# Patient Record
Sex: FEMALE | Race: WHITE | NOT HISPANIC OR LATINO
[De-identification: names, ages, dates, MRNs, and addresses within clinical notes are randomized per-mention and may not be internally consistent; named-entity substitution may affect disease eponyms.]

---

## 2017-04-05 ENCOUNTER — RESULT REVIEW (OUTPATIENT)
Age: 52
End: 2017-04-05

## 2017-06-30 ENCOUNTER — APPOINTMENT (OUTPATIENT)
Dept: MAMMOGRAPHY | Facility: CLINIC | Age: 52
End: 2017-06-30

## 2018-02-21 ENCOUNTER — EMERGENCY (EMERGENCY)
Facility: HOSPITAL | Age: 53
LOS: 0 days | Discharge: HOME | End: 2018-02-21
Attending: EMERGENCY MEDICINE | Admitting: EMERGENCY MEDICINE
Payer: COMMERCIAL

## 2018-02-21 VITALS
RESPIRATION RATE: 18 BRPM | TEMPERATURE: 99 F | DIASTOLIC BLOOD PRESSURE: 75 MMHG | HEART RATE: 83 BPM | SYSTOLIC BLOOD PRESSURE: 117 MMHG | OXYGEN SATURATION: 100 %

## 2018-02-21 VITALS — WEIGHT: 216.05 LBS | HEIGHT: 69 IN

## 2018-02-21 DIAGNOSIS — K57.92 DIVERTICULITIS OF INTESTINE, PART UNSPECIFIED, WITHOUT PERFORATION OR ABSCESS WITHOUT BLEEDING: ICD-10-CM

## 2018-02-21 DIAGNOSIS — R10.32 LEFT LOWER QUADRANT PAIN: ICD-10-CM

## 2018-02-21 LAB
ALBUMIN SERPL ELPH-MCNC: 3.5 G/DL — SIGNIFICANT CHANGE UP (ref 3.3–5)
ALP SERPL-CCNC: 58 U/L — SIGNIFICANT CHANGE UP (ref 40–120)
ALT FLD-CCNC: 21 U/L — SIGNIFICANT CHANGE UP (ref 12–78)
ANION GAP SERPL CALC-SCNC: 5 MMOL/L — SIGNIFICANT CHANGE UP (ref 5–17)
APPEARANCE UR: CLEAR — SIGNIFICANT CHANGE UP
APTT BLD: 30.2 SEC — SIGNIFICANT CHANGE UP (ref 27.5–37.4)
AST SERPL-CCNC: 9 U/L — LOW (ref 15–37)
BACTERIA # UR AUTO: (no result)
BASOPHILS # BLD AUTO: 0.1 K/UL — SIGNIFICANT CHANGE UP (ref 0–0.2)
BASOPHILS NFR BLD AUTO: 1.2 % — SIGNIFICANT CHANGE UP (ref 0–2)
BILIRUB SERPL-MCNC: 0.3 MG/DL — SIGNIFICANT CHANGE UP (ref 0.2–1.2)
BILIRUB UR-MCNC: NEGATIVE — SIGNIFICANT CHANGE UP
BUN SERPL-MCNC: 17 MG/DL — SIGNIFICANT CHANGE UP (ref 7–23)
CALCIUM SERPL-MCNC: 8.8 MG/DL — SIGNIFICANT CHANGE UP (ref 8.5–10.1)
CHLORIDE SERPL-SCNC: 106 MMOL/L — SIGNIFICANT CHANGE UP (ref 96–108)
CO2 SERPL-SCNC: 29 MMOL/L — SIGNIFICANT CHANGE UP (ref 22–31)
COLOR SPEC: YELLOW — SIGNIFICANT CHANGE UP
CREAT SERPL-MCNC: 1.03 MG/DL — SIGNIFICANT CHANGE UP (ref 0.5–1.3)
DIFF PNL FLD: (no result)
EOSINOPHIL # BLD AUTO: 0.3 K/UL — SIGNIFICANT CHANGE UP (ref 0–0.5)
EOSINOPHIL NFR BLD AUTO: 2.4 % — SIGNIFICANT CHANGE UP (ref 0–6)
EPI CELLS # UR: (no result)
GLUCOSE SERPL-MCNC: 88 MG/DL — SIGNIFICANT CHANGE UP (ref 70–99)
GLUCOSE UR QL: NEGATIVE MG/DL — SIGNIFICANT CHANGE UP
HCT VFR BLD CALC: 37.7 % — SIGNIFICANT CHANGE UP (ref 34.5–45)
HGB BLD-MCNC: 12.5 G/DL — SIGNIFICANT CHANGE UP (ref 11.5–15.5)
INR BLD: 1.03 RATIO — SIGNIFICANT CHANGE UP (ref 0.88–1.16)
KETONES UR-MCNC: (no result)
LEUKOCYTE ESTERASE UR-ACNC: (no result)
LYMPHOCYTES # BLD AUTO: 2.5 K/UL — SIGNIFICANT CHANGE UP (ref 1–3.3)
LYMPHOCYTES # BLD AUTO: 21.3 % — SIGNIFICANT CHANGE UP (ref 13–44)
MCHC RBC-ENTMCNC: 28.6 PG — SIGNIFICANT CHANGE UP (ref 27–34)
MCHC RBC-ENTMCNC: 33.2 GM/DL — SIGNIFICANT CHANGE UP (ref 32–36)
MCV RBC AUTO: 86.4 FL — SIGNIFICANT CHANGE UP (ref 80–100)
MONOCYTES # BLD AUTO: 1 K/UL — HIGH (ref 0–0.9)
MONOCYTES NFR BLD AUTO: 8.8 % — SIGNIFICANT CHANGE UP (ref 2–14)
NEUTROPHILS # BLD AUTO: 7.7 K/UL — HIGH (ref 1.8–7.4)
NEUTROPHILS NFR BLD AUTO: 66.3 % — SIGNIFICANT CHANGE UP (ref 43–77)
NITRITE UR-MCNC: NEGATIVE — SIGNIFICANT CHANGE UP
PH UR: 6 — SIGNIFICANT CHANGE UP (ref 5–8)
PLATELET # BLD AUTO: 428 K/UL — HIGH (ref 150–400)
POTASSIUM SERPL-MCNC: 4.2 MMOL/L — SIGNIFICANT CHANGE UP (ref 3.5–5.3)
POTASSIUM SERPL-SCNC: 4.2 MMOL/L — SIGNIFICANT CHANGE UP (ref 3.5–5.3)
PROT SERPL-MCNC: 7.6 GM/DL — SIGNIFICANT CHANGE UP (ref 6–8.3)
PROT UR-MCNC: NEGATIVE MG/DL — SIGNIFICANT CHANGE UP
PROTHROM AB SERPL-ACNC: 11.1 SEC — SIGNIFICANT CHANGE UP (ref 9.8–12.7)
RBC # BLD: 4.37 M/UL — SIGNIFICANT CHANGE UP (ref 3.8–5.2)
RBC # FLD: 12.1 % — SIGNIFICANT CHANGE UP (ref 10.3–14.5)
RBC CASTS # UR COMP ASSIST: (no result) /HPF (ref 0–4)
SODIUM SERPL-SCNC: 140 MMOL/L — SIGNIFICANT CHANGE UP (ref 135–145)
SP GR SPEC: 1.02 — SIGNIFICANT CHANGE UP (ref 1.01–1.02)
UROBILINOGEN FLD QL: 1 MG/DL
WBC # BLD: 11.6 K/UL — HIGH (ref 3.8–10.5)
WBC # FLD AUTO: 11.6 K/UL — HIGH (ref 3.8–10.5)
WBC UR QL: (no result)

## 2018-02-21 PROCEDURE — 74177 CT ABD & PELVIS W/CONTRAST: CPT | Mod: 26

## 2018-02-21 PROCEDURE — 99284 EMERGENCY DEPT VISIT MOD MDM: CPT

## 2018-02-21 RX ORDER — MOXIFLOXACIN HYDROCHLORIDE TABLETS, 400 MG 400 MG/1
1 TABLET, FILM COATED ORAL
Qty: 20 | Refills: 0
Start: 2018-02-21 | End: 2018-03-02

## 2018-02-21 RX ORDER — METRONIDAZOLE 500 MG
500 TABLET ORAL ONCE
Qty: 0 | Refills: 0 | Status: COMPLETED | OUTPATIENT
Start: 2018-02-21 | End: 2018-02-21

## 2018-02-21 RX ORDER — CIPROFLOXACIN LACTATE 400MG/40ML
500 VIAL (ML) INTRAVENOUS ONCE
Qty: 0 | Refills: 0 | Status: COMPLETED | OUTPATIENT
Start: 2018-02-21 | End: 2018-02-21

## 2018-02-21 RX ORDER — METRONIDAZOLE 500 MG
1 TABLET ORAL
Qty: 30 | Refills: 0
Start: 2018-02-21 | End: 2018-03-02

## 2018-02-21 RX ADMIN — Medication 500 MILLIGRAM(S): at 19:14

## 2018-02-21 NOTE — ED STATDOCS - OBJECTIVE STATEMENT
51 yo female PMH ovarian cysts and endometriosis presents with lower abdominal pain x 10 days, progressively worsening, worse in LLQ.  No f/c, black or bloody stools, diarrhea, n/v/d.  No vaginal bleeding or trauma. 53 yo female PMH ovarian cysts and endometriosis presents with lower abdominal pain x 10 days, progressively worsening, worse in LLQ.  No f/c, black or bloody stools, diarrhea, n/v/d.  No vaginal bleeding or trauma. No meds for symptoms. No urinary complaints. No trauma, back pain, fevers. Never had this before.

## 2018-02-21 NOTE — ED ADULT NURSE NOTE - OBJECTIVE STATEMENT
Patient comes to ED for bilateral lower abdominal pain for 10 days. pt was seen at primary care doctor today, - urine test. pt was sent for further evaluation of abdominal pain

## 2018-02-21 NOTE — ED STATDOCS - MEDICAL DECISION MAKING DETAILS
Pt with lower abdominal pain, worse in LLQ.  Plan for labs, UA, CT A/P. Pt with lower abdominal pain, worse in LLQ.  Plan for labs, UA, CT A/P. concern for new onset diverticulitis

## 2018-02-21 NOTE — ED STATDOCS - PROGRESS NOTE DETAILS
Patient seen and evaluated, ED attending note and orders reviewed, will continue with patient follow up and care -Janine Garcia PA-C Reviewed CT findings with patient.  Counseled on GI f/u, she sees Dr. Mix, and return precautions -Janine Garcia PA-C Reviewed CT findings with patient.  Counseled on GI f/u, she sees Dr. Mix, and return precautions -MAIN Swain given

## 2018-10-15 PROBLEM — N80.9 ENDOMETRIOSIS, UNSPECIFIED: Chronic | Status: ACTIVE | Noted: 2018-02-23

## 2018-10-15 PROBLEM — N83.209 UNSPECIFIED OVARIAN CYST, UNSPECIFIED SIDE: Chronic | Status: ACTIVE | Noted: 2018-02-23

## 2018-10-31 ENCOUNTER — APPOINTMENT (OUTPATIENT)
Dept: MAMMOGRAPHY | Facility: CLINIC | Age: 53
End: 2018-10-31
Payer: COMMERCIAL

## 2018-10-31 ENCOUNTER — APPOINTMENT (OUTPATIENT)
Dept: ULTRASOUND IMAGING | Facility: CLINIC | Age: 53
End: 2018-10-31
Payer: COMMERCIAL

## 2018-10-31 ENCOUNTER — OUTPATIENT (OUTPATIENT)
Dept: OUTPATIENT SERVICES | Facility: HOSPITAL | Age: 53
LOS: 1 days | End: 2018-10-31
Payer: COMMERCIAL

## 2018-10-31 DIAGNOSIS — Z00.8 ENCOUNTER FOR OTHER GENERAL EXAMINATION: ICD-10-CM

## 2018-10-31 PROCEDURE — G0279: CPT | Mod: 26

## 2018-10-31 PROCEDURE — 76641 ULTRASOUND BREAST COMPLETE: CPT | Mod: 26,LT

## 2018-10-31 PROCEDURE — 76641 ULTRASOUND BREAST COMPLETE: CPT

## 2018-10-31 PROCEDURE — 77065 DX MAMMO INCL CAD UNI: CPT | Mod: 26,LT

## 2018-10-31 PROCEDURE — G0279: CPT

## 2018-10-31 PROCEDURE — 77066 DX MAMMO INCL CAD BI: CPT

## 2018-10-31 PROCEDURE — 77065 DX MAMMO INCL CAD UNI: CPT

## 2019-06-07 ENCOUNTER — APPOINTMENT (OUTPATIENT)
Dept: OBGYN | Facility: CLINIC | Age: 54
End: 2019-06-07
Payer: COMMERCIAL

## 2019-06-07 VITALS
HEART RATE: 72 BPM | TEMPERATURE: 98 F | HEIGHT: 69 IN | SYSTOLIC BLOOD PRESSURE: 110 MMHG | BODY MASS INDEX: 31.4 KG/M2 | DIASTOLIC BLOOD PRESSURE: 78 MMHG | WEIGHT: 212 LBS | RESPIRATION RATE: 16 BRPM

## 2019-06-07 DIAGNOSIS — Z01.419 ENCOUNTER FOR GYNECOLOGICAL EXAMINATION (GENERAL) (ROUTINE) W/OUT ABNORMAL FINDINGS: ICD-10-CM

## 2019-06-07 LAB
BILIRUB UR QL STRIP: NORMAL
CLARITY UR: CLEAR
COLLECTION METHOD: NORMAL
GLUCOSE UR-MCNC: NORMAL
HCG UR QL: 0.2 EU/DL
HGB UR QL STRIP.AUTO: NORMAL
KETONES UR-MCNC: NORMAL
LEUKOCYTE ESTERASE UR QL STRIP: NORMAL
NITRITE UR QL STRIP: NORMAL
PH UR STRIP: 5
PROT UR STRIP-MCNC: NORMAL
SP GR UR STRIP: 1.02

## 2019-06-07 PROCEDURE — 99386 PREV VISIT NEW AGE 40-64: CPT

## 2019-06-07 PROCEDURE — 82270 OCCULT BLOOD FECES: CPT

## 2019-06-07 NOTE — PHYSICAL EXAM
[Alert] : alert [Awake] : awake [Mass] : no breast mass [Acute Distress] : no acute distress [Axillary LAD] : no axillary lymphadenopathy [Nipple Discharge] : no nipple discharge [Soft] : soft [Tender] : non tender [Oriented x3] : oriented to person, place, and time [Normal] : uterus [No Bleeding] : there was no active vaginal bleeding [Uterine Adnexae] : were not tender and not enlarged [Pap Obtained] : a Pap smear was performed [No Tenderness] : no rectal tenderness [Occult Blood] : occult blood test from digital rectal exam was negative [Nl Sphincter Tone] : normal sphincter tone

## 2019-06-07 NOTE — CHIEF COMPLAINT
[Initial Visit] : initial GYN visit [FreeTextEntry1] : Patient is a 53-year-old female presents for a routine annual gynecologic examination. Patient with a significant history of breast cancer for which the patient had a mastectomy 16 years ago and the patient states that she has a ruptured left breast implant. This issue was discussed with the patient in detail the patient's last mammogram was December 2018. Patient was advised to have a breast MRI and followup with a plastic surgeon to evaluate the ruptured breast implant referrals will be given. Patient also states she has a significant family history of cancer her mother had breast cancer and her maternal aunt had ovarian cancer. Patient was advised to have genetic cancer screening blood testing which she will have at this time. Patient also advised to have a bone density DEXA

## 2019-06-10 LAB — HPV HIGH+LOW RISK DNA PNL CVX: NOT DETECTED

## 2019-06-23 ENCOUNTER — FORM ENCOUNTER (OUTPATIENT)
Age: 54
End: 2019-06-23

## 2019-06-24 ENCOUNTER — APPOINTMENT (OUTPATIENT)
Dept: MRI IMAGING | Facility: CLINIC | Age: 54
End: 2019-06-24
Payer: COMMERCIAL

## 2019-06-24 ENCOUNTER — CLINICAL ADVICE (OUTPATIENT)
Age: 54
End: 2019-06-24

## 2019-06-24 ENCOUNTER — APPOINTMENT (OUTPATIENT)
Dept: RADIOLOGY | Facility: CLINIC | Age: 54
End: 2019-06-24
Payer: COMMERCIAL

## 2019-06-24 ENCOUNTER — OUTPATIENT (OUTPATIENT)
Dept: OUTPATIENT SERVICES | Facility: HOSPITAL | Age: 54
LOS: 1 days | End: 2019-06-24
Payer: COMMERCIAL

## 2019-06-24 DIAGNOSIS — Z00.8 ENCOUNTER FOR OTHER GENERAL EXAMINATION: ICD-10-CM

## 2019-06-24 PROCEDURE — 77080 DXA BONE DENSITY AXIAL: CPT | Mod: 26

## 2019-06-24 PROCEDURE — 77049 MRI BREAST C-+ W/CAD BI: CPT | Mod: 26

## 2019-06-24 PROCEDURE — C8908: CPT

## 2019-06-24 PROCEDURE — 77080 DXA BONE DENSITY AXIAL: CPT

## 2019-06-24 PROCEDURE — A9585: CPT

## 2019-06-24 PROCEDURE — C8937: CPT

## 2019-06-25 ENCOUNTER — CLINICAL ADVICE (OUTPATIENT)
Age: 54
End: 2019-06-25

## 2019-07-08 ENCOUNTER — FORM ENCOUNTER (OUTPATIENT)
Age: 54
End: 2019-07-08

## 2019-07-09 ENCOUNTER — APPOINTMENT (OUTPATIENT)
Dept: ULTRASOUND IMAGING | Facility: CLINIC | Age: 54
End: 2019-07-09
Payer: COMMERCIAL

## 2019-07-09 ENCOUNTER — RESULT REVIEW (OUTPATIENT)
Age: 54
End: 2019-07-09

## 2019-07-09 ENCOUNTER — OUTPATIENT (OUTPATIENT)
Dept: OUTPATIENT SERVICES | Facility: HOSPITAL | Age: 54
LOS: 1 days | End: 2019-07-09
Payer: COMMERCIAL

## 2019-07-09 DIAGNOSIS — Z00.8 ENCOUNTER FOR OTHER GENERAL EXAMINATION: ICD-10-CM

## 2019-07-09 DIAGNOSIS — Z85.3 PERSONAL HISTORY OF MALIGNANT NEOPLASM OF BREAST: ICD-10-CM

## 2019-07-09 PROCEDURE — 19084 BX BREAST ADD LESION US IMAG: CPT | Mod: LT

## 2019-07-09 PROCEDURE — 19083 BX BREAST 1ST LESION US IMAG: CPT | Mod: LT

## 2019-07-09 PROCEDURE — 88360 TUMOR IMMUNOHISTOCHEM/MANUAL: CPT | Mod: 26

## 2019-07-09 PROCEDURE — 77065 DX MAMMO INCL CAD UNI: CPT | Mod: 26,LT

## 2019-07-09 PROCEDURE — 19083 BX BREAST 1ST LESION US IMAG: CPT

## 2019-07-09 PROCEDURE — 88305 TISSUE EXAM BY PATHOLOGIST: CPT

## 2019-07-09 PROCEDURE — 88342 IMHCHEM/IMCYTCHM 1ST ANTB: CPT | Mod: 26,59

## 2019-07-09 PROCEDURE — 88360 TUMOR IMMUNOHISTOCHEM/MANUAL: CPT

## 2019-07-09 PROCEDURE — 88342 IMHCHEM/IMCYTCHM 1ST ANTB: CPT

## 2019-07-09 PROCEDURE — 88305 TISSUE EXAM BY PATHOLOGIST: CPT | Mod: 26

## 2019-07-09 PROCEDURE — A4648: CPT

## 2019-07-09 PROCEDURE — 19084 BX BREAST ADD LESION US IMAG: CPT

## 2019-07-09 PROCEDURE — 77065 DX MAMMO INCL CAD UNI: CPT

## 2019-07-11 ENCOUNTER — APPOINTMENT (OUTPATIENT)
Dept: GASTROENTEROLOGY | Facility: CLINIC | Age: 54
End: 2019-07-11
Payer: COMMERCIAL

## 2019-07-11 ENCOUNTER — CLINICAL ADVICE (OUTPATIENT)
Age: 54
End: 2019-07-11

## 2019-07-11 VITALS
HEIGHT: 69 IN | HEART RATE: 83 BPM | WEIGHT: 215 LBS | BODY MASS INDEX: 31.84 KG/M2 | DIASTOLIC BLOOD PRESSURE: 92 MMHG | SYSTOLIC BLOOD PRESSURE: 119 MMHG

## 2019-07-11 PROCEDURE — 99202 OFFICE O/P NEW SF 15 MIN: CPT

## 2019-07-11 NOTE — PHYSICAL EXAM
[General Appearance - Alert] : alert [General Appearance - In No Acute Distress] : in no acute distress [Auscultation Breath Sounds / Voice Sounds] : lungs were clear to auscultation bilaterally [Heart Rate And Rhythm] : heart rate was normal and rhythm regular [Heart Sounds Gallop] : no gallops [Heart Sounds] : normal S1 and S2 [Murmurs] : no murmurs [Heart Sounds Pericardial Friction Rub] : no pericardial rub [Bowel Sounds] : normal bowel sounds [Abdomen Soft] : soft [Abdomen Tenderness] : non-tender [] : no hepato-splenomegaly [Abdomen Mass (___ Cm)] : no abdominal mass palpated

## 2019-07-11 NOTE — HISTORY OF PRESENT ILLNESS
[de-identified] : 52 yo female with h problems of heartburn and GERD. No dysphagia, weight loss, nocturnal symptoms. Patient does note some globus symptoms associated and chronic cough.

## 2019-07-24 ENCOUNTER — APPOINTMENT (OUTPATIENT)
Dept: BREAST CENTER | Facility: CLINIC | Age: 54
End: 2019-07-24
Payer: COMMERCIAL

## 2019-07-24 VITALS
WEIGHT: 217 LBS | SYSTOLIC BLOOD PRESSURE: 129 MMHG | DIASTOLIC BLOOD PRESSURE: 72 MMHG | HEIGHT: 69 IN | HEART RATE: 100 BPM | BODY MASS INDEX: 32.14 KG/M2

## 2019-07-24 DIAGNOSIS — Z80.3 FAMILY HISTORY OF MALIGNANT NEOPLASM OF BREAST: ICD-10-CM

## 2019-07-24 DIAGNOSIS — Z80.41 FAMILY HISTORY OF MALIGNANT NEOPLASM OF OVARY: ICD-10-CM

## 2019-07-24 DIAGNOSIS — Z92.21 PERSONAL HISTORY OF ANTINEOPLASTIC CHEMOTHERAPY: ICD-10-CM

## 2019-07-24 PROCEDURE — 99205 OFFICE O/P NEW HI 60 MIN: CPT

## 2019-07-24 RX ORDER — AMOXICILLIN 500 MG/1
500 CAPSULE ORAL
Qty: 30 | Refills: 0 | Status: DISCONTINUED | COMMUNITY
Start: 2019-04-25

## 2019-07-24 NOTE — PAST MEDICAL HISTORY
[Menarche Age ____] : age at menarche was [unfilled] [Total Preg ___] : G[unfilled] [Live Births ___] : P[unfilled]  [Age At Live Birth ___] : Age at live birth: [unfilled] [FreeTextEntry2] : 3 daughters [FreeTextEntry7] : no

## 2019-07-24 NOTE — HISTORY OF PRESENT ILLNESS
[FreeTextEntry1] : This is a 53 year old  female who has a history of right breast cancer at age 37 treated with mastectomy/saline implant  reconstruction (Dr. Townsend/Reilly) and 6 months of chemotherapy (Dr. White- one drug was "red").  The right nipple was reconstructed with a left nipple graft. \par \par She complained of left breast itching in the lower breast since October 2018.  She had a mammogram and ultrasound that were negative.  She changed gynecologists to Dr. Faust in June and still complained of itching, so he sent her for an MRI. The MRI showed a number of abnormalities and a biopsy of 2 adjacent areas confirmed cancer. She has always had small pimple like lesions of her left nipple. She has a left breast saline implant that has been ruptured for at least 2 years.

## 2019-07-24 NOTE — DATA REVIEWED
[FreeTextEntry1] : Breast MRI 6/24/2019:  Right implant intact.  Left 5:00 N8 two adjacent masses, 1 and 1.5 cm. Abnormal clumped enhancement extending from masses medially.  Abnormal enhancement of NAC. Multiple additional masses throughout breast:  6:00 N4 8 mm, 3:00 N6 1 cm, 2:00 N6 7 mm, 12:00 N6 9 mm NME, 10:00 N4 8 mm.  Multiple small axillary nodes.\par \par Left breast ultrasound and US core 7/9/2019:  Left axilla with sonographically normal lymph nodes.  Left 4-5:00 N6 9x7x10 mm and irregular inferior mass [id number] mm.  Biopsies performed.\par \par Pathology 7/9/2019:  Left 4-5:00 superior= infiltrating ductal carcinoma SBR 6/9, ER 90% CT 70%Her 2 neg\par                                  Left 4-5:00 inferior= infiltrating ductal carcinoma SBR 6/9, ER 90%, CT 70%Her 2 neg

## 2019-07-24 NOTE — CONSULT LETTER
[Dear  ___] : Dear  [unfilled], [Consult Letter:] : I had the pleasure of evaluating your patient, [unfilled]. [Sincerely,] : Sincerely, [DrPepe  ___] : Dr. CANELA

## 2019-07-24 NOTE — PHYSICAL EXAM
[EOMI] : extra ocular movement intact [Supple] : supple [Sclera nonicteric] : sclera nonicteric [No Supraclavicular Adenopathy] : no supraclavicular adenopathy [No Cervical Adenopathy] : no cervical adenopathy [No Thyromegaly] : no thyromegaly [Clear to Auscultation Bilat] : clear to auscultation bilaterally [Normal Sinus Rhythm] : normal sinus rhythm [Normal S1, S2] : normal S1 and S2 [Bra Size: ___] : Bra Size: [unfilled] [Examined in the supine and seated position] : examined in the supine and seated position [No dominant masses] : no dominant masses in right breast  [No Axillary Lymphadenopathy] : no left axillary lymphadenopathy [Soft] : abdomen soft [Not Tender] : non-tender [de-identified] : Implant.  Transverse scar. Nipple/areolar reconstruction. [No Palpable Masses] : no abdominal mass palpated [de-identified] : Circumareolar scar.  Nipple with white plug. 1.5 cm vague mobile density lower outer quadrant with minimal ecchymoses. [de-identified] : Low transverse scar.

## 2019-07-24 NOTE — REVIEW OF SYSTEMS
[Negative] : Heme/Lymph [FreeTextEntry7] : Having EGD for GERD on Aug 2 [FreeTextEntry9] : Left abdominal discomfort/flank x few weeks, increased with movement

## 2019-07-26 ENCOUNTER — APPOINTMENT (OUTPATIENT)
Dept: PLASTIC SURGERY | Facility: CLINIC | Age: 54
End: 2019-07-26

## 2019-08-05 ENCOUNTER — APPOINTMENT (OUTPATIENT)
Dept: CT IMAGING | Facility: CLINIC | Age: 54
End: 2019-08-05
Payer: COMMERCIAL

## 2019-08-05 ENCOUNTER — OUTPATIENT (OUTPATIENT)
Dept: OUTPATIENT SERVICES | Facility: HOSPITAL | Age: 54
LOS: 1 days | End: 2019-08-05
Payer: COMMERCIAL

## 2019-08-05 DIAGNOSIS — Z00.8 ENCOUNTER FOR OTHER GENERAL EXAMINATION: ICD-10-CM

## 2019-08-05 PROCEDURE — 74174 CTA ABD&PLVS W/CONTRAST: CPT

## 2019-08-05 PROCEDURE — 74174 CTA ABD&PLVS W/CONTRAST: CPT | Mod: 26

## 2019-08-23 ENCOUNTER — APPOINTMENT (OUTPATIENT)
Dept: GASTROENTEROLOGY | Facility: AMBULATORY MEDICAL SERVICES | Age: 54
End: 2019-08-23

## 2019-09-03 ENCOUNTER — OUTPATIENT (OUTPATIENT)
Dept: OUTPATIENT SERVICES | Facility: HOSPITAL | Age: 54
LOS: 1 days | End: 2019-09-03
Payer: COMMERCIAL

## 2019-09-03 VITALS
DIASTOLIC BLOOD PRESSURE: 67 MMHG | WEIGHT: 218.04 LBS | TEMPERATURE: 98 F | HEART RATE: 68 BPM | SYSTOLIC BLOOD PRESSURE: 129 MMHG | HEIGHT: 69 IN | RESPIRATION RATE: 16 BRPM

## 2019-09-03 DIAGNOSIS — Z90.11 ACQUIRED ABSENCE OF RIGHT BREAST AND NIPPLE: Chronic | ICD-10-CM

## 2019-09-03 DIAGNOSIS — Z98.890 OTHER SPECIFIED POSTPROCEDURAL STATES: Chronic | ICD-10-CM

## 2019-09-03 DIAGNOSIS — Z98.82 BREAST IMPLANT STATUS: Chronic | ICD-10-CM

## 2019-09-03 DIAGNOSIS — C50.919 MALIGNANT NEOPLASM OF UNSPECIFIED SITE OF UNSPECIFIED FEMALE BREAST: ICD-10-CM

## 2019-09-03 DIAGNOSIS — Z01.818 ENCOUNTER FOR OTHER PREPROCEDURAL EXAMINATION: ICD-10-CM

## 2019-09-03 DIAGNOSIS — C50.512 MALIGNANT NEOPLASM OF LOWER-OUTER QUADRANT OF LEFT FEMALE BREAST: ICD-10-CM

## 2019-09-03 LAB
ALBUMIN SERPL ELPH-MCNC: 3.8 G/DL — SIGNIFICANT CHANGE UP (ref 3.3–5)
ALP SERPL-CCNC: 55 U/L — SIGNIFICANT CHANGE UP (ref 40–120)
ALT FLD-CCNC: 18 U/L — SIGNIFICANT CHANGE UP (ref 12–78)
ANION GAP SERPL CALC-SCNC: 11 MMOL/L — SIGNIFICANT CHANGE UP (ref 5–17)
APTT BLD: 31.2 SEC — SIGNIFICANT CHANGE UP (ref 27.5–36.3)
AST SERPL-CCNC: 10 U/L — LOW (ref 15–37)
BASOPHILS # BLD AUTO: 0.11 K/UL — SIGNIFICANT CHANGE UP (ref 0–0.2)
BASOPHILS NFR BLD AUTO: 1.1 % — SIGNIFICANT CHANGE UP (ref 0–2)
BILIRUB DIRECT SERPL-MCNC: <0.1 MG/DL — SIGNIFICANT CHANGE UP (ref 0–0.2)
BILIRUB INDIRECT FLD-MCNC: >0.4 MG/DL — SIGNIFICANT CHANGE UP (ref 0.2–1)
BILIRUB SERPL-MCNC: 0.5 MG/DL — SIGNIFICANT CHANGE UP (ref 0.2–1.2)
BILIRUB SERPL-MCNC: 0.5 MG/DL — SIGNIFICANT CHANGE UP (ref 0.2–1.2)
BUN SERPL-MCNC: 12 MG/DL — SIGNIFICANT CHANGE UP (ref 7–23)
CALCIUM SERPL-MCNC: 8.6 MG/DL — SIGNIFICANT CHANGE UP (ref 8.5–10.1)
CHLORIDE SERPL-SCNC: 104 MMOL/L — SIGNIFICANT CHANGE UP (ref 96–108)
CO2 SERPL-SCNC: 28 MMOL/L — SIGNIFICANT CHANGE UP (ref 22–31)
CREAT SERPL-MCNC: 0.88 MG/DL — SIGNIFICANT CHANGE UP (ref 0.5–1.3)
EOSINOPHIL # BLD AUTO: 0.19 K/UL — SIGNIFICANT CHANGE UP (ref 0–0.5)
EOSINOPHIL NFR BLD AUTO: 1.8 % — SIGNIFICANT CHANGE UP (ref 0–6)
GLUCOSE SERPL-MCNC: 91 MG/DL — SIGNIFICANT CHANGE UP (ref 70–99)
HCT VFR BLD CALC: 40.8 % — SIGNIFICANT CHANGE UP (ref 34.5–45)
HGB BLD-MCNC: 13.4 G/DL — SIGNIFICANT CHANGE UP (ref 11.5–15.5)
IMM GRANULOCYTES NFR BLD AUTO: 0.4 % — SIGNIFICANT CHANGE UP (ref 0–1.5)
INR BLD: 1.01 RATIO — SIGNIFICANT CHANGE UP (ref 0.88–1.16)
LYMPHOCYTES # BLD AUTO: 2.85 K/UL — SIGNIFICANT CHANGE UP (ref 1–3.3)
LYMPHOCYTES # BLD AUTO: 27.6 % — SIGNIFICANT CHANGE UP (ref 13–44)
MCHC RBC-ENTMCNC: 29.8 PG — SIGNIFICANT CHANGE UP (ref 27–34)
MCHC RBC-ENTMCNC: 32.8 GM/DL — SIGNIFICANT CHANGE UP (ref 32–36)
MCV RBC AUTO: 90.7 FL — SIGNIFICANT CHANGE UP (ref 80–100)
MONOCYTES # BLD AUTO: 0.69 K/UL — SIGNIFICANT CHANGE UP (ref 0–0.9)
MONOCYTES NFR BLD AUTO: 6.7 % — SIGNIFICANT CHANGE UP (ref 2–14)
NEUTROPHILS # BLD AUTO: 6.44 K/UL — SIGNIFICANT CHANGE UP (ref 1.8–7.4)
NEUTROPHILS NFR BLD AUTO: 62.4 % — SIGNIFICANT CHANGE UP (ref 43–77)
PLATELET # BLD AUTO: 364 K/UL — SIGNIFICANT CHANGE UP (ref 150–400)
POTASSIUM SERPL-MCNC: 4 MMOL/L — SIGNIFICANT CHANGE UP (ref 3.5–5.3)
POTASSIUM SERPL-SCNC: 4 MMOL/L — SIGNIFICANT CHANGE UP (ref 3.5–5.3)
PROT SERPL-MCNC: 7.7 GM/DL — SIGNIFICANT CHANGE UP (ref 6–8.3)
PROTHROM AB SERPL-ACNC: 11.2 SEC — SIGNIFICANT CHANGE UP (ref 10–12.9)
RBC # BLD: 4.5 M/UL — SIGNIFICANT CHANGE UP (ref 3.8–5.2)
RBC # FLD: 13.5 % — SIGNIFICANT CHANGE UP (ref 10.3–14.5)
SODIUM SERPL-SCNC: 143 MMOL/L — SIGNIFICANT CHANGE UP (ref 135–145)
WBC # BLD: 10.32 K/UL — SIGNIFICANT CHANGE UP (ref 3.8–10.5)
WBC # FLD AUTO: 10.32 K/UL — SIGNIFICANT CHANGE UP (ref 3.8–10.5)

## 2019-09-03 PROCEDURE — 80053 COMPREHEN METABOLIC PANEL: CPT

## 2019-09-03 PROCEDURE — 86900 BLOOD TYPING SEROLOGIC ABO: CPT

## 2019-09-03 PROCEDURE — 71046 X-RAY EXAM CHEST 2 VIEWS: CPT

## 2019-09-03 PROCEDURE — 36415 COLL VENOUS BLD VENIPUNCTURE: CPT

## 2019-09-03 PROCEDURE — 82247 BILIRUBIN TOTAL: CPT

## 2019-09-03 PROCEDURE — 71046 X-RAY EXAM CHEST 2 VIEWS: CPT | Mod: 26

## 2019-09-03 PROCEDURE — 86901 BLOOD TYPING SEROLOGIC RH(D): CPT

## 2019-09-03 PROCEDURE — 93010 ELECTROCARDIOGRAM REPORT: CPT

## 2019-09-03 PROCEDURE — 86850 RBC ANTIBODY SCREEN: CPT

## 2019-09-03 PROCEDURE — 93005 ELECTROCARDIOGRAM TRACING: CPT

## 2019-09-03 PROCEDURE — 85730 THROMBOPLASTIN TIME PARTIAL: CPT

## 2019-09-03 PROCEDURE — 82248 BILIRUBIN DIRECT: CPT

## 2019-09-03 PROCEDURE — 85610 PROTHROMBIN TIME: CPT

## 2019-09-03 PROCEDURE — G0463: CPT | Mod: 25

## 2019-09-03 PROCEDURE — 85025 COMPLETE CBC W/AUTO DIFF WBC: CPT

## 2019-09-03 NOTE — H&P PST ADULT - NSANTHOSAYNRD_GEN_A_CORE
No. ROWAN screening performed.  STOP BANG Legend: 0-2 = LOW Risk; 3-4 = INTERMEDIATE Risk; 5-8 = HIGH Risk

## 2019-09-03 NOTE — H&P PST ADULT - ASSESSMENT
55 y/o female presents to PST for scheduled left mastectomy, sentinal node biopsy and DMITRI on 9/12/19

## 2019-09-03 NOTE — H&P PST ADULT - NSICDXPROBLEM_GEN_ALL_CORE_FT
PROBLEM DIAGNOSES  Problem: Breast CA  Assessment and Plan: Pre op and 3 day of Hibiclens instructions reviewed and given.   Patient verbalized understanding, teach back method done   Take routine am meds DOS with sip of water.   Avoid NSAIDs and OTC supplements.  medical clearance requested by surgeon 9/6/19

## 2019-09-03 NOTE — H&P PST ADULT - HISTORY OF PRESENT ILLNESS
55 y/o female presents to PST for scheduled left mastectomy, sentinal node biopsy and DMITRI on 9/12/19. Patient reports itchiness to left breast and tingling sensation followed up with gyn, sent for mammogram with abnormal results 7/2019. FNB with confirmation breast ca.

## 2019-09-03 NOTE — H&P PST ADULT - NSICDXPASTSURGICALHX_GEN_ALL_CORE_FT
PAST SURGICAL HISTORY:  H/O removal of cyst ovarian cyst    H/O right breast implant s/p mastectomy    H/O total mastectomy of right breast 2003    S/P breast biopsy, left

## 2019-09-04 DIAGNOSIS — Z01.818 ENCOUNTER FOR OTHER PREPROCEDURAL EXAMINATION: ICD-10-CM

## 2019-09-04 DIAGNOSIS — C50.512 MALIGNANT NEOPLASM OF LOWER-OUTER QUADRANT OF LEFT FEMALE BREAST: ICD-10-CM

## 2019-09-12 ENCOUNTER — INPATIENT (INPATIENT)
Facility: HOSPITAL | Age: 54
LOS: 2 days | Discharge: HOME HEALTH | DRG: 580 | End: 2019-09-15
Attending: SURGERY | Admitting: SURGERY
Payer: COMMERCIAL

## 2019-09-12 ENCOUNTER — APPOINTMENT (OUTPATIENT)
Dept: BREAST CENTER | Facility: HOSPITAL | Age: 54
End: 2019-09-12
Payer: COMMERCIAL

## 2019-09-12 ENCOUNTER — RESULT REVIEW (OUTPATIENT)
Age: 54
End: 2019-09-12

## 2019-09-12 VITALS
TEMPERATURE: 98 F | HEIGHT: 69 IN | WEIGHT: 218.04 LBS | OXYGEN SATURATION: 96 % | DIASTOLIC BLOOD PRESSURE: 61 MMHG | SYSTOLIC BLOOD PRESSURE: 116 MMHG | HEART RATE: 83 BPM | RESPIRATION RATE: 16 BRPM

## 2019-09-12 DIAGNOSIS — C50.512 MALIGNANT NEOPLASM OF LOWER-OUTER QUADRANT OF LEFT FEMALE BREAST: ICD-10-CM

## 2019-09-12 DIAGNOSIS — Z98.890 OTHER SPECIFIED POSTPROCEDURAL STATES: Chronic | ICD-10-CM

## 2019-09-12 DIAGNOSIS — Z98.82 BREAST IMPLANT STATUS: Chronic | ICD-10-CM

## 2019-09-12 DIAGNOSIS — Z90.11 ACQUIRED ABSENCE OF RIGHT BREAST AND NIPPLE: Chronic | ICD-10-CM

## 2019-09-12 LAB
ANION GAP SERPL CALC-SCNC: 10 MMOL/L — SIGNIFICANT CHANGE UP (ref 5–17)
BUN SERPL-MCNC: 13 MG/DL — SIGNIFICANT CHANGE UP (ref 7–23)
CALCIUM SERPL-MCNC: 8.2 MG/DL — LOW (ref 8.5–10.1)
CHLORIDE SERPL-SCNC: 106 MMOL/L — SIGNIFICANT CHANGE UP (ref 96–108)
CO2 SERPL-SCNC: 25 MMOL/L — SIGNIFICANT CHANGE UP (ref 22–31)
CREAT SERPL-MCNC: 1.07 MG/DL — SIGNIFICANT CHANGE UP (ref 0.5–1.3)
GLUCOSE SERPL-MCNC: 201 MG/DL — HIGH (ref 70–99)
HCT VFR BLD CALC: 36.7 % — SIGNIFICANT CHANGE UP (ref 34.5–45)
HGB BLD-MCNC: 12.2 G/DL — SIGNIFICANT CHANGE UP (ref 11.5–15.5)
MCHC RBC-ENTMCNC: 30.3 PG — SIGNIFICANT CHANGE UP (ref 27–34)
MCHC RBC-ENTMCNC: 33.2 GM/DL — SIGNIFICANT CHANGE UP (ref 32–36)
MCV RBC AUTO: 91.3 FL — SIGNIFICANT CHANGE UP (ref 80–100)
PLATELET # BLD AUTO: 341 K/UL — SIGNIFICANT CHANGE UP (ref 150–400)
POTASSIUM SERPL-MCNC: 4.2 MMOL/L — SIGNIFICANT CHANGE UP (ref 3.5–5.3)
POTASSIUM SERPL-SCNC: 4.2 MMOL/L — SIGNIFICANT CHANGE UP (ref 3.5–5.3)
RBC # BLD: 4.02 M/UL — SIGNIFICANT CHANGE UP (ref 3.8–5.2)
RBC # FLD: 13.3 % — SIGNIFICANT CHANGE UP (ref 10.3–14.5)
SODIUM SERPL-SCNC: 141 MMOL/L — SIGNIFICANT CHANGE UP (ref 135–145)
WBC # BLD: 21.82 K/UL — HIGH (ref 3.8–10.5)
WBC # FLD AUTO: 21.82 K/UL — HIGH (ref 3.8–10.5)

## 2019-09-12 PROCEDURE — C1889: CPT

## 2019-09-12 PROCEDURE — 88305 TISSUE EXAM BY PATHOLOGIST: CPT | Mod: 26

## 2019-09-12 PROCEDURE — 38792 RA TRACER ID OF SENTINL NODE: CPT | Mod: LT

## 2019-09-12 PROCEDURE — 19303 MAST SIMPLE COMPLETE: CPT | Mod: LT

## 2019-09-12 PROCEDURE — 86901 BLOOD TYPING SEROLOGIC RH(D): CPT

## 2019-09-12 PROCEDURE — 88307 TISSUE EXAM BY PATHOLOGIST: CPT | Mod: 26

## 2019-09-12 PROCEDURE — 88305 TISSUE EXAM BY PATHOLOGIST: CPT

## 2019-09-12 PROCEDURE — C1781: CPT

## 2019-09-12 PROCEDURE — 80048 BASIC METABOLIC PNL TOTAL CA: CPT

## 2019-09-12 PROCEDURE — 36415 COLL VENOUS BLD VENIPUNCTURE: CPT

## 2019-09-12 PROCEDURE — 86900 BLOOD TYPING SEROLOGIC ABO: CPT

## 2019-09-12 PROCEDURE — 88342 IMHCHEM/IMCYTCHM 1ST ANTB: CPT

## 2019-09-12 PROCEDURE — 88342 IMHCHEM/IMCYTCHM 1ST ANTB: CPT | Mod: 26

## 2019-09-12 PROCEDURE — 88333 PATH CONSLTJ SURG CYTO XM 1: CPT | Mod: 26

## 2019-09-12 PROCEDURE — 88304 TISSUE EXAM BY PATHOLOGIST: CPT

## 2019-09-12 PROCEDURE — A9520: CPT

## 2019-09-12 PROCEDURE — 88333 PATH CONSLTJ SURG CYTO XM 1: CPT

## 2019-09-12 PROCEDURE — 19303 MAST SIMPLE COMPLETE: CPT | Mod: AS,LT

## 2019-09-12 PROCEDURE — 88307 TISSUE EXAM BY PATHOLOGIST: CPT

## 2019-09-12 PROCEDURE — 19328 RMVL INTACT BREAST IMPLANT: CPT | Mod: LT,59

## 2019-09-12 PROCEDURE — 38792 RA TRACER ID OF SENTINL NODE: CPT

## 2019-09-12 PROCEDURE — 38792 RA TRACER ID OF SENTINL NODE: CPT | Mod: 26

## 2019-09-12 PROCEDURE — 38525 BIOPSY/REMOVAL LYMPH NODES: CPT | Mod: LT

## 2019-09-12 PROCEDURE — 85027 COMPLETE CBC AUTOMATED: CPT

## 2019-09-12 PROCEDURE — 88300 SURGICAL PATH GROSS: CPT | Mod: 26,59

## 2019-09-12 PROCEDURE — 88300 SURGICAL PATH GROSS: CPT

## 2019-09-12 PROCEDURE — 86850 RBC ANTIBODY SCREEN: CPT

## 2019-09-12 PROCEDURE — 88304 TISSUE EXAM BY PATHOLOGIST: CPT | Mod: 26

## 2019-09-12 RX ORDER — ACETAMINOPHEN 500 MG
1000 TABLET ORAL ONCE
Refills: 0 | Status: COMPLETED | OUTPATIENT
Start: 2019-09-12 | End: 2019-09-12

## 2019-09-12 RX ORDER — ACETAMINOPHEN 500 MG
975 TABLET ORAL ONCE
Refills: 0 | Status: COMPLETED | OUTPATIENT
Start: 2019-09-12 | End: 2019-09-12

## 2019-09-12 RX ORDER — HYDROMORPHONE HYDROCHLORIDE 2 MG/ML
0.5 INJECTION INTRAMUSCULAR; INTRAVENOUS; SUBCUTANEOUS
Refills: 0 | Status: DISCONTINUED | OUTPATIENT
Start: 2019-09-12 | End: 2019-09-13

## 2019-09-12 RX ORDER — KETOROLAC TROMETHAMINE 30 MG/ML
15 SYRINGE (ML) INJECTION EVERY 6 HOURS
Refills: 0 | Status: DISCONTINUED | OUTPATIENT
Start: 2019-09-12 | End: 2019-09-13

## 2019-09-12 RX ORDER — ACETAMINOPHEN 500 MG
1000 TABLET ORAL ONCE
Refills: 0 | Status: COMPLETED | OUTPATIENT
Start: 2019-09-13 | End: 2019-09-13

## 2019-09-12 RX ORDER — INFLUENZA VIRUS VACCINE 15; 15; 15; 15 UG/.5ML; UG/.5ML; UG/.5ML; UG/.5ML
0.5 SUSPENSION INTRAMUSCULAR ONCE
Refills: 0 | Status: DISCONTINUED | OUTPATIENT
Start: 2019-09-12 | End: 2019-09-15

## 2019-09-12 RX ORDER — SODIUM CHLORIDE 9 MG/ML
1000 INJECTION, SOLUTION INTRAVENOUS
Refills: 0 | Status: DISCONTINUED | OUTPATIENT
Start: 2019-09-12 | End: 2019-09-13

## 2019-09-12 RX ORDER — IBUPROFEN 200 MG
400 TABLET ORAL EVERY 8 HOURS
Refills: 0 | Status: DISCONTINUED | OUTPATIENT
Start: 2019-09-13 | End: 2019-09-15

## 2019-09-12 RX ORDER — CEFAZOLIN SODIUM 1 G
2000 VIAL (EA) INJECTION EVERY 8 HOURS
Refills: 0 | Status: COMPLETED | OUTPATIENT
Start: 2019-09-12 | End: 2019-09-13

## 2019-09-12 RX ORDER — ONDANSETRON 8 MG/1
4 TABLET, FILM COATED ORAL ONCE
Refills: 0 | Status: COMPLETED | OUTPATIENT
Start: 2019-09-12 | End: 2019-09-12

## 2019-09-12 RX ORDER — FENTANYL CITRATE 50 UG/ML
50 INJECTION INTRAVENOUS
Refills: 0 | Status: DISCONTINUED | OUTPATIENT
Start: 2019-09-12 | End: 2019-09-13

## 2019-09-12 RX ORDER — HEPARIN SODIUM 5000 [USP'U]/ML
5000 INJECTION INTRAVENOUS; SUBCUTANEOUS EVERY 12 HOURS
Refills: 0 | Status: DISCONTINUED | OUTPATIENT
Start: 2019-09-12 | End: 2019-09-15

## 2019-09-12 RX ORDER — ONDANSETRON 8 MG/1
4 TABLET, FILM COATED ORAL EVERY 6 HOURS
Refills: 0 | Status: DISCONTINUED | OUTPATIENT
Start: 2019-09-13 | End: 2019-09-15

## 2019-09-12 RX ORDER — OXYCODONE HYDROCHLORIDE 5 MG/1
5 TABLET ORAL EVERY 4 HOURS
Refills: 0 | Status: DISCONTINUED | OUTPATIENT
Start: 2019-09-13 | End: 2019-09-15

## 2019-09-12 RX ORDER — HYDROMORPHONE HYDROCHLORIDE 2 MG/ML
0.5 INJECTION INTRAMUSCULAR; INTRAVENOUS; SUBCUTANEOUS
Refills: 0 | Status: DISCONTINUED | OUTPATIENT
Start: 2019-09-13 | End: 2019-09-15

## 2019-09-12 RX ORDER — PROCHLORPERAZINE MALEATE 5 MG
10 TABLET ORAL ONCE
Refills: 0 | Status: COMPLETED | OUTPATIENT
Start: 2019-09-12 | End: 2019-09-12

## 2019-09-12 RX ORDER — APREPITANT 80 MG/1
40 CAPSULE ORAL ONCE
Refills: 0 | Status: COMPLETED | OUTPATIENT
Start: 2019-09-12 | End: 2019-09-12

## 2019-09-12 RX ORDER — ACETAMINOPHEN 500 MG
975 TABLET ORAL EVERY 8 HOURS
Refills: 0 | Status: DISCONTINUED | OUTPATIENT
Start: 2019-09-13 | End: 2019-09-15

## 2019-09-12 RX ORDER — SODIUM CHLORIDE 9 MG/ML
3 INJECTION INTRAMUSCULAR; INTRAVENOUS; SUBCUTANEOUS EVERY 8 HOURS
Refills: 0 | Status: DISCONTINUED | OUTPATIENT
Start: 2019-09-12 | End: 2019-09-15

## 2019-09-12 RX ORDER — OXYCODONE HYDROCHLORIDE 5 MG/1
10 TABLET ORAL EVERY 4 HOURS
Refills: 0 | Status: DISCONTINUED | OUTPATIENT
Start: 2019-09-13 | End: 2019-09-15

## 2019-09-12 RX ADMIN — SODIUM CHLORIDE 3 MILLILITER(S): 9 INJECTION INTRAMUSCULAR; INTRAVENOUS; SUBCUTANEOUS at 21:59

## 2019-09-12 RX ADMIN — Medication 15 MILLIGRAM(S): at 19:47

## 2019-09-12 RX ADMIN — Medication 975 MILLIGRAM(S): at 07:24

## 2019-09-12 RX ADMIN — SODIUM CHLORIDE 125 MILLILITER(S): 9 INJECTION, SOLUTION INTRAVENOUS at 19:38

## 2019-09-12 RX ADMIN — HYDROMORPHONE HYDROCHLORIDE 0.5 MILLIGRAM(S): 2 INJECTION INTRAMUSCULAR; INTRAVENOUS; SUBCUTANEOUS at 17:43

## 2019-09-12 RX ADMIN — APREPITANT 40 MILLIGRAM(S): 80 CAPSULE ORAL at 07:23

## 2019-09-12 RX ADMIN — Medication 400 MILLIGRAM(S): at 21:53

## 2019-09-12 RX ADMIN — HEPARIN SODIUM 5000 UNIT(S): 5000 INJECTION INTRAVENOUS; SUBCUTANEOUS at 19:47

## 2019-09-12 RX ADMIN — ONDANSETRON 4 MILLIGRAM(S): 8 TABLET, FILM COATED ORAL at 18:08

## 2019-09-12 RX ADMIN — Medication 100 MILLIGRAM(S): at 23:19

## 2019-09-12 RX ADMIN — HYDROMORPHONE HYDROCHLORIDE 0.5 MILLIGRAM(S): 2 INJECTION INTRAMUSCULAR; INTRAVENOUS; SUBCUTANEOUS at 18:31

## 2019-09-12 RX ADMIN — SODIUM CHLORIDE 3 MILLILITER(S): 9 INJECTION INTRAMUSCULAR; INTRAVENOUS; SUBCUTANEOUS at 19:35

## 2019-09-12 RX ADMIN — Medication 10 MILLIGRAM(S): at 18:32

## 2019-09-12 RX ADMIN — Medication 1000 MILLIGRAM(S): at 21:58

## 2019-09-12 NOTE — BRIEF OPERATIVE NOTE - NSICDXBRIEFPROCEDURE_GEN_ALL_CORE_FT
PROCEDURES:  Excision of internal mammary lymph nodes 12-Sep-2019 17:47:36  Shahid Jordan  Repair of pectoralis muscles on both sides of chest 12-Sep-2019 17:47:11  Shahid Jordan  Reconstruction, breast, bilateral, with DMITRI flap 12-Sep-2019 17:41:38  Shahid Jordan  Breast implant removal 12-Sep-2019 14:22:16  Roselia Huang  Biopsy of left axillary sentinel nodes 12-Sep-2019 14:21:54  Roselia Huang  Mastectomy, left, total 12-Sep-2019 14:21:42  Roselia Huang
PROCEDURES:  Breast implant removal 12-Sep-2019 14:22:16  Roselia Huang  Biopsy of left axillary sentinel nodes 12-Sep-2019 14:21:54  Roselia Huang  Mastectomy, left, total 12-Sep-2019 14:21:42  Roselia Huang

## 2019-09-12 NOTE — BRIEF OPERATIVE NOTE - NSICDXBRIEFPREOP_GEN_ALL_CORE_FT
PRE-OP DIAGNOSIS:  History of right breast cancer 12-Sep-2019 14:23:10  Roselia Huang
PRE-OP DIAGNOSIS:  History of right breast cancer 12-Sep-2019 14:23:10  Roselia Huang  Cancer of overlapping sites of left female breast 12-Sep-2019 14:22:59  Roselia Huang

## 2019-09-12 NOTE — BRIEF OPERATIVE NOTE - OPERATION/FINDINGS
SLN x2 left axilla ans palpable node negative on touch prep  Left breast= 719g  rupture left breast implant

## 2019-09-12 NOTE — BRIEF OPERATIVE NOTE - NSICDXBRIEFPOSTOP_GEN_ALL_CORE_FT
POST-OP DIAGNOSIS:  History of right breast cancer 12-Sep-2019 14:24:13  Roselia Huang
POST-OP DIAGNOSIS:  History of right breast cancer 12-Sep-2019 14:24:13  Roselia Huang  Breast cancer, left breast 12-Sep-2019 14:23:59  Roselia Huang

## 2019-09-13 DIAGNOSIS — C50.512 MALIGNANT NEOPLASM OF LOWER-OUTER QUADRANT OF LEFT FEMALE BREAST: ICD-10-CM

## 2019-09-13 DIAGNOSIS — Z09 ENCOUNTER FOR FOLLOW-UP EXAMINATION AFTER COMPLETED TREATMENT FOR CONDITIONS OTHER THAN MALIGNANT NEOPLASM: ICD-10-CM

## 2019-09-13 LAB
ANION GAP SERPL CALC-SCNC: 8 MMOL/L — SIGNIFICANT CHANGE UP (ref 5–17)
BUN SERPL-MCNC: 10 MG/DL — SIGNIFICANT CHANGE UP (ref 7–23)
CALCIUM SERPL-MCNC: 8.1 MG/DL — LOW (ref 8.5–10.1)
CHLORIDE SERPL-SCNC: 106 MMOL/L — SIGNIFICANT CHANGE UP (ref 96–108)
CO2 SERPL-SCNC: 25 MMOL/L — SIGNIFICANT CHANGE UP (ref 22–31)
CREAT SERPL-MCNC: 0.78 MG/DL — SIGNIFICANT CHANGE UP (ref 0.5–1.3)
GLUCOSE SERPL-MCNC: 109 MG/DL — HIGH (ref 70–99)
HCT VFR BLD CALC: 33 % — LOW (ref 34.5–45)
HGB BLD-MCNC: 10.7 G/DL — LOW (ref 11.5–15.5)
MCHC RBC-ENTMCNC: 29.6 PG — SIGNIFICANT CHANGE UP (ref 27–34)
MCHC RBC-ENTMCNC: 32.4 GM/DL — SIGNIFICANT CHANGE UP (ref 32–36)
MCV RBC AUTO: 91.2 FL — SIGNIFICANT CHANGE UP (ref 80–100)
PLATELET # BLD AUTO: 343 K/UL — SIGNIFICANT CHANGE UP (ref 150–400)
POTASSIUM SERPL-MCNC: 4.1 MMOL/L — SIGNIFICANT CHANGE UP (ref 3.5–5.3)
POTASSIUM SERPL-SCNC: 4.1 MMOL/L — SIGNIFICANT CHANGE UP (ref 3.5–5.3)
RBC # BLD: 3.62 M/UL — LOW (ref 3.8–5.2)
RBC # FLD: 13.6 % — SIGNIFICANT CHANGE UP (ref 10.3–14.5)
SODIUM SERPL-SCNC: 139 MMOL/L — SIGNIFICANT CHANGE UP (ref 135–145)
WBC # BLD: 16.23 K/UL — HIGH (ref 3.8–10.5)
WBC # FLD AUTO: 16.23 K/UL — HIGH (ref 3.8–10.5)

## 2019-09-13 RX ADMIN — HEPARIN SODIUM 5000 UNIT(S): 5000 INJECTION INTRAVENOUS; SUBCUTANEOUS at 05:46

## 2019-09-13 RX ADMIN — HEPARIN SODIUM 5000 UNIT(S): 5000 INJECTION INTRAVENOUS; SUBCUTANEOUS at 17:47

## 2019-09-13 RX ADMIN — Medication 15 MILLIGRAM(S): at 02:30

## 2019-09-13 RX ADMIN — Medication 975 MILLIGRAM(S): at 16:10

## 2019-09-13 RX ADMIN — HYDROMORPHONE HYDROCHLORIDE 0.5 MILLIGRAM(S): 2 INJECTION INTRAMUSCULAR; INTRAVENOUS; SUBCUTANEOUS at 02:07

## 2019-09-13 RX ADMIN — Medication 400 MILLIGRAM(S): at 05:45

## 2019-09-13 RX ADMIN — Medication 1000 MILLIGRAM(S): at 05:45

## 2019-09-13 RX ADMIN — SODIUM CHLORIDE 3 MILLILITER(S): 9 INJECTION INTRAMUSCULAR; INTRAVENOUS; SUBCUTANEOUS at 15:22

## 2019-09-13 RX ADMIN — Medication 400 MILLIGRAM(S): at 09:20

## 2019-09-13 RX ADMIN — Medication 400 MILLIGRAM(S): at 18:45

## 2019-09-13 RX ADMIN — Medication 400 MILLIGRAM(S): at 17:47

## 2019-09-13 RX ADMIN — Medication 15 MILLIGRAM(S): at 02:07

## 2019-09-13 RX ADMIN — SODIUM CHLORIDE 3 MILLILITER(S): 9 INJECTION INTRAMUSCULAR; INTRAVENOUS; SUBCUTANEOUS at 21:48

## 2019-09-13 RX ADMIN — Medication 975 MILLIGRAM(S): at 22:18

## 2019-09-13 RX ADMIN — Medication 975 MILLIGRAM(S): at 15:19

## 2019-09-13 RX ADMIN — Medication 400 MILLIGRAM(S): at 08:28

## 2019-09-13 RX ADMIN — HYDROMORPHONE HYDROCHLORIDE 0.5 MILLIGRAM(S): 2 INJECTION INTRAMUSCULAR; INTRAVENOUS; SUBCUTANEOUS at 02:30

## 2019-09-14 ENCOUNTER — TRANSCRIPTION ENCOUNTER (OUTPATIENT)
Age: 54
End: 2019-09-14

## 2019-09-14 RX ORDER — IBUPROFEN 200 MG
1 TABLET ORAL
Qty: 0 | Refills: 0 | DISCHARGE
Start: 2019-09-14

## 2019-09-14 RX ADMIN — SODIUM CHLORIDE 3 MILLILITER(S): 9 INJECTION INTRAMUSCULAR; INTRAVENOUS; SUBCUTANEOUS at 14:03

## 2019-09-14 RX ADMIN — SODIUM CHLORIDE 3 MILLILITER(S): 9 INJECTION INTRAMUSCULAR; INTRAVENOUS; SUBCUTANEOUS at 05:03

## 2019-09-14 RX ADMIN — HEPARIN SODIUM 5000 UNIT(S): 5000 INJECTION INTRAVENOUS; SUBCUTANEOUS at 05:06

## 2019-09-14 RX ADMIN — SODIUM CHLORIDE 3 MILLILITER(S): 9 INJECTION INTRAMUSCULAR; INTRAVENOUS; SUBCUTANEOUS at 21:50

## 2019-09-14 RX ADMIN — Medication 975 MILLIGRAM(S): at 14:02

## 2019-09-14 RX ADMIN — Medication 975 MILLIGRAM(S): at 05:05

## 2019-09-14 RX ADMIN — Medication 400 MILLIGRAM(S): at 17:22

## 2019-09-14 RX ADMIN — Medication 400 MILLIGRAM(S): at 00:43

## 2019-09-14 RX ADMIN — Medication 400 MILLIGRAM(S): at 23:10

## 2019-09-14 RX ADMIN — Medication 400 MILLIGRAM(S): at 08:37

## 2019-09-14 RX ADMIN — Medication 975 MILLIGRAM(S): at 21:57

## 2019-09-14 RX ADMIN — HEPARIN SODIUM 5000 UNIT(S): 5000 INJECTION INTRAVENOUS; SUBCUTANEOUS at 17:22

## 2019-09-14 NOTE — DISCHARGE NOTE PROVIDER - NSDCCPCAREPLAN_GEN_ALL_CORE_FT
PRINCIPAL DISCHARGE DIAGNOSIS  Diagnosis: Cancer of left breast  Assessment and Plan of Treatment:       SECONDARY DISCHARGE DIAGNOSES  Diagnosis: History of right breast cancer  Assessment and Plan of Treatment:

## 2019-09-14 NOTE — DISCHARGE NOTE PROVIDER - NSDCFUSCHEDAPPT_GEN_ALL_CORE_FT
NUHA RODRIGUEZ ; 09/20/2019 ; [facility] Med Breast [address]  NUHA RODRIGUEZ ; 12/06/2019 ; NPP OB/ [address]

## 2019-09-14 NOTE — DISCHARGE NOTE PROVIDER - HOSPITAL COURSE
Left mastectomy with sentinel node biopsy, removal of bilateral implants, bilateral DMITRI reconstruction 9/12/2019

## 2019-09-14 NOTE — DISCHARGE NOTE PROVIDER - CARE PROVIDER_API CALL
Roselia Broussard)  Surgery  [address] [facility] [place] [address]  Phone: [phone]  Fax: [phone]  Follow Up Time:

## 2019-09-15 ENCOUNTER — TRANSCRIPTION ENCOUNTER (OUTPATIENT)
Age: 54
End: 2019-09-15

## 2019-09-15 VITALS
HEART RATE: 95 BPM | RESPIRATION RATE: 17 BRPM | OXYGEN SATURATION: 95 % | SYSTOLIC BLOOD PRESSURE: 125 MMHG | DIASTOLIC BLOOD PRESSURE: 66 MMHG | TEMPERATURE: 98 F

## 2019-09-15 RX ADMIN — HEPARIN SODIUM 5000 UNIT(S): 5000 INJECTION INTRAVENOUS; SUBCUTANEOUS at 05:52

## 2019-09-15 RX ADMIN — SODIUM CHLORIDE 3 MILLILITER(S): 9 INJECTION INTRAMUSCULAR; INTRAVENOUS; SUBCUTANEOUS at 05:12

## 2019-09-15 RX ADMIN — Medication 400 MILLIGRAM(S): at 09:04

## 2019-09-15 RX ADMIN — Medication 975 MILLIGRAM(S): at 05:51

## 2019-09-15 NOTE — PROGRESS NOTE ADULT - PROBLEM SELECTOR PLAN 1
Increase activity  Can be discharged later- patient may prefer to stay another night
path pending
path pending

## 2019-09-15 NOTE — PROGRESS NOTE ADULT - REASON FOR ADMISSION
Left breast cancer, left mastectomy, bilateral DMITRI
Left breast cancer
Left breast cancer/left mastectomy with bilateral reconstruction

## 2019-09-15 NOTE — PROGRESS NOTE ADULT - ASSESSMENT
S/p left mastectomy/bilateral DMITRI  Stable
Stable POD 1  Transfer to floor  Advance diet  OOB with assistance  Remove Clements  Decrease IVF  Continue ViOptix
Stable POD 2  Ambulate as tolerated  Continue flap checks  Anticipate discharge in am
Stable POD 3  Flaps viable  Ready for discharge  Instructions given  May shower at home  Follow-up Tuesday
Stable s/p left mastectomy, bilateral DMITRI
Stable for discharge

## 2019-09-15 NOTE — DISCHARGE NOTE NURSING/CASE MANAGEMENT/SOCIAL WORK - PATIENT PORTAL LINK FT
You can access the FollowMyHealth Patient Portal offered by [facility] by registering at the following website: [email or web address]. By joining [employer] [facility]’s FollowMyHealth portal, you will also be able to view your health information using other applications (apps) compatible with our system.

## 2019-09-15 NOTE — PROGRESS NOTE ADULT - PROBLEM SELECTOR PROBLEM 2
Malignant neoplasm of lower-outer quadrant of left breast of female, estrogen receptor positive
Postop check
Postop check

## 2019-09-15 NOTE — PROGRESS NOTE ADULT - PROBLEM SELECTOR PROBLEM 1
Malignant neoplasm of lower-outer quadrant of left breast of female, estrogen receptor positive
Postop check
Malignant neoplasm of lower-outer quadrant of left breast of female, estrogen receptor positive

## 2019-09-15 NOTE — PROGRESS NOTE ADULT - SUBJECTIVE AND OBJECTIVE BOX
NAD  Afebrile, VSS  ViOptix 74%, 70%  Bilateral breast free flap viable, closures intact.  Abdomen flat, closure intact.  Drains serosang.  Hct - 36.7
NAD  Afebrile, VSS  ViOptix removed earlier  Bilateral breast free flaps viable, closures intact.  Abdomen flat, closure intact.  Drains serosang.  Hct - 33
NAD. Feels ready to go home.  Afebrile, VSS  Bilateral breast free flap viable, closures intact.  Abdomen flat, closure intact.  Drains serosang.
S:  Sitting in chair.  Didn't sleep well. Minimal pain.    O:  Afebrile, VSS      Breasts soft, skin islands pink- sats 60-70%      Abdomen soft- incision clean      Drains more serous
S: Alert, comfortable eating breakfast    O: VSS      Breasts soft, wounds clean, sats 69,74%      Abd closure intact      Drains serosang      HCT 33
S:  ALert, comfortable    O:  Afebrile, VSS       Left mastectomy with some lateral bruising, soft, drain serosang       Bilateral skin islands pink       Abdominal closure clean.

## 2019-09-20 ENCOUNTER — APPOINTMENT (OUTPATIENT)
Dept: BREAST CENTER | Facility: CLINIC | Age: 54
End: 2019-09-20
Payer: COMMERCIAL

## 2019-09-20 VITALS
HEART RATE: 96 BPM | SYSTOLIC BLOOD PRESSURE: 108 MMHG | WEIGHT: 208 LBS | DIASTOLIC BLOOD PRESSURE: 66 MMHG | BODY MASS INDEX: 30.81 KG/M2 | HEIGHT: 69 IN

## 2019-09-20 PROBLEM — C50.919 MALIGNANT NEOPLASM OF UNSPECIFIED SITE OF UNSPECIFIED FEMALE BREAST: Chronic | Status: ACTIVE | Noted: 2019-09-03

## 2019-09-20 PROCEDURE — 99024 POSTOP FOLLOW-UP VISIT: CPT

## 2019-09-20 NOTE — HISTORY OF PRESENT ILLNESS
[FreeTextEntry1] : This is a 54 year old  female who has a history of right breast cancer at age 37 treated with mastectomy/saline implant  reconstruction (Dr. Townsend/Reilly) and 6 months of chemotherapy (Dr. White- one drug was "red").  The right nipple was reconstructed with a left nipple graft. \par \par She complained of left breast itching in the lower breast since October 2018.  She had a mammogram and ultrasound that were negative.  She changed gynecologists to Dr. Faust in June and still complained of itching, so he sent her for an MRI. The MRI showed a number of abnormalities and a biopsy of 2 adjacent areas confirmed cancer. She has always had small pimple like lesions of her left nipple. She had a left breast saline implant that has been ruptured for at least 2 years.\par \par She underwent a left mastectomy with sentinel node biopsy and removal of both implants with bilateral DMITRI flap reconstructions on 9/12/2019.  She had two areas of cancer up to 2.5 cm.  One sentinel node was positive on final pathology.  She is improving, but not sleeping well because she hasn't been able to sleep on her side.

## 2019-09-20 NOTE — CONSULT LETTER
[Dear  ___] : Dear  [unfilled], [Consult Letter:] : I had the pleasure of evaluating your patient, [unfilled]. [Sincerely,] : Sincerely, [DrPepe  ___] : Dr. CANELA [DrPepe ___] : Dr. CANELA

## 2019-09-20 NOTE — DATA REVIEWED
[FreeTextEntry1] : Breast MRI 6/24/2019:  Right implant intact.  Left 5:00 N8 two adjacent masses, 1 and 1.5 cm. Abnormal clumped enhancement extending from masses medially.  Abnormal enhancement of NAC. Multiple additional masses throughout breast:  6:00 N4 8 mm, 3:00 N6 1 cm, 2:00 N6 7 mm, 12:00 N6 9 mm NME, 10:00 N4 8 mm.  Multiple small axillary nodes.\par \par Left breast ultrasound and US core 7/9/2019:  Left axilla with sonographically normal lymph nodes.  Left 4-5:00 N6 9x7x10 mm and irregular inferior mass [id number] mm.  Biopsies performed.\par \par Pathology 7/9/2019:  Left 4-5:00 superior= infiltrating ductal carcinoma SBR 6/9, ER 90% MI 70%Her 2 neg\par                                  Left 4-5:00 inferior= infiltrating ductal carcinoma SBR 6/9, ER 90%, MI 70%Her 2 neg\par \par Surgical pathology  9/12/2019:  Left breast with two foci of invasive cancer 2.5 and 1.2 cm with DCIS UOQ, +LVI and perineural invasion, margins negative, SLN 1/2 positive 8mm, nonSLN neg.  Right internal mammary and left internal mammary nodes negative

## 2019-09-20 NOTE — PHYSICAL EXAM
[de-identified] : Circular skin island with lateral and medial extensions.  Drain #5 removed. [de-identified] : Circular skin island with lateral and medial extensions. [de-identified] : Transverse incision healing well.  Right hip drain removed.

## 2019-09-23 PROBLEM — Z13.79 GENETIC TESTING: Status: RESOLVED | Noted: 2019-07-24 | Resolved: 2019-09-23

## 2019-09-24 ENCOUNTER — APPOINTMENT (OUTPATIENT)
Age: 54
End: 2019-09-24
Payer: COMMERCIAL

## 2019-09-24 VITALS
BODY MASS INDEX: 31.31 KG/M2 | HEIGHT: 69 IN | RESPIRATION RATE: 16 BRPM | TEMPERATURE: 97.7 F | HEART RATE: 94 BPM | DIASTOLIC BLOOD PRESSURE: 78 MMHG | WEIGHT: 211.4 LBS | SYSTOLIC BLOOD PRESSURE: 122 MMHG

## 2019-09-24 DIAGNOSIS — Z78.9 OTHER SPECIFIED HEALTH STATUS: ICD-10-CM

## 2019-09-24 DIAGNOSIS — Z13.79 ENCOUNTER FOR OTHER SCREENING FOR GENETIC AND CHROMOSOMAL ANOMALIES: ICD-10-CM

## 2019-09-24 PROCEDURE — 99204 OFFICE O/P NEW MOD 45 MIN: CPT

## 2019-09-24 RX ORDER — ELECTROLYTES/DEXTROSE
SOLUTION, ORAL ORAL
Refills: 0 | Status: DISCONTINUED | COMMUNITY
End: 2019-09-24

## 2019-09-24 RX ORDER — ACETAMINOPHEN 325 MG/1
TABLET, FILM COATED ORAL
Refills: 0 | Status: DISCONTINUED | COMMUNITY
End: 2019-09-24

## 2019-09-24 NOTE — OB HISTORY
[Currently In Menopause] : currently in menopause [Menopause Age: ____] : age at menopause was [unfilled] [___] : Full Term: [unfilled]

## 2019-09-25 DIAGNOSIS — Z85.3 PERSONAL HISTORY OF MALIGNANT NEOPLASM OF BREAST: ICD-10-CM

## 2019-09-25 DIAGNOSIS — S41.102A UNSPECIFIED OPEN WOUND OF LEFT UPPER ARM, INITIAL ENCOUNTER: ICD-10-CM

## 2019-09-25 DIAGNOSIS — Z90.13 ACQUIRED ABSENCE OF BILATERAL BREASTS AND NIPPLES: ICD-10-CM

## 2019-09-25 NOTE — ASSESSMENT
[FreeTextEntry1] : Ms. RODRIGUEZ 's questions were answered to her satisfaction. She expressed her understanding and willingness to comply with the above recommendations, and  will return to the office in mid October 2019.\par

## 2019-09-25 NOTE — REVIEW OF SYSTEMS
[Negative] : Allergic/Immunologic [Skin Wound] : skin wound [de-identified] : transabdominal incision s/p DMITRI healing;  b/l DMITRI reconstructive surgery

## 2019-09-25 NOTE — PHYSICAL EXAM
[Fully active, able to carry on all pre-disease performance without restriction] : Status 0 - Fully active, able to carry on all pre-disease performance without restriction [Normal] : affect appropriate [de-identified] : bilateral mastectomy ; bilateral DMITRI reconstruction, scars well healed. [de-identified] : transverse abdominal incision healing; right sided wound opening with minimal serosanguineous secretions; left sided drain catheter in place, draining small amount of serosanguineous fluid

## 2019-09-25 NOTE — RESULTS/DATA
[FreeTextEntry1] : I reviewed recent blood work results with patient.\par \par \par 9/13/19: WBC 16.23, hemoglobin 10.7 g/dL, hematocrit 33%, platelet 343,000

## 2019-09-25 NOTE — CONSULT LETTER
[Dear  ___] : Dear  [unfilled], [Consult Letter:] : I had the pleasure of evaluating your patient, [unfilled]. [Please see my note below.] : Please see my note below. [Consult Closing:] : Thank you very much for allowing me to participate in the care of this patient.  If you have any questions, please do not hesitate to contact me. [Sincerely,] : Sincerely, [DrPepe  ___] : Dr. CANELA [___] : [unfilled] [FreeTextEntry2] : Roselia Huang M.D.\par \par \par  [FreeTextEntry3] : DON BRYAN M.D.\par Medical Oncology\par [facility]\par [facility]\par [address] [place] [address], [facility]\par [place] [address]\par Telephone: [phone]\par FAX: [phone]\par \par

## 2019-09-30 DIAGNOSIS — T85.49XA OTHER MECHANICAL COMPLICATION OF BREAST PROSTHESIS AND IMPLANT, INITIAL ENCOUNTER: ICD-10-CM

## 2019-09-30 DIAGNOSIS — C50.912 MALIGNANT NEOPLASM OF UNSPECIFIED SITE OF LEFT FEMALE BREAST: ICD-10-CM

## 2019-09-30 DIAGNOSIS — K21.9 GASTRO-ESOPHAGEAL REFLUX DISEASE WITHOUT ESOPHAGITIS: ICD-10-CM
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
## 2019-09-30 DIAGNOSIS — C77.3 SECONDARY AND UNSPECIFIED MALIGNANT NEOPLASM OF AXILLA AND UPPER LIMB LYMPH NODES: ICD-10-CM

## 2019-10-01 ENCOUNTER — CLINICAL ADVICE (OUTPATIENT)
Age: 54
End: 2019-10-01

## 2019-10-01 ENCOUNTER — FORM ENCOUNTER (OUTPATIENT)
Age: 54
End: 2019-10-01

## 2019-10-02 ENCOUNTER — APPOINTMENT (OUTPATIENT)
Dept: NUCLEAR MEDICINE | Facility: CLINIC | Age: 54
End: 2019-10-02
Payer: COMMERCIAL

## 2019-10-02 ENCOUNTER — APPOINTMENT (OUTPATIENT)
Dept: BREAST CENTER | Facility: CLINIC | Age: 54
End: 2019-10-02
Payer: COMMERCIAL

## 2019-10-02 ENCOUNTER — OUTPATIENT (OUTPATIENT)
Dept: OUTPATIENT SERVICES | Facility: HOSPITAL | Age: 54
LOS: 1 days | End: 2019-10-02
Payer: COMMERCIAL

## 2019-10-02 ENCOUNTER — APPOINTMENT (OUTPATIENT)
Dept: ULTRASOUND IMAGING | Facility: CLINIC | Age: 54
End: 2019-10-02
Payer: COMMERCIAL

## 2019-10-02 VITALS
WEIGHT: 210 LBS | HEART RATE: 92 BPM | BODY MASS INDEX: 31.1 KG/M2 | HEIGHT: 69 IN | SYSTOLIC BLOOD PRESSURE: 106 MMHG | DIASTOLIC BLOOD PRESSURE: 69 MMHG

## 2019-10-02 DIAGNOSIS — Z98.82 BREAST IMPLANT STATUS: Chronic | ICD-10-CM

## 2019-10-02 DIAGNOSIS — Z00.8 ENCOUNTER FOR OTHER GENERAL EXAMINATION: ICD-10-CM

## 2019-10-02 DIAGNOSIS — Z98.890 OTHER SPECIFIED POSTPROCEDURAL STATES: Chronic | ICD-10-CM

## 2019-10-02 DIAGNOSIS — Z90.11 ACQUIRED ABSENCE OF RIGHT BREAST AND NIPPLE: Chronic | ICD-10-CM

## 2019-10-02 DIAGNOSIS — Z82.49 FAMILY HISTORY OF ISCHEMIC HEART DISEASE AND OTHER DISEASES OF THE CIRCULATORY SYSTEM: ICD-10-CM

## 2019-10-02 PROCEDURE — 78815 PET IMAGE W/CT SKULL-THIGH: CPT | Mod: 26,PI

## 2019-10-02 PROCEDURE — 93971 EXTREMITY STUDY: CPT | Mod: 26,RT

## 2019-10-02 PROCEDURE — A9552: CPT

## 2019-10-02 PROCEDURE — 99024 POSTOP FOLLOW-UP VISIT: CPT

## 2019-10-02 PROCEDURE — 93971 EXTREMITY STUDY: CPT

## 2019-10-02 PROCEDURE — 78815 PET IMAGE W/CT SKULL-THIGH: CPT

## 2019-10-02 NOTE — ADDENDUM
[FreeTextEntry1] : I spoke to Dr. Carpio covering Dr. Lindo.  No additional coagulation workup indicated at this time.  Anticoagulation would be advised if there is deep vein involvement.

## 2019-10-02 NOTE — DATA REVIEWED
[FreeTextEntry1] : Breast MRI 6/24/2019:  Right implant intact.  Left 5:00 N8 two adjacent masses, 1 and 1.5 cm. Abnormal clumped enhancement extending from masses medially.  Abnormal enhancement of NAC. Multiple additional masses throughout breast:  6:00 N4 8 mm, 3:00 N6 1 cm, 2:00 N6 7 mm, 12:00 N6 9 mm NME, 10:00 N4 8 mm.  Multiple small axillary nodes.\par \par Left breast ultrasound and US core 7/9/2019:  Left axilla with sonographically normal lymph nodes.  Left 4-5:00 N6 9x7x10 mm and irregular inferior mass [id number] mm.  Biopsies performed.\par \par Pathology 7/9/2019:  Left 4-5:00 superior= infiltrating ductal carcinoma SBR 6/9, ER 90% SD 70%Her 2 neg\par                                  Left 4-5:00 inferior= infiltrating ductal carcinoma SBR 6/9, ER 90%, SD 70%Her 2 neg\par \par Surgical pathology  9/12/2019:  Left breast with two foci of invasive cancer 2.5 and 1.2 cm with DCIS UOQ, +LVI and perineural invasion, margins negative, SLN 1/2 positive 8mm, nonSLN neg.  Right internal mammary and left internal mammary nodes negative

## 2019-10-02 NOTE — HISTORY OF PRESENT ILLNESS
[FreeTextEntry1] : This is a 54 year old  female who has a history of right breast cancer at age 37 treated with mastectomy/saline implant  reconstruction (Dr. Townsend/Reilly) and 6 months of chemotherapy (Dr. White- one drug was "red").  The right nipple was reconstructed with a left nipple graft. \par \par She complained of left breast itching in the lower breast since October 2018.  She had a mammogram and ultrasound that were negative.  She changed gynecologists to Dr. Faust in June and still complained of itching, so he sent her for an MRI. The MRI showed a number of abnormalities and a biopsy of 2 adjacent areas confirmed cancer. She has always had small pimple like lesions of her left nipple. She had a left breast saline implant that has been ruptured for at least 2 years.\par \par She underwent a left mastectomy with sentinel node biopsy and removal of both implants with bilateral DMITRI flap reconstructions on 9/12/2019.  She had two areas of cancer up to 2.5 cm.  One sentinel node was positive on final pathology.  \par \par She is scheduled for a left axillary dissection with LYMPHA procedure in 2 days.  She complains of a tender cord in her right forearm that she noticed about 5 days ago.  She was seen at Dr. Rodriguez's office 2 days ago and told to use Motrin and warm compresses.  She didn't wan to take Motrin because of the upcoming surgery. She also noticed swelling of the right abdomen.  The left drain is draining about 25cc/24 hours.\par \par She saw Dr. Lindo a few days ago and a PET/CT was done this morning.

## 2019-10-02 NOTE — PHYSICAL EXAM
[de-identified] : Circular skin islands.  Wounds clean. [de-identified] : Circular skin islands.  Wounds clean. [de-identified] : Transverse scar clean.  No erythema.  Fullness right lateral confirmed to be fluid collection on ultrasound. [de-identified] : Tender superficial vein right dorsal forearm extending to elbow. No erythema.  No arm swelling.

## 2019-10-02 NOTE — CONSULT LETTER
[Dear  ___] : Dear  [unfilled], [Consult Letter:] : I had the pleasure of evaluating your patient, [unfilled]. [DrPepe  ___] : Dr. CANELA [Sincerely,] : Sincerely, [DrPepe ___] : Dr. CANELA

## 2019-10-04 ENCOUNTER — APPOINTMENT (OUTPATIENT)
Dept: BREAST CENTER | Facility: HOSPITAL | Age: 54
End: 2019-10-04

## 2019-10-07 ENCOUNTER — OTHER (OUTPATIENT)
Age: 54
End: 2019-10-07

## 2019-10-07 DIAGNOSIS — Z87.42 PERSONAL HISTORY OF OTHER DISEASES OF THE FEMALE GENITAL TRACT: ICD-10-CM

## 2019-10-07 DIAGNOSIS — Z80.3 FAMILY HISTORY OF MALIGNANT NEOPLASM OF BREAST: ICD-10-CM

## 2019-10-07 DIAGNOSIS — Z01.419 ENCOUNTER FOR GYNECOLOGICAL EXAMINATION (GENERAL) (ROUTINE) W/OUT ABNORMAL FINDINGS: ICD-10-CM

## 2019-10-07 DIAGNOSIS — T85.43XA LEAKAGE OF BREAST PROSTHESIS AND IMPLANT, INITIAL ENCOUNTER: ICD-10-CM

## 2019-10-11 ENCOUNTER — APPOINTMENT (OUTPATIENT)
Dept: BREAST CENTER | Facility: CLINIC | Age: 54
End: 2019-10-11

## 2019-10-17 NOTE — CONSULT LETTER
[Dear  ___] : Dear  [unfilled], [Sincerely,] : Sincerely, [Consult Letter:] : I had the pleasure of evaluating your patient, [unfilled]. [DrPepe  ___] : Dr. CANELA [DrPepe ___] : Dr. CANELA

## 2019-10-17 NOTE — PAST MEDICAL HISTORY
[Menarche Age ____] : age at menarche was [unfilled] [Total Preg ___] : G[unfilled] [Age At Live Birth ___] : Age at live birth: [unfilled] [Live Births ___] : P[unfilled]

## 2019-10-18 ENCOUNTER — APPOINTMENT (OUTPATIENT)
Dept: BREAST CENTER | Facility: CLINIC | Age: 54
End: 2019-10-18
Payer: COMMERCIAL

## 2019-10-18 VITALS
BODY MASS INDEX: 30.81 KG/M2 | WEIGHT: 208 LBS | SYSTOLIC BLOOD PRESSURE: 110 MMHG | HEART RATE: 104 BPM | HEIGHT: 69 IN | DIASTOLIC BLOOD PRESSURE: 72 MMHG

## 2019-10-18 PROCEDURE — 99024 POSTOP FOLLOW-UP VISIT: CPT

## 2019-10-18 NOTE — PHYSICAL EXAM
[de-identified] : Circular skin islands.  Wounds clean. [de-identified] : Circular skin islands.  Wounds clean. [de-identified] : Transverse scar clean.  No erythema.   [de-identified] : Phlebitis right arm nearly resolved.  Small area palpable just proximal to elbow.

## 2019-10-18 NOTE — HISTORY OF PRESENT ILLNESS
[FreeTextEntry1] : This is a 54 year old  female who has a history of right breast cancer at age 37 treated with mastectomy/saline implant  reconstruction (Dr. Townsend/Reilly) and 6 months of chemotherapy (Dr. White- one drug was "red").  The right nipple was reconstructed with a left nipple graft. \par \par She complained of left breast itching in the lower breast since October 2018.  She had a mammogram and ultrasound that were negative.  She changed gynecologists to Dr. Faust in June and still complained of itching, so he sent her for an MRI. The MRI showed a number of abnormalities and a biopsy of 2 adjacent areas confirmed cancer. She has always had small pimple like lesions of her left nipple. She had a left breast saline implant that has been ruptured for at least 2 years.\par \par She underwent a left mastectomy with sentinel node biopsy and removal of both implants with bilateral DMITRI flap reconstructions on 9/12/2019.  She had two areas of cancer up to 2.5 cm.  One sentinel node was positive on final pathology.  \par \par She was scheduled for a left axillary dissection with LYMPHA procedure.  She was seen on Oct 2 complaining of a tender cord in her right forearm that she noticed about 5 days prior.  She had an extensive superficial phlebitis of her basilic vein.  She was started on Xarelato and the surgery was canceled.\par \par She is going to start chemotherapy, but will have a port inserted first.  She doesn't feel the vein in her arm anymore.\par \par

## 2019-10-18 NOTE — DATA REVIEWED
[FreeTextEntry1] : Breast MRI 6/24/2019:  Right implant intact.  Left 5:00 N8 two adjacent masses, 1 and 1.5 cm. Abnormal clumped enhancement extending from masses medially.  Abnormal enhancement of NAC. Multiple additional masses throughout breast:  6:00 N4 8 mm, 3:00 N6 1 cm, 2:00 N6 7 mm, 12:00 N6 9 mm NME, 10:00 N4 8 mm.  Multiple small axillary nodes.\par \par Left breast ultrasound and US core 7/9/2019:  Left axilla with sonographically normal lymph nodes.  Left 4-5:00 N6 9x7x10 mm and irregular inferior mass [id number] mm.  Biopsies performed.\par \par Pathology 7/9/2019:  Left 4-5:00 superior= infiltrating ductal carcinoma SBR 6/9, ER 90% AZ 70%Her 2 neg\par                                  Left 4-5:00 inferior= infiltrating ductal carcinoma SBR 6/9, ER 90%, AZ 70%Her 2 neg\par \par Surgical pathology  9/12/2019:  Left breast with two foci of invasive cancer 2.5 and 1.2 cm with DCIS UOQ, +LVI and perineural invasion, margins negative, SLN 1/2 positive 8mm, nonSLN neg.  Right internal mammary and left internal mammary nodes negative

## 2019-10-21 ENCOUNTER — APPOINTMENT (OUTPATIENT)
Age: 54
End: 2019-10-21
Payer: COMMERCIAL

## 2019-10-21 VITALS
BODY MASS INDEX: 31.1 KG/M2 | TEMPERATURE: 98.2 F | DIASTOLIC BLOOD PRESSURE: 66 MMHG | HEART RATE: 101 BPM | HEIGHT: 69 IN | WEIGHT: 210 LBS | SYSTOLIC BLOOD PRESSURE: 133 MMHG | RESPIRATION RATE: 16 BRPM

## 2019-10-21 PROCEDURE — 99214 OFFICE O/P EST MOD 30 MIN: CPT

## 2019-10-21 RX ORDER — SCOPOLAMINE 1.5 MG/1
1 PATCH, EXTENDED RELEASE TRANSDERMAL
Qty: 1 | Refills: 0 | Status: DISCONTINUED | COMMUNITY
Start: 2019-08-21 | End: 2019-10-21

## 2019-10-21 NOTE — REASON FOR VISIT
[Follow-Up: _____] : a [unfilled] follow-up visit [Follow-Up Visit] : a follow-up [FreeTextEntry2] : breast cancer

## 2019-10-21 NOTE — HISTORY OF PRESENT ILLNESS
[Disease: _____________________] : Disease: [unfilled] [T: ___] : T[unfilled] [N: ___] : N[unfilled] [AJCC Stage: ____] : AJCC Stage: [unfilled] [de-identified] : 54F with history right breast cancer at age 37, s/p right mastectomy with saline implant reconstruction, and nipple reconstruction with left nipple graft, s/p adjuvant chemotherapy x 6 months (Dr. Rene White), referred for medical oncologic consultation after diagnosis of left breast carcinoma in September 2019. \par \par CASE SYNOPSIS: \par 5/29/03- segmental resection right breast for moderately differentiated infiltrating ductal carcinoma; margin involved by infiltrating tumor.\par \par 7/10/03- right total mastectomy with saline implant reconstruction( Meghna Townsend/ Benny Grover). Received adjuvant systemic chemotherapy ( records to be obtained), under the care of Dr. Don White.\par \par 10/2018- reports lower left breast itching.\par \par 10/31/18- mammogram/ breast US: no suspicious lesions, no evidence of malignancy, intramammary lymph node 2:00 left breast.\par \par 6/24/19 MRI breast – right implant intact; left breast 5:00 N8, 2 adjacent masses 1, and 1.5 cm; abnormal clumped enhancement extending from masses medially, multiple additional masses throughout left breast: 6:00 N4 8 mm, 3:00 N6 1 cm, 2:00 N6 7 mm, 12:00 N6 9 mm, 10:00 N4 8 mm. Multiple small axillary lymph nodes.\par \par 6/25/19- DEXA scan: low bone mass based on left femoral neck T score (-1.2 SD from the mean).\par \par 7/9/19: left breast US and US-guided core biopsy; left axilla with sonographically normal lymph node. \par Left 4-5:00 N6 9 x 7 x 10 mm and irregular inferior mass 18 x 9 x 14 mm. Pathology: \par Left 4-5:00 superior- infiltrating ductal carcinoma, SBR 6/9, ER90%, [place] 90%, Her 2 negative. \par Left 4-5:00 inferior - infiltrating ductal carcinoma, SBR 6/9, ER90%, [place] 70%, Her 2 negative.\par \par 9/12/19: left mastectomy with sentinel node biopsy and removal of both implants with bilateral DMITRI flap reconstruction. \par Pathology: left breast with 2 foci of invasive ductal carcinoma 2.5 cm and 1.2 cm , SBR 6/9, with foci of DCIS in UOQ, + LVI and PNI, margins negative, 1 / 2 SLN positive ( 8 mm ; no extracapsular extension), 2 more negative non- sentinel LN,  right and left internal mammary nodes negative.(pT2, pN1, Mx = stage IIB)\par \par 10/1/19- develops venous thrombosis in the basilic vein, superficial, extended for at least 10 cm.  LYMPHA procedure postponed. Started on anticoagulation.\par \par 10/2/19: PET/CT consistent with FDG avidity associated with the reconstructed bilateral breast in the anterior abdominal wall; no FDG avid local regional or distant metastatic disease. [de-identified] : infiltrating ductal carcinoma [de-identified] : ER 90%, [place] 90%, Her 2 negative [FreeTextEntry1] : axillary dissection scheduled for October 4, 2019 [de-identified] : Returning for followup, last seen September 24, 2019. Since last seen, patient was started on anticoagulation due to the right upper extremity DVT. Her LYMPHA procedure has been thus postponed. In interim patient had PET/CT performed October 2, 2019 showed no evidence of metastatic disease; Oncotype DX was 24 (intermediate). She followed up with plastic surgery and had wound catheters removed. She is soon due to have [id number] placement and start systemic chemotherapy with Taxotere/cyclophosphamide. Her breast and abdominal incisions are healing well; plastic surgeon to see patient tomorrow and have more of the abdominal seroma ( related to the DMITRI) incision drained.

## 2019-10-21 NOTE — PHYSICAL EXAM
[Fully active, able to carry on all pre-disease performance without restriction] : Status 0 - Fully active, able to carry on all pre-disease performance without restriction [Normal] : normal appearance, no rash, nodules, vesicles, ulcers, erythema [de-identified] : bilateral mastectomy ; bilateral DMITRI reconstruction, scars well healed. [de-identified] : transverse abdominal incision healing; right sided wound opening with minimal serosanguineous secretions; left sided drain catheter in place, draining small amount of serosanguineous fluid

## 2019-10-21 NOTE — REVIEW OF SYSTEMS
[Skin Wound] : skin wound [Negative] : Neurological [de-identified] : transabdominal incision s/p DMITRI healing;  b/l DMITRI reconstructive surgery

## 2019-10-25 ENCOUNTER — OUTPATIENT (OUTPATIENT)
Dept: INPATIENT UNIT | Facility: HOSPITAL | Age: 54
LOS: 1 days | Discharge: HOME | End: 2019-10-25
Payer: COMMERCIAL

## 2019-10-25 VITALS
DIASTOLIC BLOOD PRESSURE: 82 MMHG | RESPIRATION RATE: 16 BRPM | SYSTOLIC BLOOD PRESSURE: 116 MMHG | TEMPERATURE: 98 F | OXYGEN SATURATION: 100 % | HEART RATE: 85 BPM

## 2019-10-25 VITALS
WEIGHT: 210.1 LBS | RESPIRATION RATE: 16 BRPM | DIASTOLIC BLOOD PRESSURE: 62 MMHG | OXYGEN SATURATION: 100 % | HEIGHT: 69 IN | TEMPERATURE: 98 F | SYSTOLIC BLOOD PRESSURE: 114 MMHG | HEART RATE: 81 BPM

## 2019-10-25 DIAGNOSIS — Z98.890 OTHER SPECIFIED POSTPROCEDURAL STATES: Chronic | ICD-10-CM

## 2019-10-25 DIAGNOSIS — Z90.11 ACQUIRED ABSENCE OF RIGHT BREAST AND NIPPLE: Chronic | ICD-10-CM

## 2019-10-25 DIAGNOSIS — C50.512 MALIGNANT NEOPLASM OF LOWER-OUTER QUADRANT OF LEFT FEMALE BREAST: ICD-10-CM

## 2019-10-25 DIAGNOSIS — Z98.82 BREAST IMPLANT STATUS: Chronic | ICD-10-CM

## 2019-10-25 LAB
ANION GAP SERPL CALC-SCNC: 6 MMOL/L — SIGNIFICANT CHANGE UP (ref 5–17)
APTT BLD: 31.6 SEC — SIGNIFICANT CHANGE UP (ref 27.5–36.3)
BUN SERPL-MCNC: 12 MG/DL — SIGNIFICANT CHANGE UP (ref 7–23)
CALCIUM SERPL-MCNC: 8.8 MG/DL — SIGNIFICANT CHANGE UP (ref 8.5–10.1)
CHLORIDE SERPL-SCNC: 108 MMOL/L — SIGNIFICANT CHANGE UP (ref 96–108)
CO2 SERPL-SCNC: 28 MMOL/L — SIGNIFICANT CHANGE UP (ref 22–31)
CREAT SERPL-MCNC: 0.81 MG/DL — SIGNIFICANT CHANGE UP (ref 0.5–1.3)
GLUCOSE SERPL-MCNC: 95 MG/DL — SIGNIFICANT CHANGE UP (ref 70–99)
HCT VFR BLD CALC: 37.2 % — SIGNIFICANT CHANGE UP (ref 34.5–45)
HGB BLD-MCNC: 12.1 G/DL — SIGNIFICANT CHANGE UP (ref 11.5–15.5)
INR BLD: 1.02 RATIO — SIGNIFICANT CHANGE UP (ref 0.88–1.16)
MCHC RBC-ENTMCNC: 29.2 PG — SIGNIFICANT CHANGE UP (ref 27–34)
MCHC RBC-ENTMCNC: 32.5 GM/DL — SIGNIFICANT CHANGE UP (ref 32–36)
MCV RBC AUTO: 89.6 FL — SIGNIFICANT CHANGE UP (ref 80–100)
PLATELET # BLD AUTO: 367 K/UL — SIGNIFICANT CHANGE UP (ref 150–400)
POTASSIUM SERPL-MCNC: 3.9 MMOL/L — SIGNIFICANT CHANGE UP (ref 3.5–5.3)
POTASSIUM SERPL-SCNC: 3.9 MMOL/L — SIGNIFICANT CHANGE UP (ref 3.5–5.3)
PROTHROM AB SERPL-ACNC: 11.3 SEC — SIGNIFICANT CHANGE UP (ref 10–12.9)
RBC # BLD: 4.15 M/UL — SIGNIFICANT CHANGE UP (ref 3.8–5.2)
RBC # FLD: 13.2 % — SIGNIFICANT CHANGE UP (ref 10.3–14.5)
SODIUM SERPL-SCNC: 142 MMOL/L — SIGNIFICANT CHANGE UP (ref 135–145)
WBC # BLD: 6.96 K/UL — SIGNIFICANT CHANGE UP (ref 3.8–10.5)
WBC # FLD AUTO: 6.96 K/UL — SIGNIFICANT CHANGE UP (ref 3.8–10.5)

## 2019-10-25 PROCEDURE — 36415 COLL VENOUS BLD VENIPUNCTURE: CPT

## 2019-10-25 PROCEDURE — 36561 INSERT TUNNELED CV CATH: CPT

## 2019-10-25 PROCEDURE — 76937 US GUIDE VASCULAR ACCESS: CPT | Mod: 26

## 2019-10-25 PROCEDURE — 76937 US GUIDE VASCULAR ACCESS: CPT

## 2019-10-25 PROCEDURE — 77001 FLUOROGUIDE FOR VEIN DEVICE: CPT

## 2019-10-25 PROCEDURE — C1788: CPT

## 2019-10-25 PROCEDURE — 85610 PROTHROMBIN TIME: CPT

## 2019-10-25 PROCEDURE — 77001 FLUOROGUIDE FOR VEIN DEVICE: CPT | Mod: 26

## 2019-10-25 PROCEDURE — 85027 COMPLETE CBC AUTOMATED: CPT

## 2019-10-25 PROCEDURE — 80048 BASIC METABOLIC PNL TOTAL CA: CPT

## 2019-10-25 PROCEDURE — C1769: CPT

## 2019-10-25 PROCEDURE — 85730 THROMBOPLASTIN TIME PARTIAL: CPT

## 2019-10-25 PROCEDURE — C1894: CPT

## 2019-10-25 NOTE — ASU DISCHARGE PLAN (ADULT/PEDIATRIC) - CALL YOUR DOCTOR IF YOU HAVE ANY OF THE FOLLOWING:
Swelling that gets worse/Bleeding that does not stop/Pain not relieved by Medications/Wound/Surgical Site with redness, or foul smelling discharge or pus

## 2019-10-25 NOTE — ASU DISCHARGE PLAN (ADULT/PEDIATRIC) - ASU DC SPECIAL INSTRUCTIONSFT
Can restart blood thinners tomorrow.    Do not peel off skin glue. Do not scrub incision. pat dry. skin glue falls off on its own.

## 2019-10-25 NOTE — ASU PATIENT PROFILE, ADULT - PSH
H/O removal of cyst  ovarian cyst  H/O right breast implant  s/p mastectomy  H/O total mastectomy of right breast  2003  S/P breast biopsy, left  Mastectomy/DMITRI

## 2019-10-29 ENCOUNTER — RX RENEWAL (OUTPATIENT)
Age: 54
End: 2019-10-29

## 2019-10-29 RX ORDER — RIVAROXABAN 15 MG/1
15 TABLET, FILM COATED ORAL
Qty: 42 | Refills: 0 | Status: DISCONTINUED | COMMUNITY
Start: 2019-10-02 | End: 2019-10-29

## 2019-10-29 RX ORDER — TRAMADOL HYDROCHLORIDE 50 MG/1
50 TABLET, COATED ORAL
Qty: 30 | Refills: 0 | Status: DISCONTINUED | COMMUNITY
Start: 2019-08-21 | End: 2019-10-29

## 2019-10-29 RX ORDER — ONDANSETRON 4 MG/1
4 TABLET, ORALLY DISINTEGRATING ORAL
Qty: 10 | Refills: 0 | Status: DISCONTINUED | COMMUNITY
Start: 2019-08-21 | End: 2019-10-29

## 2019-10-31 DIAGNOSIS — Z79.01 LONG TERM (CURRENT) USE OF ANTICOAGULANTS: ICD-10-CM

## 2019-10-31 DIAGNOSIS — C50.912 MALIGNANT NEOPLASM OF UNSPECIFIED SITE OF LEFT FEMALE BREAST: ICD-10-CM

## 2019-10-31 DIAGNOSIS — K21.9 GASTRO-ESOPHAGEAL REFLUX DISEASE WITHOUT ESOPHAGITIS: ICD-10-CM

## 2019-10-31 RX ORDER — SODIUM CHLORIDE 9 MG/ML
250 INJECTION INTRAMUSCULAR; INTRAVENOUS; SUBCUTANEOUS
Refills: 0 | Status: DISCONTINUED | OUTPATIENT
Start: 2019-11-05 | End: 2020-02-05

## 2019-10-31 RX ORDER — SODIUM CHLORIDE 9 MG/ML
10 INJECTION INTRAMUSCULAR; INTRAVENOUS; SUBCUTANEOUS ONCE
Refills: 0 | Status: COMPLETED | OUTPATIENT
Start: 2019-11-05 | End: 2019-11-05

## 2019-11-04 ENCOUNTER — OTHER (OUTPATIENT)
Age: 54
End: 2019-11-04

## 2019-11-05 ENCOUNTER — OUTPATIENT (OUTPATIENT)
Dept: OUTPATIENT SERVICES | Facility: HOSPITAL | Age: 54
LOS: 1 days | End: 2019-11-05
Payer: COMMERCIAL

## 2019-11-05 VITALS
SYSTOLIC BLOOD PRESSURE: 123 MMHG | DIASTOLIC BLOOD PRESSURE: 74 MMHG | WEIGHT: 209.22 LBS | HEART RATE: 102 BPM | HEIGHT: 69 IN | TEMPERATURE: 98 F | RESPIRATION RATE: 18 BRPM

## 2019-11-05 VITALS — HEART RATE: 80 BPM | SYSTOLIC BLOOD PRESSURE: 124 MMHG | DIASTOLIC BLOOD PRESSURE: 79 MMHG

## 2019-11-05 DIAGNOSIS — Z98.890 OTHER SPECIFIED POSTPROCEDURAL STATES: Chronic | ICD-10-CM

## 2019-11-05 DIAGNOSIS — Z90.11 ACQUIRED ABSENCE OF RIGHT BREAST AND NIPPLE: Chronic | ICD-10-CM

## 2019-11-05 DIAGNOSIS — R69 ILLNESS, UNSPECIFIED: ICD-10-CM

## 2019-11-05 DIAGNOSIS — Z98.82 BREAST IMPLANT STATUS: Chronic | ICD-10-CM

## 2019-11-05 LAB
ALBUMIN SERPL ELPH-MCNC: 3.4 G/DL — SIGNIFICANT CHANGE UP (ref 3.3–5)
ALP SERPL-CCNC: 61 U/L — SIGNIFICANT CHANGE UP (ref 40–120)
ALT FLD-CCNC: 15 U/L — SIGNIFICANT CHANGE UP (ref 12–78)
ANION GAP SERPL CALC-SCNC: 8 MMOL/L — SIGNIFICANT CHANGE UP (ref 5–17)
AST SERPL-CCNC: 7 U/L — LOW (ref 15–37)
BASOPHILS # BLD AUTO: 0.01 K/UL — SIGNIFICANT CHANGE UP (ref 0–0.2)
BASOPHILS NFR BLD AUTO: 0.1 % — SIGNIFICANT CHANGE UP (ref 0–2)
BILIRUB SERPL-MCNC: 0.2 MG/DL — SIGNIFICANT CHANGE UP (ref 0.2–1.2)
BUN SERPL-MCNC: 13 MG/DL — SIGNIFICANT CHANGE UP (ref 7–23)
CALCIUM SERPL-MCNC: 9.4 MG/DL — SIGNIFICANT CHANGE UP (ref 8.5–10.1)
CHLORIDE SERPL-SCNC: 105 MMOL/L — SIGNIFICANT CHANGE UP (ref 96–108)
CO2 SERPL-SCNC: 26 MMOL/L — SIGNIFICANT CHANGE UP (ref 22–31)
CREAT SERPL-MCNC: 1.1 MG/DL — SIGNIFICANT CHANGE UP (ref 0.5–1.3)
EOSINOPHIL # BLD AUTO: 0 K/UL — SIGNIFICANT CHANGE UP (ref 0–0.5)
EOSINOPHIL NFR BLD AUTO: 0 % — SIGNIFICANT CHANGE UP (ref 0–6)
ERYTHROCYTE [SEDIMENTATION RATE] IN BLOOD: 21 MM/HR — HIGH (ref 0–20)
FERRITIN SERPL-MCNC: 77 NG/ML — SIGNIFICANT CHANGE UP (ref 15–150)
FOLATE SERPL-MCNC: 5.8 NG/ML — SIGNIFICANT CHANGE UP
GLUCOSE SERPL-MCNC: 150 MG/DL — HIGH (ref 70–99)
HCT VFR BLD CALC: 39.1 % — SIGNIFICANT CHANGE UP (ref 34.5–45)
HGB BLD-MCNC: 12.7 G/DL — SIGNIFICANT CHANGE UP (ref 11.5–15.5)
IMM GRANULOCYTES NFR BLD AUTO: 0.6 % — SIGNIFICANT CHANGE UP (ref 0–1.5)
IRON SATN MFR SERPL: 15 % — SIGNIFICANT CHANGE UP (ref 14–50)
IRON SATN MFR SERPL: 48 UG/DL — SIGNIFICANT CHANGE UP (ref 30–160)
LYMPHOCYTES # BLD AUTO: 1.64 K/UL — SIGNIFICANT CHANGE UP (ref 1–3.3)
LYMPHOCYTES # BLD AUTO: 11.2 % — LOW (ref 13–44)
MCHC RBC-ENTMCNC: 28.6 PG — SIGNIFICANT CHANGE UP (ref 27–34)
MCHC RBC-ENTMCNC: 32.5 GM/DL — SIGNIFICANT CHANGE UP (ref 32–36)
MCV RBC AUTO: 88.1 FL — SIGNIFICANT CHANGE UP (ref 80–100)
MONOCYTES # BLD AUTO: 1.07 K/UL — HIGH (ref 0–0.9)
MONOCYTES NFR BLD AUTO: 7.3 % — SIGNIFICANT CHANGE UP (ref 2–14)
NEUTROPHILS # BLD AUTO: 11.81 K/UL — HIGH (ref 1.8–7.4)
NEUTROPHILS NFR BLD AUTO: 80.8 % — HIGH (ref 43–77)
PLATELET # BLD AUTO: 481 K/UL — HIGH (ref 150–400)
POTASSIUM SERPL-MCNC: 3.4 MMOL/L — LOW (ref 3.5–5.3)
POTASSIUM SERPL-SCNC: 3.4 MMOL/L — LOW (ref 3.5–5.3)
PROT SERPL-MCNC: 7.7 GM/DL — SIGNIFICANT CHANGE UP (ref 6–8.3)
RBC # BLD: 4.44 M/UL — SIGNIFICANT CHANGE UP (ref 3.8–5.2)
RBC # FLD: 12.9 % — SIGNIFICANT CHANGE UP (ref 10.3–14.5)
SODIUM SERPL-SCNC: 139 MMOL/L — SIGNIFICANT CHANGE UP (ref 135–145)
TIBC SERPL-MCNC: 324 UG/DL — SIGNIFICANT CHANGE UP (ref 220–430)
UIBC SERPL-MCNC: 276 UG/DL — SIGNIFICANT CHANGE UP (ref 110–370)
VIT B12 SERPL-MCNC: 266 PG/ML — SIGNIFICANT CHANGE UP (ref 232–1245)
WBC # BLD: 14.62 K/UL — HIGH (ref 3.8–10.5)
WBC # FLD AUTO: 14.62 K/UL — HIGH (ref 3.8–10.5)

## 2019-11-05 PROCEDURE — 82728 ASSAY OF FERRITIN: CPT

## 2019-11-05 PROCEDURE — 96417 CHEMO IV INFUS EACH ADDL SEQ: CPT

## 2019-11-05 PROCEDURE — 96367 TX/PROPH/DG ADDL SEQ IV INF: CPT

## 2019-11-05 PROCEDURE — 85025 COMPLETE CBC W/AUTO DIFF WBC: CPT

## 2019-11-05 PROCEDURE — 85652 RBC SED RATE AUTOMATED: CPT

## 2019-11-05 PROCEDURE — 36415 COLL VENOUS BLD VENIPUNCTURE: CPT

## 2019-11-05 PROCEDURE — 83550 IRON BINDING TEST: CPT

## 2019-11-05 PROCEDURE — 96413 CHEMO IV INFUSION 1 HR: CPT

## 2019-11-05 PROCEDURE — 80053 COMPREHEN METABOLIC PANEL: CPT

## 2019-11-05 PROCEDURE — 96375 TX/PRO/DX INJ NEW DRUG ADDON: CPT

## 2019-11-05 PROCEDURE — 82607 VITAMIN B-12: CPT

## 2019-11-05 PROCEDURE — 83540 ASSAY OF IRON: CPT

## 2019-11-05 PROCEDURE — 82746 ASSAY OF FOLIC ACID SERUM: CPT

## 2019-11-05 RX ORDER — CYCLOPHOSPHAMIDE 100 MG
1260 VIAL (EA) INTRAVENOUS ONCE
Refills: 0 | Status: COMPLETED | OUTPATIENT
Start: 2019-11-05 | End: 2019-11-05

## 2019-11-05 RX ORDER — RIVAROXABAN 15 MG-20MG
50 KIT ORAL
Qty: 0 | Refills: 0 | DISCHARGE

## 2019-11-05 RX ORDER — DOCETAXEL 20 MG/ML
158 INJECTION, SOLUTION, CONCENTRATE INTRAVENOUS ONCE
Refills: 0 | Status: COMPLETED | OUTPATIENT
Start: 2019-11-05 | End: 2019-11-05

## 2019-11-05 RX ORDER — PALONOSETRON HYDROCHLORIDE 0.25 MG/5ML
0.25 INJECTION, SOLUTION INTRAVENOUS ONCE
Refills: 0 | Status: COMPLETED | OUTPATIENT
Start: 2019-11-05 | End: 2019-11-05

## 2019-11-05 RX ORDER — FOSAPREPITANT DIMEGLUMINE 150 MG/5ML
150 INJECTION, POWDER, LYOPHILIZED, FOR SOLUTION INTRAVENOUS ONCE
Refills: 0 | Status: COMPLETED | OUTPATIENT
Start: 2019-11-05 | End: 2019-11-05

## 2019-11-05 RX ADMIN — Medication 1260 MILLIGRAM(S): at 14:12

## 2019-11-05 RX ADMIN — PALONOSETRON HYDROCHLORIDE 0.25 MILLIGRAM(S): 0.25 INJECTION, SOLUTION INTRAVENOUS at 11:04

## 2019-11-05 RX ADMIN — DOCETAXEL 257.9 MILLIGRAM(S): 20 INJECTION, SOLUTION, CONCENTRATE INTRAVENOUS at 11:59

## 2019-11-05 RX ADMIN — SODIUM CHLORIDE 10 MILLILITER(S): 9 INJECTION INTRAMUSCULAR; INTRAVENOUS; SUBCUTANEOUS at 14:15

## 2019-11-05 RX ADMIN — SODIUM CHLORIDE 250 MILLILITER(S): 9 INJECTION INTRAMUSCULAR; INTRAVENOUS; SUBCUTANEOUS at 14:15

## 2019-11-05 RX ADMIN — Medication 500 UNIT(S): at 14:15

## 2019-11-05 RX ADMIN — FOSAPREPITANT DIMEGLUMINE 150 MILLIGRAM(S): 150 INJECTION, POWDER, LYOPHILIZED, FOR SOLUTION INTRAVENOUS at 11:24

## 2019-11-05 RX ADMIN — FOSAPREPITANT DIMEGLUMINE 310 MILLIGRAM(S): 150 INJECTION, POWDER, LYOPHILIZED, FOR SOLUTION INTRAVENOUS at 11:04

## 2019-11-05 RX ADMIN — Medication 313 MILLIGRAM(S): at 13:12

## 2019-11-05 RX ADMIN — SODIUM CHLORIDE 30 MILLILITER(S): 9 INJECTION INTRAMUSCULAR; INTRAVENOUS; SUBCUTANEOUS at 10:40

## 2019-11-05 RX ADMIN — DOCETAXEL 158 MILLIGRAM(S): 20 INJECTION, SOLUTION, CONCENTRATE INTRAVENOUS at 13:00

## 2019-11-05 NOTE — DISCHARGE INSTRUCTIONS: CHEMOTHERAPY - NSRNDCMEDINSTRUCTIONS8_HEME_A_AMB
Take 1 claritin tablet daily startin on day of neulasta and for 7 days to minimize side  effects of neulasta

## 2019-11-05 NOTE — DISCHARGE INSTRUCTIONS: CHEMOTHERAPY - NSRNDCMEDINSTRUCTIONS5_HEME_A_AMB
take 1 tablet every 6 hours if needed for nausea.  Call office if no improvement or if vomiting develops

## 2019-11-05 NOTE — DISCHARGE INSTRUCTIONS: CHEMOTHERAPY - INFUSION CENTER DC DIET INSTRUCTIONS
Drink plenty of non caffeinated fluids, recommend 2 L daily.  Be sure to empty bladder frequently and completely after chemo to avoid irritation from cytoxan.

## 2019-11-05 NOTE — INFUSION NURSING HISTORY - TEACHING/LEARNING FACTORS INFLUENCE READINESS TO LEARN OTHER
[address] [facility]    Bonifacio Voss  MR#: [id number]  : 1929  ACCOUNT #: [de-identified]   DATE OF SERVICE: 2018    FINDINGS:  1. Normal sinus rhythm. Minimum heart rate of 55 beats per minute, average heart rate of 72 beats per minute, maximum heart rate of 130 beats per minute. 2.  Rare isolated PVCs with one couplet noted. 3.  Rare isolated PACs with 5 runs of around 5-6 beat SVT, fastest rate of 132 beats per minute noted. No significant supraventricular dysrhythmias. QRS intervals normal.  AV node conduction normal.  4.  Symptom correlation:  None reported. CONCLUSION:  No evidence of significant supraventricular dysrhythmias, rare premature ventricular contractions and rare 5-6 beats of supraventricular tachycardia at 132 beats per minute.       Vianey Mcmillan MD       [address] [place] [address] / Ana Tesfaye  D: 2018 16:39     T: 2018 21:30  JOB #: [id number]
none

## 2019-11-05 NOTE — DISCHARGE INSTRUCTIONS: CHEMOTHERAPY - NSRNDCMEDINSTRUCTIONS9_HEME_A_AMB
take 2 tabs am and pm day before, day of and day after every chemo. Must take with food.  (continue to take tonight and tomorrow for this cycle.

## 2019-11-06 DIAGNOSIS — R69 ILLNESS, UNSPECIFIED: ICD-10-CM

## 2019-11-07 DIAGNOSIS — R11.2 NAUSEA WITH VOMITING, UNSPECIFIED: ICD-10-CM

## 2019-11-07 DIAGNOSIS — Z51.11 ENCOUNTER FOR ANTINEOPLASTIC CHEMOTHERAPY: ICD-10-CM

## 2019-11-07 DIAGNOSIS — C50.512 MALIGNANT NEOPLASM OF LOWER-OUTER QUADRANT OF LEFT FEMALE BREAST: ICD-10-CM

## 2019-11-08 ENCOUNTER — OUTPATIENT (OUTPATIENT)
Dept: OUTPATIENT SERVICES | Facility: HOSPITAL | Age: 54
LOS: 1 days | End: 2019-11-08
Payer: COMMERCIAL

## 2019-11-08 VITALS
WEIGHT: 209.88 LBS | SYSTOLIC BLOOD PRESSURE: 100 MMHG | RESPIRATION RATE: 18 BRPM | DIASTOLIC BLOOD PRESSURE: 67 MMHG | HEIGHT: 69 IN | TEMPERATURE: 98 F | HEART RATE: 111 BPM

## 2019-11-08 VITALS — SYSTOLIC BLOOD PRESSURE: 100 MMHG | DIASTOLIC BLOOD PRESSURE: 66 MMHG | HEART RATE: 85 BPM

## 2019-11-08 DIAGNOSIS — Z98.82 BREAST IMPLANT STATUS: Chronic | ICD-10-CM

## 2019-11-08 DIAGNOSIS — Z98.890 OTHER SPECIFIED POSTPROCEDURAL STATES: Chronic | ICD-10-CM

## 2019-11-08 DIAGNOSIS — Z90.11 ACQUIRED ABSENCE OF RIGHT BREAST AND NIPPLE: Chronic | ICD-10-CM

## 2019-11-08 DIAGNOSIS — R69 ILLNESS, UNSPECIFIED: ICD-10-CM

## 2019-11-08 DIAGNOSIS — C50.512 MALIGNANT NEOPLASM OF LOWER-OUTER QUADRANT OF LEFT FEMALE BREAST: ICD-10-CM

## 2019-11-08 PROCEDURE — 96360 HYDRATION IV INFUSION INIT: CPT

## 2019-11-08 PROCEDURE — 96372 THER/PROPH/DIAG INJ SC/IM: CPT

## 2019-11-08 RX ORDER — SODIUM CHLORIDE 9 MG/ML
1000 INJECTION INTRAMUSCULAR; INTRAVENOUS; SUBCUTANEOUS
Refills: 0 | Status: COMPLETED | OUTPATIENT
Start: 2019-11-08 | End: 2019-11-08

## 2019-11-08 RX ORDER — PEGFILGRASTIM-CBQV 6 MG/.6ML
6 INJECTION, SOLUTION SUBCUTANEOUS ONCE
Refills: 0 | Status: COMPLETED | OUTPATIENT
Start: 2019-11-08 | End: 2019-11-08

## 2019-11-08 RX ORDER — PREGABALIN 225 MG/1
1000 CAPSULE ORAL ONCE
Refills: 0 | Status: COMPLETED | OUTPATIENT
Start: 2019-11-08 | End: 2019-11-08

## 2019-11-08 RX ADMIN — PREGABALIN 1000 MICROGRAM(S): 225 CAPSULE ORAL at 10:48

## 2019-11-08 RX ADMIN — SODIUM CHLORIDE 1000 MILLILITER(S): 9 INJECTION INTRAMUSCULAR; INTRAVENOUS; SUBCUTANEOUS at 12:12

## 2019-11-08 RX ADMIN — PEGFILGRASTIM-CBQV 6 MILLIGRAM(S): 6 INJECTION, SOLUTION SUBCUTANEOUS at 10:50

## 2019-11-08 RX ADMIN — SODIUM CHLORIDE 500 MILLILITER(S): 9 INJECTION INTRAMUSCULAR; INTRAVENOUS; SUBCUTANEOUS at 10:39

## 2019-11-11 DIAGNOSIS — E86.0 DEHYDRATION: ICD-10-CM

## 2019-11-11 DIAGNOSIS — D70.1 AGRANULOCYTOSIS SECONDARY TO CANCER CHEMOTHERAPY: ICD-10-CM

## 2019-11-11 DIAGNOSIS — E53.8 DEFICIENCY OF OTHER SPECIFIED B GROUP VITAMINS: ICD-10-CM

## 2019-11-19 ENCOUNTER — APPOINTMENT (OUTPATIENT)
Age: 54
End: 2019-11-19
Payer: COMMERCIAL

## 2019-11-19 VITALS
HEIGHT: 69 IN | WEIGHT: 206 LBS | TEMPERATURE: 97.9 F | DIASTOLIC BLOOD PRESSURE: 72 MMHG | HEART RATE: 98 BPM | SYSTOLIC BLOOD PRESSURE: 107 MMHG | RESPIRATION RATE: 16 BRPM | BODY MASS INDEX: 30.51 KG/M2

## 2019-11-19 PROCEDURE — 99214 OFFICE O/P EST MOD 30 MIN: CPT

## 2019-11-19 NOTE — HISTORY OF PRESENT ILLNESS
[Disease: _____________________] : Disease: [unfilled] [T: ___] : T[unfilled] [N: ___] : N[unfilled] [AJCC Stage: ____] : AJCC Stage: [unfilled] [de-identified] : 54 F with history right breast cancer at age 37, s/p right mastectomy with saline implant reconstruction, and nipple reconstruction with left nipple graft, s/p adjuvant chemotherapy x 6 months (Dr. Rene White), referred for medical oncologic consultation after diagnosis of left breast carcinoma in September 2019. \par \par CASE SYNOPSIS: \par 5/29/03- segmental resection right breast for moderately differentiated infiltrating ductal carcinoma; margin involved by infiltrating tumor.\par \par 7/10/03- right total mastectomy with saline implant reconstruction( Meghna Townsend/ Benny Grover). Received adjuvant systemic chemotherapy ( records to be obtained), under the care of Dr. Don White.\par \par 10/2018- reports lower left breast itching.\par \par 10/31/18- mammogram/ breast US: no suspicious lesions, no evidence of malignancy, intramammary lymph node 2:00 left breast.\par \par 6/24/19 MRI breast – right implant intact; left breast 5:00 N8, 2 adjacent masses 1, and 1.5 cm; abnormal clumped enhancement extending from masses medially, multiple additional masses throughout left breast: 6:00 N4 8 mm, 3:00 N6 1 cm, 2:00 N6 7 mm, 12:00 N6 9 mm, 10:00 N4 8 mm. Multiple small axillary lymph nodes.\par \par 6/25/19- DEXA scan: low bone mass based on left femoral neck T score (-1.2 SD from the mean).\par \par 7/9/19: left breast US and US-guided core biopsy; left axilla with sonographically normal lymph node. \par Left 4-5:00 N6 9 x 7 x 10 mm and irregular inferior mass 18 x 9 x 14 mm. Pathology: \par Left 4-5:00 superior- infiltrating ductal carcinoma, SBR 6/9, ER90%, [place] 90%, Her 2 negative. \par Left 4-5:00 inferior - infiltrating ductal carcinoma, SBR 6/9, ER90%, [place] 70%, Her 2 negative.\par \par 9/12/19: left mastectomy with sentinel node biopsy and removal of both implants with bilateral DMITRI flap reconstruction. \par Pathology: left breast with 2 foci of invasive ductal carcinoma 2.5 cm and 1.2 cm , SBR 6/9, with foci of DCIS in UOQ, + LVI and PNI, margins negative, 1 / 2 SLN positive ( 8 mm ; no extracapsular extension), 2 more negative non- sentinel LN,  right and left internal mammary nodes negative.(pT2, pN1, Mx = stage IIB)\par \par 10/1/19- develops venous thrombosis in the basilic vein, superficial, extended for at least 10 cm.  LYMPHA procedure postponed. Started on anticoagulation.\par \par 10/2/19: PET/CT consistent with FDG avidity associated with the reconstructed bilateral breast in the anterior abdominal wall; no FDG avid local regional or distant metastatic disease.\par \par 10/17/19 – chemoeducation session.\par \par 11/5/19- started systemic therapy docetaxel/ cyclophosphamide\par  [de-identified] : infiltrating ductal carcinoma [de-identified] : ER 90%, AR 90%, Her 2 negative [FreeTextEntry1] : docetaxel/ cyclophosphamide [de-identified] : Last seen in office in October 2019; she received first dose of Docetaxel/ cyclophosphamide on 11/5. Post treatment she developed Refractory nausea, headache after she took prochlorperazine, increased moodiness and irritability, which subsided to alprazolam, and a macular rash on anterior and slightly on the posterior torso, extremely pruriginous.she is scheduled for cycle 2 of chemotherapy on 11/25/19. Her rash is subsiding. She is less nauseated,but will not take prochlorperazine again due to side effects. Appetite and weight unchanged. Patient did not return to work yet.

## 2019-11-19 NOTE — REVIEW OF SYSTEMS
[Skin Wound] : skin wound [Negative] : Neurological [de-identified] : transabdominal incision s/p DMITRI healing;  b/l DMITRI reconstructive surgery

## 2019-11-19 NOTE — PHYSICAL EXAM
[Fully active, able to carry on all pre-disease performance without restriction] : Status 0 - Fully active, able to carry on all pre-disease performance without restriction [Normal] : full range of motion and no deformities appreciated [de-identified] : bilateral mastectomy ; bilateral DMITRI reconstruction, scars well healed. [de-identified] : transverse abdominal incision healing; right sided wound opening with minimal serosanguineous secretions; left sided drain catheter in place, draining small amount of serosanguineous fluid [de-identified] : scattered macular lesion on anterior and posterior torso.

## 2019-11-19 NOTE — RESULTS/DATA
[FreeTextEntry1] : I reviewed recent blood work results with patient.\par \par 11/5/19:\par WBC 14.62, hemoglobin 12.7 g/dL, hematocrit 39.1%, platelets 481 pounds\par \par 9/13/19: \par WBC 16.23, hemoglobin 10.7 g/dL, hematocrit 33%, platelet 343,000

## 2019-11-25 RX ORDER — SODIUM CHLORIDE 9 MG/ML
250 INJECTION INTRAMUSCULAR; INTRAVENOUS; SUBCUTANEOUS
Refills: 0 | Status: DISCONTINUED | OUTPATIENT
Start: 2019-11-26 | End: 2020-03-03

## 2019-11-26 ENCOUNTER — OUTPATIENT (OUTPATIENT)
Dept: OUTPATIENT SERVICES | Facility: HOSPITAL | Age: 54
LOS: 1 days | End: 2019-11-26
Payer: COMMERCIAL

## 2019-11-26 VITALS
HEIGHT: 69 IN | TEMPERATURE: 98 F | DIASTOLIC BLOOD PRESSURE: 77 MMHG | SYSTOLIC BLOOD PRESSURE: 112 MMHG | HEART RATE: 92 BPM | WEIGHT: 207.23 LBS | RESPIRATION RATE: 18 BRPM

## 2019-11-26 DIAGNOSIS — C50.512 MALIGNANT NEOPLASM OF LOWER-OUTER QUADRANT OF LEFT FEMALE BREAST: ICD-10-CM

## 2019-11-26 DIAGNOSIS — R69 ILLNESS, UNSPECIFIED: ICD-10-CM

## 2019-11-26 DIAGNOSIS — Z98.82 BREAST IMPLANT STATUS: Chronic | ICD-10-CM

## 2019-11-26 DIAGNOSIS — Z90.11 ACQUIRED ABSENCE OF RIGHT BREAST AND NIPPLE: Chronic | ICD-10-CM

## 2019-11-26 DIAGNOSIS — Z98.890 OTHER SPECIFIED POSTPROCEDURAL STATES: Chronic | ICD-10-CM

## 2019-11-26 LAB
ALBUMIN SERPL ELPH-MCNC: 3.7 G/DL — SIGNIFICANT CHANGE UP (ref 3.3–5)
ALP SERPL-CCNC: 58 U/L — SIGNIFICANT CHANGE UP (ref 40–120)
ALT FLD-CCNC: 25 U/L — SIGNIFICANT CHANGE UP (ref 12–78)
ANION GAP SERPL CALC-SCNC: 8 MMOL/L — SIGNIFICANT CHANGE UP (ref 5–17)
AST SERPL-CCNC: 9 U/L — LOW (ref 15–37)
BASOPHILS # BLD AUTO: 0.01 K/UL — SIGNIFICANT CHANGE UP (ref 0–0.2)
BASOPHILS NFR BLD AUTO: 0.1 % — SIGNIFICANT CHANGE UP (ref 0–2)
BILIRUB SERPL-MCNC: 0.2 MG/DL — SIGNIFICANT CHANGE UP (ref 0.2–1.2)
BUN SERPL-MCNC: 15 MG/DL — SIGNIFICANT CHANGE UP (ref 7–23)
CALCIUM SERPL-MCNC: 9.3 MG/DL — SIGNIFICANT CHANGE UP (ref 8.5–10.1)
CHLORIDE SERPL-SCNC: 108 MMOL/L — SIGNIFICANT CHANGE UP (ref 96–108)
CO2 SERPL-SCNC: 24 MMOL/L — SIGNIFICANT CHANGE UP (ref 22–31)
CREAT SERPL-MCNC: 0.86 MG/DL — SIGNIFICANT CHANGE UP (ref 0.5–1.3)
EOSINOPHIL # BLD AUTO: 0.02 K/UL — SIGNIFICANT CHANGE UP (ref 0–0.5)
EOSINOPHIL NFR BLD AUTO: 0.2 % — SIGNIFICANT CHANGE UP (ref 0–6)
GLUCOSE SERPL-MCNC: 128 MG/DL — HIGH (ref 70–99)
HCT VFR BLD CALC: 36.3 % — SIGNIFICANT CHANGE UP (ref 34.5–45)
HGB BLD-MCNC: 11.9 G/DL — SIGNIFICANT CHANGE UP (ref 11.5–15.5)
IMM GRANULOCYTES NFR BLD AUTO: 1.1 % — SIGNIFICANT CHANGE UP (ref 0–1.5)
LYMPHOCYTES # BLD AUTO: 1.3 K/UL — SIGNIFICANT CHANGE UP (ref 1–3.3)
LYMPHOCYTES # BLD AUTO: 12.4 % — LOW (ref 13–44)
MCHC RBC-ENTMCNC: 28.6 PG — SIGNIFICANT CHANGE UP (ref 27–34)
MCHC RBC-ENTMCNC: 32.8 GM/DL — SIGNIFICANT CHANGE UP (ref 32–36)
MCV RBC AUTO: 87.3 FL — SIGNIFICANT CHANGE UP (ref 80–100)
MONOCYTES # BLD AUTO: 0.5 K/UL — SIGNIFICANT CHANGE UP (ref 0–0.9)
MONOCYTES NFR BLD AUTO: 4.8 % — SIGNIFICANT CHANGE UP (ref 2–14)
NEUTROPHILS # BLD AUTO: 8.5 K/UL — HIGH (ref 1.8–7.4)
NEUTROPHILS NFR BLD AUTO: 81.4 % — HIGH (ref 43–77)
PLATELET # BLD AUTO: 668 K/UL — HIGH (ref 150–400)
POTASSIUM SERPL-MCNC: 4 MMOL/L — SIGNIFICANT CHANGE UP (ref 3.5–5.3)
POTASSIUM SERPL-SCNC: 4 MMOL/L — SIGNIFICANT CHANGE UP (ref 3.5–5.3)
PROT SERPL-MCNC: 7.7 GM/DL — SIGNIFICANT CHANGE UP (ref 6–8.3)
RBC # BLD: 4.16 M/UL — SIGNIFICANT CHANGE UP (ref 3.8–5.2)
RBC # FLD: 13.8 % — SIGNIFICANT CHANGE UP (ref 10.3–14.5)
SODIUM SERPL-SCNC: 140 MMOL/L — SIGNIFICANT CHANGE UP (ref 135–145)
WBC # BLD: 10.45 K/UL — SIGNIFICANT CHANGE UP (ref 3.8–10.5)
WBC # FLD AUTO: 10.45 K/UL — SIGNIFICANT CHANGE UP (ref 3.8–10.5)

## 2019-11-26 PROCEDURE — 36415 COLL VENOUS BLD VENIPUNCTURE: CPT

## 2019-11-26 PROCEDURE — 85025 COMPLETE CBC W/AUTO DIFF WBC: CPT

## 2019-11-26 PROCEDURE — 96367 TX/PROPH/DG ADDL SEQ IV INF: CPT

## 2019-11-26 PROCEDURE — 80053 COMPREHEN METABOLIC PANEL: CPT

## 2019-11-26 PROCEDURE — 96413 CHEMO IV INFUSION 1 HR: CPT

## 2019-11-26 PROCEDURE — 96417 CHEMO IV INFUS EACH ADDL SEQ: CPT

## 2019-11-26 PROCEDURE — 96375 TX/PRO/DX INJ NEW DRUG ADDON: CPT

## 2019-11-26 RX ORDER — PROCHLORPERAZINE MALEATE 5 MG
10 TABLET ORAL
Qty: 0 | Refills: 0 | DISCHARGE

## 2019-11-26 RX ORDER — SODIUM CHLORIDE 9 MG/ML
10 INJECTION INTRAMUSCULAR; INTRAVENOUS; SUBCUTANEOUS ONCE
Refills: 0 | Status: COMPLETED | OUTPATIENT
Start: 2019-11-26 | End: 2019-11-26

## 2019-11-26 RX ORDER — PEGFILGRASTIM-CBQV 6 MG/.6ML
6 INJECTION, SOLUTION SUBCUTANEOUS ONCE
Refills: 0 | Status: DISCONTINUED | OUTPATIENT
Start: 2019-11-26 | End: 2019-11-26

## 2019-11-26 RX ORDER — PEGFILGRASTIM-CBQV 6 MG/.6ML
6 INJECTION, SOLUTION SUBCUTANEOUS ONCE
Refills: 0 | Status: COMPLETED | OUTPATIENT
Start: 2019-11-29 | End: 2019-11-29

## 2019-11-26 RX ORDER — CYCLOPHOSPHAMIDE 100 MG
1260 VIAL (EA) INTRAVENOUS ONCE
Refills: 0 | Status: COMPLETED | OUTPATIENT
Start: 2019-11-26 | End: 2019-11-26

## 2019-11-26 RX ORDER — PALONOSETRON HYDROCHLORIDE 0.25 MG/5ML
0.25 INJECTION, SOLUTION INTRAVENOUS ONCE
Refills: 0 | Status: COMPLETED | OUTPATIENT
Start: 2019-11-26 | End: 2019-11-26

## 2019-11-26 RX ORDER — DOCETAXEL 20 MG/ML
158 INJECTION, SOLUTION, CONCENTRATE INTRAVENOUS ONCE
Refills: 0 | Status: COMPLETED | OUTPATIENT
Start: 2019-11-26 | End: 2019-11-26

## 2019-11-26 RX ORDER — FOSAPREPITANT DIMEGLUMINE 150 MG/5ML
150 INJECTION, POWDER, LYOPHILIZED, FOR SOLUTION INTRAVENOUS ONCE
Refills: 0 | Status: COMPLETED | OUTPATIENT
Start: 2019-11-26 | End: 2019-11-26

## 2019-11-26 RX ADMIN — DOCETAXEL 257.9 MILLIGRAM(S): 20 INJECTION, SOLUTION, CONCENTRATE INTRAVENOUS at 12:16

## 2019-11-26 RX ADMIN — FOSAPREPITANT DIMEGLUMINE 150 MILLIGRAM(S): 150 INJECTION, POWDER, LYOPHILIZED, FOR SOLUTION INTRAVENOUS at 12:05

## 2019-11-26 RX ADMIN — Medication 1260 MILLIGRAM(S): at 14:40

## 2019-11-26 RX ADMIN — PALONOSETRON HYDROCHLORIDE 0.25 MILLIGRAM(S): 0.25 INJECTION, SOLUTION INTRAVENOUS at 11:40

## 2019-11-26 RX ADMIN — Medication 500 UNIT(S): at 14:50

## 2019-11-26 RX ADMIN — Medication 313 MILLIGRAM(S): at 13:25

## 2019-11-26 RX ADMIN — DOCETAXEL 158 MILLIGRAM(S): 20 INJECTION, SOLUTION, CONCENTRATE INTRAVENOUS at 13:20

## 2019-11-26 RX ADMIN — SODIUM CHLORIDE 30 MILLILITER(S): 9 INJECTION INTRAMUSCULAR; INTRAVENOUS; SUBCUTANEOUS at 11:12

## 2019-11-26 RX ADMIN — SODIUM CHLORIDE 10 MILLILITER(S): 9 INJECTION INTRAMUSCULAR; INTRAVENOUS; SUBCUTANEOUS at 14:48

## 2019-11-26 RX ADMIN — FOSAPREPITANT DIMEGLUMINE 310 MILLIGRAM(S): 150 INJECTION, POWDER, LYOPHILIZED, FOR SOLUTION INTRAVENOUS at 11:41

## 2019-11-26 RX ADMIN — SODIUM CHLORIDE 250 MILLILITER(S): 9 INJECTION INTRAMUSCULAR; INTRAVENOUS; SUBCUTANEOUS at 14:50

## 2019-11-29 ENCOUNTER — OUTPATIENT (OUTPATIENT)
Dept: OUTPATIENT SERVICES | Facility: HOSPITAL | Age: 54
LOS: 1 days | End: 2019-11-29
Payer: COMMERCIAL

## 2019-11-29 VITALS — HEART RATE: 94 BPM | SYSTOLIC BLOOD PRESSURE: 108 MMHG | TEMPERATURE: 98 F | DIASTOLIC BLOOD PRESSURE: 70 MMHG

## 2019-11-29 VITALS — HEART RATE: 94 BPM | DIASTOLIC BLOOD PRESSURE: 66 MMHG | SYSTOLIC BLOOD PRESSURE: 103 MMHG

## 2019-11-29 DIAGNOSIS — R11.2 NAUSEA WITH VOMITING, UNSPECIFIED: ICD-10-CM

## 2019-11-29 DIAGNOSIS — C50.512 MALIGNANT NEOPLASM OF LOWER-OUTER QUADRANT OF LEFT FEMALE BREAST: ICD-10-CM

## 2019-11-29 DIAGNOSIS — Z51.11 ENCOUNTER FOR ANTINEOPLASTIC CHEMOTHERAPY: ICD-10-CM

## 2019-11-29 DIAGNOSIS — R69 ILLNESS, UNSPECIFIED: ICD-10-CM

## 2019-11-29 DIAGNOSIS — Z90.11 ACQUIRED ABSENCE OF RIGHT BREAST AND NIPPLE: Chronic | ICD-10-CM

## 2019-11-29 DIAGNOSIS — Z98.890 OTHER SPECIFIED POSTPROCEDURAL STATES: Chronic | ICD-10-CM

## 2019-11-29 DIAGNOSIS — Z98.82 BREAST IMPLANT STATUS: Chronic | ICD-10-CM

## 2019-11-29 PROCEDURE — 96372 THER/PROPH/DIAG INJ SC/IM: CPT

## 2019-11-29 PROCEDURE — 96360 HYDRATION IV INFUSION INIT: CPT

## 2019-11-29 RX ORDER — SODIUM CHLORIDE 9 MG/ML
500 INJECTION INTRAMUSCULAR; INTRAVENOUS; SUBCUTANEOUS
Refills: 0 | Status: COMPLETED | OUTPATIENT
Start: 2019-11-29 | End: 2019-11-29

## 2019-11-29 RX ADMIN — SODIUM CHLORIDE 500 MILLILITER(S): 9 INJECTION INTRAMUSCULAR; INTRAVENOUS; SUBCUTANEOUS at 12:01

## 2019-11-29 RX ADMIN — PEGFILGRASTIM-CBQV 6 MILLIGRAM(S): 6 INJECTION, SOLUTION SUBCUTANEOUS at 11:55

## 2019-11-29 RX ADMIN — SODIUM CHLORIDE 500 MILLILITER(S): 9 INJECTION INTRAMUSCULAR; INTRAVENOUS; SUBCUTANEOUS at 10:50

## 2019-12-02 DIAGNOSIS — D70.1 AGRANULOCYTOSIS SECONDARY TO CANCER CHEMOTHERAPY: ICD-10-CM

## 2019-12-02 DIAGNOSIS — E86.0 DEHYDRATION: ICD-10-CM

## 2019-12-06 ENCOUNTER — APPOINTMENT (OUTPATIENT)
Dept: OBGYN | Facility: CLINIC | Age: 54
End: 2019-12-06
Payer: COMMERCIAL

## 2019-12-06 VITALS
OXYGEN SATURATION: 98 % | HEIGHT: 69 IN | WEIGHT: 207 LBS | BODY MASS INDEX: 30.66 KG/M2 | SYSTOLIC BLOOD PRESSURE: 100 MMHG | RESPIRATION RATE: 17 BRPM | DIASTOLIC BLOOD PRESSURE: 70 MMHG

## 2019-12-06 DIAGNOSIS — Z80.3 FAMILY HISTORY OF MALIGNANT NEOPLASM OF BREAST: ICD-10-CM

## 2019-12-06 PROCEDURE — 99214 OFFICE O/P EST MOD 30 MIN: CPT

## 2019-12-06 NOTE — PHYSICAL EXAM
[Alert] : alert [Awake] : awake [Mass] : no breast mass [Acute Distress] : no acute distress [Axillary LAD] : no axillary lymphadenopathy [Nipple Discharge] : no nipple discharge [Tender] : non tender [Oriented x3] : oriented to person, place, and time [Soft] : soft [Vulvar Atrophy] : vulvar atrophy [Normal] : uterus [No Bleeding] : there was no active vaginal bleeding [Atrophy] : atrophy [Uterine Adnexae] : were not tender and not enlarged

## 2019-12-06 NOTE — CHIEF COMPLAINT
[Follow Up] : follow up GYN visit [FreeTextEntry1] : Patient is a 54-year-old female presents for 6 month interval surveillance visit. Patient with a personal history of breast cancer status post bilateral mastectomy and reconstruction. Patient also has a significant family history mother with breast cancer. Patient Diann cancer screening testing was negative. Patient has no complaints

## 2019-12-16 RX ORDER — SODIUM CHLORIDE 9 MG/ML
10 INJECTION INTRAMUSCULAR; INTRAVENOUS; SUBCUTANEOUS ONCE
Refills: 0 | Status: COMPLETED | OUTPATIENT
Start: 2019-12-17 | End: 2019-12-17

## 2019-12-16 RX ORDER — SODIUM CHLORIDE 9 MG/ML
250 INJECTION INTRAMUSCULAR; INTRAVENOUS; SUBCUTANEOUS
Refills: 0 | Status: DISCONTINUED | OUTPATIENT
Start: 2019-12-17 | End: 2020-01-07

## 2019-12-17 ENCOUNTER — OUTPATIENT (OUTPATIENT)
Dept: OUTPATIENT SERVICES | Facility: HOSPITAL | Age: 54
LOS: 1 days | End: 2019-12-17
Payer: COMMERCIAL

## 2019-12-17 VITALS
WEIGHT: 206.79 LBS | DIASTOLIC BLOOD PRESSURE: 73 MMHG | SYSTOLIC BLOOD PRESSURE: 110 MMHG | HEIGHT: 69 IN | TEMPERATURE: 98 F | HEART RATE: 94 BPM

## 2019-12-17 VITALS — SYSTOLIC BLOOD PRESSURE: 120 MMHG | HEART RATE: 86 BPM | DIASTOLIC BLOOD PRESSURE: 72 MMHG

## 2019-12-17 DIAGNOSIS — Z98.890 OTHER SPECIFIED POSTPROCEDURAL STATES: Chronic | ICD-10-CM

## 2019-12-17 DIAGNOSIS — R69 ILLNESS, UNSPECIFIED: ICD-10-CM

## 2019-12-17 DIAGNOSIS — Z98.82 BREAST IMPLANT STATUS: Chronic | ICD-10-CM

## 2019-12-17 DIAGNOSIS — C50.512 MALIGNANT NEOPLASM OF LOWER-OUTER QUADRANT OF LEFT FEMALE BREAST: ICD-10-CM

## 2019-12-17 DIAGNOSIS — Z90.11 ACQUIRED ABSENCE OF RIGHT BREAST AND NIPPLE: Chronic | ICD-10-CM

## 2019-12-17 LAB
ALBUMIN SERPL ELPH-MCNC: 3.3 G/DL — SIGNIFICANT CHANGE UP (ref 3.3–5)
ALP SERPL-CCNC: 51 U/L — SIGNIFICANT CHANGE UP (ref 40–120)
ALT FLD-CCNC: 18 U/L — SIGNIFICANT CHANGE UP (ref 12–78)
ANION GAP SERPL CALC-SCNC: 6 MMOL/L — SIGNIFICANT CHANGE UP (ref 5–17)
AST SERPL-CCNC: 6 U/L — LOW (ref 15–37)
BASOPHILS # BLD AUTO: 0.01 K/UL — SIGNIFICANT CHANGE UP (ref 0–0.2)
BASOPHILS NFR BLD AUTO: 0.1 % — SIGNIFICANT CHANGE UP (ref 0–2)
BILIRUB SERPL-MCNC: 0.3 MG/DL — SIGNIFICANT CHANGE UP (ref 0.2–1.2)
BUN SERPL-MCNC: 12 MG/DL — SIGNIFICANT CHANGE UP (ref 7–23)
CALCIUM SERPL-MCNC: 9.2 MG/DL — SIGNIFICANT CHANGE UP (ref 8.5–10.1)
CHLORIDE SERPL-SCNC: 108 MMOL/L — SIGNIFICANT CHANGE UP (ref 96–108)
CO2 SERPL-SCNC: 25 MMOL/L — SIGNIFICANT CHANGE UP (ref 22–31)
CREAT SERPL-MCNC: 0.76 MG/DL — SIGNIFICANT CHANGE UP (ref 0.5–1.3)
EOSINOPHIL # BLD AUTO: 0 K/UL — SIGNIFICANT CHANGE UP (ref 0–0.5)
EOSINOPHIL NFR BLD AUTO: 0 % — SIGNIFICANT CHANGE UP (ref 0–6)
GLUCOSE SERPL-MCNC: 121 MG/DL — HIGH (ref 70–99)
HCT VFR BLD CALC: 34.1 % — LOW (ref 34.5–45)
HGB BLD-MCNC: 10.9 G/DL — LOW (ref 11.5–15.5)
IMM GRANULOCYTES NFR BLD AUTO: 0.9 % — SIGNIFICANT CHANGE UP (ref 0–1.5)
LYMPHOCYTES # BLD AUTO: 1 K/UL — SIGNIFICANT CHANGE UP (ref 1–3.3)
LYMPHOCYTES # BLD AUTO: 10.2 % — LOW (ref 13–44)
MCHC RBC-ENTMCNC: 27.9 PG — SIGNIFICANT CHANGE UP (ref 27–34)
MCHC RBC-ENTMCNC: 32 GM/DL — SIGNIFICANT CHANGE UP (ref 32–36)
MCV RBC AUTO: 87.2 FL — SIGNIFICANT CHANGE UP (ref 80–100)
MONOCYTES # BLD AUTO: 0.87 K/UL — SIGNIFICANT CHANGE UP (ref 0–0.9)
MONOCYTES NFR BLD AUTO: 8.9 % — SIGNIFICANT CHANGE UP (ref 2–14)
NEUTROPHILS # BLD AUTO: 7.84 K/UL — HIGH (ref 1.8–7.4)
NEUTROPHILS NFR BLD AUTO: 79.9 % — HIGH (ref 43–77)
PLATELET # BLD AUTO: 550 K/UL — HIGH (ref 150–400)
POTASSIUM SERPL-MCNC: 3.7 MMOL/L — SIGNIFICANT CHANGE UP (ref 3.5–5.3)
POTASSIUM SERPL-SCNC: 3.7 MMOL/L — SIGNIFICANT CHANGE UP (ref 3.5–5.3)
PROT SERPL-MCNC: 7.3 GM/DL — SIGNIFICANT CHANGE UP (ref 6–8.3)
RBC # BLD: 3.91 M/UL — SIGNIFICANT CHANGE UP (ref 3.8–5.2)
RBC # FLD: 15.8 % — HIGH (ref 10.3–14.5)
SODIUM SERPL-SCNC: 139 MMOL/L — SIGNIFICANT CHANGE UP (ref 135–145)
WBC # BLD: 9.81 K/UL — SIGNIFICANT CHANGE UP (ref 3.8–10.5)
WBC # FLD AUTO: 9.81 K/UL — SIGNIFICANT CHANGE UP (ref 3.8–10.5)

## 2019-12-17 PROCEDURE — 36415 COLL VENOUS BLD VENIPUNCTURE: CPT

## 2019-12-17 PROCEDURE — 96367 TX/PROPH/DG ADDL SEQ IV INF: CPT

## 2019-12-17 PROCEDURE — 80053 COMPREHEN METABOLIC PANEL: CPT

## 2019-12-17 PROCEDURE — 96417 CHEMO IV INFUS EACH ADDL SEQ: CPT

## 2019-12-17 PROCEDURE — 96375 TX/PRO/DX INJ NEW DRUG ADDON: CPT

## 2019-12-17 PROCEDURE — 96413 CHEMO IV INFUSION 1 HR: CPT

## 2019-12-17 PROCEDURE — 85025 COMPLETE CBC W/AUTO DIFF WBC: CPT

## 2019-12-17 RX ORDER — CYCLOPHOSPHAMIDE 100 MG
1260 VIAL (EA) INTRAVENOUS ONCE
Refills: 0 | Status: COMPLETED | OUTPATIENT
Start: 2019-12-17 | End: 2019-12-17

## 2019-12-17 RX ORDER — FOSAPREPITANT DIMEGLUMINE 150 MG/5ML
150 INJECTION, POWDER, LYOPHILIZED, FOR SOLUTION INTRAVENOUS ONCE
Refills: 0 | Status: COMPLETED | OUTPATIENT
Start: 2019-12-17 | End: 2019-12-17

## 2019-12-17 RX ORDER — DOCETAXEL 20 MG/ML
158 INJECTION, SOLUTION, CONCENTRATE INTRAVENOUS ONCE
Refills: 0 | Status: COMPLETED | OUTPATIENT
Start: 2019-12-17 | End: 2019-12-17

## 2019-12-17 RX ORDER — PALONOSETRON HYDROCHLORIDE 0.25 MG/5ML
0.25 INJECTION, SOLUTION INTRAVENOUS ONCE
Refills: 0 | Status: COMPLETED | OUTPATIENT
Start: 2019-12-17 | End: 2019-12-17

## 2019-12-17 RX ADMIN — DOCETAXEL 257.9 MILLIGRAM(S): 20 INJECTION, SOLUTION, CONCENTRATE INTRAVENOUS at 12:09

## 2019-12-17 RX ADMIN — Medication 313 MILLIGRAM(S): at 13:15

## 2019-12-17 RX ADMIN — SODIUM CHLORIDE 10 MILLILITER(S): 9 INJECTION INTRAMUSCULAR; INTRAVENOUS; SUBCUTANEOUS at 14:37

## 2019-12-17 RX ADMIN — FOSAPREPITANT DIMEGLUMINE 150 MILLIGRAM(S): 150 INJECTION, POWDER, LYOPHILIZED, FOR SOLUTION INTRAVENOUS at 11:55

## 2019-12-17 RX ADMIN — DOCETAXEL 158 MILLIGRAM(S): 20 INJECTION, SOLUTION, CONCENTRATE INTRAVENOUS at 13:14

## 2019-12-17 RX ADMIN — SODIUM CHLORIDE 250 MILLILITER(S): 9 INJECTION INTRAMUSCULAR; INTRAVENOUS; SUBCUTANEOUS at 14:37

## 2019-12-17 RX ADMIN — SODIUM CHLORIDE 30 MILLILITER(S): 9 INJECTION INTRAMUSCULAR; INTRAVENOUS; SUBCUTANEOUS at 10:40

## 2019-12-17 RX ADMIN — Medication 1260 MILLIGRAM(S): at 14:25

## 2019-12-17 RX ADMIN — FOSAPREPITANT DIMEGLUMINE 310 MILLIGRAM(S): 150 INJECTION, POWDER, LYOPHILIZED, FOR SOLUTION INTRAVENOUS at 11:35

## 2019-12-17 RX ADMIN — Medication 500 UNIT(S): at 14:38

## 2019-12-17 RX ADMIN — PALONOSETRON HYDROCHLORIDE 0.25 MILLIGRAM(S): 0.25 INJECTION, SOLUTION INTRAVENOUS at 11:34

## 2019-12-18 ENCOUNTER — OTHER (OUTPATIENT)
Age: 54
End: 2019-12-18

## 2019-12-18 DIAGNOSIS — Z09 ENCOUNTER FOR FOLLOW-UP EXAMINATION AFTER COMPLETED TREATMENT FOR CONDITIONS OTHER THAN MALIGNANT NEOPLASM: ICD-10-CM

## 2019-12-18 DIAGNOSIS — Z51.11 ENCOUNTER FOR ANTINEOPLASTIC CHEMOTHERAPY: ICD-10-CM

## 2019-12-18 DIAGNOSIS — R11.2 NAUSEA WITH VOMITING, UNSPECIFIED: ICD-10-CM

## 2019-12-18 DIAGNOSIS — Z86.018 PERSONAL HISTORY OF OTHER BENIGN NEOPLASM: ICD-10-CM

## 2019-12-18 RX ORDER — SODIUM CHLORIDE 9 MG/ML
1000 INJECTION INTRAMUSCULAR; INTRAVENOUS; SUBCUTANEOUS
Refills: 0 | Status: COMPLETED | OUTPATIENT
Start: 2019-12-20 | End: 2019-12-20

## 2019-12-18 RX ORDER — PEGFILGRASTIM-CBQV 6 MG/.6ML
6 INJECTION, SOLUTION SUBCUTANEOUS ONCE
Refills: 0 | Status: COMPLETED | OUTPATIENT
Start: 2019-12-20 | End: 2019-12-20

## 2019-12-19 ENCOUNTER — OUTPATIENT (OUTPATIENT)
Dept: OUTPATIENT SERVICES | Facility: HOSPITAL | Age: 54
LOS: 1 days | Discharge: HOME | End: 2019-12-19

## 2019-12-19 DIAGNOSIS — Z17.0 ESTROGEN RECEPTOR POSITIVE STATUS [ER+]: ICD-10-CM

## 2019-12-19 DIAGNOSIS — Z98.82 BREAST IMPLANT STATUS: Chronic | ICD-10-CM

## 2019-12-19 DIAGNOSIS — Z98.890 OTHER SPECIFIED POSTPROCEDURAL STATES: Chronic | ICD-10-CM

## 2019-12-19 DIAGNOSIS — C50.512 MALIGNANT NEOPLASM OF LOWER-OUTER QUADRANT OF LEFT FEMALE BREAST: ICD-10-CM

## 2019-12-19 DIAGNOSIS — Z90.11 ACQUIRED ABSENCE OF RIGHT BREAST AND NIPPLE: Chronic | ICD-10-CM

## 2019-12-20 ENCOUNTER — OUTPATIENT (OUTPATIENT)
Dept: OUTPATIENT SERVICES | Facility: HOSPITAL | Age: 54
LOS: 1 days | End: 2019-12-20
Payer: COMMERCIAL

## 2019-12-20 VITALS
WEIGHT: 209.66 LBS | TEMPERATURE: 98 F | HEART RATE: 97 BPM | SYSTOLIC BLOOD PRESSURE: 125 MMHG | DIASTOLIC BLOOD PRESSURE: 70 MMHG

## 2019-12-20 VITALS — SYSTOLIC BLOOD PRESSURE: 112 MMHG | HEART RATE: 85 BPM | DIASTOLIC BLOOD PRESSURE: 60 MMHG

## 2019-12-20 DIAGNOSIS — R69 ILLNESS, UNSPECIFIED: ICD-10-CM

## 2019-12-20 DIAGNOSIS — Z98.82 BREAST IMPLANT STATUS: Chronic | ICD-10-CM

## 2019-12-20 DIAGNOSIS — Z98.890 OTHER SPECIFIED POSTPROCEDURAL STATES: Chronic | ICD-10-CM

## 2019-12-20 DIAGNOSIS — C50.512 MALIGNANT NEOPLASM OF LOWER-OUTER QUADRANT OF LEFT FEMALE BREAST: ICD-10-CM

## 2019-12-20 DIAGNOSIS — Z90.11 ACQUIRED ABSENCE OF RIGHT BREAST AND NIPPLE: Chronic | ICD-10-CM

## 2019-12-20 PROCEDURE — 96360 HYDRATION IV INFUSION INIT: CPT

## 2019-12-20 PROCEDURE — 96372 THER/PROPH/DIAG INJ SC/IM: CPT

## 2019-12-20 RX ADMIN — SODIUM CHLORIDE 500 MILLILITER(S): 9 INJECTION INTRAMUSCULAR; INTRAVENOUS; SUBCUTANEOUS at 13:08

## 2019-12-20 RX ADMIN — PEGFILGRASTIM-CBQV 6 MILLIGRAM(S): 6 INJECTION, SOLUTION SUBCUTANEOUS at 15:22

## 2019-12-23 DIAGNOSIS — D70.1 AGRANULOCYTOSIS SECONDARY TO CANCER CHEMOTHERAPY: ICD-10-CM

## 2019-12-23 DIAGNOSIS — E86.0 DEHYDRATION: ICD-10-CM

## 2020-01-07 ENCOUNTER — OUTPATIENT (OUTPATIENT)
Dept: OUTPATIENT SERVICES | Facility: HOSPITAL | Age: 55
LOS: 1 days | End: 2020-01-07
Payer: COMMERCIAL

## 2020-01-07 VITALS
HEIGHT: 69 IN | TEMPERATURE: 98 F | DIASTOLIC BLOOD PRESSURE: 75 MMHG | SYSTOLIC BLOOD PRESSURE: 144 MMHG | WEIGHT: 206.57 LBS | RESPIRATION RATE: 18 BRPM | HEART RATE: 121 BPM

## 2020-01-07 VITALS — DIASTOLIC BLOOD PRESSURE: 79 MMHG | HEART RATE: 85 BPM | RESPIRATION RATE: 16 BRPM | SYSTOLIC BLOOD PRESSURE: 126 MMHG

## 2020-01-07 DIAGNOSIS — Z98.890 OTHER SPECIFIED POSTPROCEDURAL STATES: Chronic | ICD-10-CM

## 2020-01-07 DIAGNOSIS — Z98.82 BREAST IMPLANT STATUS: Chronic | ICD-10-CM

## 2020-01-07 DIAGNOSIS — C50.512 MALIGNANT NEOPLASM OF LOWER-OUTER QUADRANT OF LEFT FEMALE BREAST: ICD-10-CM

## 2020-01-07 DIAGNOSIS — R69 ILLNESS, UNSPECIFIED: ICD-10-CM

## 2020-01-07 DIAGNOSIS — Z90.11 ACQUIRED ABSENCE OF RIGHT BREAST AND NIPPLE: Chronic | ICD-10-CM

## 2020-01-07 LAB
ALBUMIN SERPL ELPH-MCNC: 3.4 G/DL — SIGNIFICANT CHANGE UP (ref 3.3–5)
ALP SERPL-CCNC: 51 U/L — SIGNIFICANT CHANGE UP (ref 40–120)
ALT FLD-CCNC: 19 U/L — SIGNIFICANT CHANGE UP (ref 12–78)
ANION GAP SERPL CALC-SCNC: 10 MMOL/L — SIGNIFICANT CHANGE UP (ref 5–17)
AST SERPL-CCNC: 8 U/L — LOW (ref 15–37)
BASOPHILS # BLD AUTO: 0.01 K/UL — SIGNIFICANT CHANGE UP (ref 0–0.2)
BASOPHILS NFR BLD AUTO: 0.1 % — SIGNIFICANT CHANGE UP (ref 0–2)
BILIRUB SERPL-MCNC: 0.3 MG/DL — SIGNIFICANT CHANGE UP (ref 0.2–1.2)
BUN SERPL-MCNC: 19 MG/DL — SIGNIFICANT CHANGE UP (ref 7–23)
CALCIUM SERPL-MCNC: 9.4 MG/DL — SIGNIFICANT CHANGE UP (ref 8.5–10.1)
CHLORIDE SERPL-SCNC: 108 MMOL/L — SIGNIFICANT CHANGE UP (ref 96–108)
CO2 SERPL-SCNC: 21 MMOL/L — LOW (ref 22–31)
CREAT SERPL-MCNC: 0.88 MG/DL — SIGNIFICANT CHANGE UP (ref 0.5–1.3)
EOSINOPHIL # BLD AUTO: 0.01 K/UL — SIGNIFICANT CHANGE UP (ref 0–0.5)
EOSINOPHIL NFR BLD AUTO: 0.1 % — SIGNIFICANT CHANGE UP (ref 0–6)
GLUCOSE SERPL-MCNC: 152 MG/DL — HIGH (ref 70–99)
HCT VFR BLD CALC: 35.6 % — SIGNIFICANT CHANGE UP (ref 34.5–45)
HGB BLD-MCNC: 11.5 G/DL — SIGNIFICANT CHANGE UP (ref 11.5–15.5)
IMM GRANULOCYTES NFR BLD AUTO: 0.6 % — SIGNIFICANT CHANGE UP (ref 0–1.5)
LYMPHOCYTES # BLD AUTO: 0.8 K/UL — LOW (ref 1–3.3)
LYMPHOCYTES # BLD AUTO: 7.6 % — LOW (ref 13–44)
MCHC RBC-ENTMCNC: 28.5 PG — SIGNIFICANT CHANGE UP (ref 27–34)
MCHC RBC-ENTMCNC: 32.3 GM/DL — SIGNIFICANT CHANGE UP (ref 32–36)
MCV RBC AUTO: 88.3 FL — SIGNIFICANT CHANGE UP (ref 80–100)
MONOCYTES # BLD AUTO: 0.97 K/UL — HIGH (ref 0–0.9)
MONOCYTES NFR BLD AUTO: 9.2 % — SIGNIFICANT CHANGE UP (ref 2–14)
NEUTROPHILS # BLD AUTO: 8.71 K/UL — HIGH (ref 1.8–7.4)
NEUTROPHILS NFR BLD AUTO: 82.4 % — HIGH (ref 43–77)
PLATELET # BLD AUTO: 504 K/UL — HIGH (ref 150–400)
POTASSIUM SERPL-MCNC: 3.7 MMOL/L — SIGNIFICANT CHANGE UP (ref 3.5–5.3)
POTASSIUM SERPL-SCNC: 3.7 MMOL/L — SIGNIFICANT CHANGE UP (ref 3.5–5.3)
PROT SERPL-MCNC: 7.1 GM/DL — SIGNIFICANT CHANGE UP (ref 6–8.3)
RBC # BLD: 4.03 M/UL — SIGNIFICANT CHANGE UP (ref 3.8–5.2)
RBC # FLD: 17.8 % — HIGH (ref 10.3–14.5)
SODIUM SERPL-SCNC: 139 MMOL/L — SIGNIFICANT CHANGE UP (ref 135–145)
WBC # BLD: 10.56 K/UL — HIGH (ref 3.8–10.5)
WBC # FLD AUTO: 10.56 K/UL — HIGH (ref 3.8–10.5)

## 2020-01-07 PROCEDURE — 96367 TX/PROPH/DG ADDL SEQ IV INF: CPT

## 2020-01-07 PROCEDURE — 36415 COLL VENOUS BLD VENIPUNCTURE: CPT

## 2020-01-07 PROCEDURE — 96417 CHEMO IV INFUS EACH ADDL SEQ: CPT

## 2020-01-07 PROCEDURE — 96413 CHEMO IV INFUSION 1 HR: CPT

## 2020-01-07 PROCEDURE — 80053 COMPREHEN METABOLIC PANEL: CPT

## 2020-01-07 PROCEDURE — 85025 COMPLETE CBC W/AUTO DIFF WBC: CPT

## 2020-01-07 PROCEDURE — 96375 TX/PRO/DX INJ NEW DRUG ADDON: CPT

## 2020-01-07 RX ORDER — FOSAPREPITANT DIMEGLUMINE 150 MG/5ML
150 INJECTION, POWDER, LYOPHILIZED, FOR SOLUTION INTRAVENOUS ONCE
Refills: 0 | Status: COMPLETED | OUTPATIENT
Start: 2020-01-07 | End: 2020-01-07

## 2020-01-07 RX ORDER — SODIUM CHLORIDE 9 MG/ML
10 INJECTION INTRAMUSCULAR; INTRAVENOUS; SUBCUTANEOUS ONCE
Refills: 0 | Status: COMPLETED | OUTPATIENT
Start: 2020-01-07 | End: 2020-01-07

## 2020-01-07 RX ORDER — DOCETAXEL 20 MG/ML
158 INJECTION, SOLUTION, CONCENTRATE INTRAVENOUS ONCE
Refills: 0 | Status: COMPLETED | OUTPATIENT
Start: 2020-01-07 | End: 2020-01-07

## 2020-01-07 RX ORDER — CYCLOPHOSPHAMIDE 100 MG
1260 VIAL (EA) INTRAVENOUS ONCE
Refills: 0 | Status: COMPLETED | OUTPATIENT
Start: 2020-01-07 | End: 2020-01-07

## 2020-01-07 RX ORDER — PALONOSETRON HYDROCHLORIDE 0.25 MG/5ML
0.25 INJECTION, SOLUTION INTRAVENOUS ONCE
Refills: 0 | Status: COMPLETED | OUTPATIENT
Start: 2020-01-07 | End: 2020-01-07

## 2020-01-07 RX ORDER — SODIUM CHLORIDE 9 MG/ML
250 INJECTION INTRAMUSCULAR; INTRAVENOUS; SUBCUTANEOUS
Refills: 0 | Status: DISCONTINUED | OUTPATIENT
Start: 2020-01-07 | End: 2020-04-08

## 2020-01-07 RX ADMIN — SODIUM CHLORIDE 10 MILLILITER(S): 9 INJECTION INTRAMUSCULAR; INTRAVENOUS; SUBCUTANEOUS at 14:36

## 2020-01-07 RX ADMIN — Medication 313 MILLIGRAM(S): at 13:27

## 2020-01-07 RX ADMIN — DOCETAXEL 257.9 MILLIGRAM(S): 20 INJECTION, SOLUTION, CONCENTRATE INTRAVENOUS at 12:17

## 2020-01-07 RX ADMIN — FOSAPREPITANT DIMEGLUMINE 310 MILLIGRAM(S): 150 INJECTION, POWDER, LYOPHILIZED, FOR SOLUTION INTRAVENOUS at 11:44

## 2020-01-07 RX ADMIN — SODIUM CHLORIDE 30 MILLILITER(S): 9 INJECTION INTRAMUSCULAR; INTRAVENOUS; SUBCUTANEOUS at 11:20

## 2020-01-07 RX ADMIN — PALONOSETRON HYDROCHLORIDE 0.25 MILLIGRAM(S): 0.25 INJECTION, SOLUTION INTRAVENOUS at 11:40

## 2020-01-07 RX ADMIN — Medication 1260 MILLIGRAM(S): at 14:30

## 2020-01-07 RX ADMIN — DOCETAXEL 158 MILLIGRAM(S): 20 INJECTION, SOLUTION, CONCENTRATE INTRAVENOUS at 13:17

## 2020-01-07 RX ADMIN — Medication 500 UNIT(S): at 14:37

## 2020-01-07 RX ADMIN — FOSAPREPITANT DIMEGLUMINE 150 MILLIGRAM(S): 150 INJECTION, POWDER, LYOPHILIZED, FOR SOLUTION INTRAVENOUS at 12:05

## 2020-01-07 RX ADMIN — SODIUM CHLORIDE 250 MILLILITER(S): 9 INJECTION INTRAMUSCULAR; INTRAVENOUS; SUBCUTANEOUS at 14:33

## 2020-01-08 DIAGNOSIS — Z51.11 ENCOUNTER FOR ANTINEOPLASTIC CHEMOTHERAPY: ICD-10-CM

## 2020-01-08 DIAGNOSIS — R11.2 NAUSEA WITH VOMITING, UNSPECIFIED: ICD-10-CM

## 2020-01-10 ENCOUNTER — OUTPATIENT (OUTPATIENT)
Dept: OUTPATIENT SERVICES | Facility: HOSPITAL | Age: 55
LOS: 1 days | End: 2020-01-10
Payer: COMMERCIAL

## 2020-01-10 VITALS
HEART RATE: 103 BPM | DIASTOLIC BLOOD PRESSURE: 71 MMHG | WEIGHT: 206.35 LBS | TEMPERATURE: 98 F | SYSTOLIC BLOOD PRESSURE: 119 MMHG

## 2020-01-10 VITALS — HEART RATE: 102 BPM | DIASTOLIC BLOOD PRESSURE: 66 MMHG | SYSTOLIC BLOOD PRESSURE: 101 MMHG

## 2020-01-10 DIAGNOSIS — C50.512 MALIGNANT NEOPLASM OF LOWER-OUTER QUADRANT OF LEFT FEMALE BREAST: ICD-10-CM

## 2020-01-10 DIAGNOSIS — Z98.890 OTHER SPECIFIED POSTPROCEDURAL STATES: Chronic | ICD-10-CM

## 2020-01-10 DIAGNOSIS — R69 ILLNESS, UNSPECIFIED: ICD-10-CM

## 2020-01-10 DIAGNOSIS — Z98.82 BREAST IMPLANT STATUS: Chronic | ICD-10-CM

## 2020-01-10 DIAGNOSIS — Z90.11 ACQUIRED ABSENCE OF RIGHT BREAST AND NIPPLE: Chronic | ICD-10-CM

## 2020-01-10 PROCEDURE — 96361 HYDRATE IV INFUSION ADD-ON: CPT

## 2020-01-10 PROCEDURE — 96372 THER/PROPH/DIAG INJ SC/IM: CPT

## 2020-01-10 PROCEDURE — 96360 HYDRATION IV INFUSION INIT: CPT

## 2020-01-10 RX ORDER — PEGFILGRASTIM-CBQV 6 MG/.6ML
6 INJECTION, SOLUTION SUBCUTANEOUS ONCE
Refills: 0 | Status: DISCONTINUED | OUTPATIENT
Start: 2020-01-10 | End: 2020-01-10

## 2020-01-10 RX ORDER — SODIUM CHLORIDE 9 MG/ML
1000 INJECTION INTRAMUSCULAR; INTRAVENOUS; SUBCUTANEOUS
Refills: 0 | Status: COMPLETED | OUTPATIENT
Start: 2020-01-10 | End: 2020-01-10

## 2020-01-10 RX ORDER — PEGFILGRASTIM-CBQV 6 MG/.6ML
6 INJECTION, SOLUTION SUBCUTANEOUS ONCE
Refills: 0 | Status: COMPLETED | OUTPATIENT
Start: 2020-01-10 | End: 2020-01-10

## 2020-01-10 RX ADMIN — SODIUM CHLORIDE 500 MILLILITER(S): 9 INJECTION INTRAMUSCULAR; INTRAVENOUS; SUBCUTANEOUS at 11:10

## 2020-01-10 RX ADMIN — PEGFILGRASTIM-CBQV 6 MILLIGRAM(S): 6 INJECTION, SOLUTION SUBCUTANEOUS at 13:00

## 2020-01-10 RX ADMIN — SODIUM CHLORIDE 1000 MILLILITER(S): 9 INJECTION INTRAMUSCULAR; INTRAVENOUS; SUBCUTANEOUS at 13:05

## 2020-01-13 ENCOUNTER — APPOINTMENT (OUTPATIENT)
Age: 55
End: 2020-01-13
Payer: COMMERCIAL

## 2020-01-13 VITALS
SYSTOLIC BLOOD PRESSURE: 111 MMHG | RESPIRATION RATE: 18 BRPM | HEART RATE: 112 BPM | BODY MASS INDEX: 30.36 KG/M2 | WEIGHT: 205 LBS | TEMPERATURE: 98.3 F | DIASTOLIC BLOOD PRESSURE: 71 MMHG | HEIGHT: 69 IN

## 2020-01-13 DIAGNOSIS — D70.1 AGRANULOCYTOSIS SECONDARY TO CANCER CHEMOTHERAPY: ICD-10-CM

## 2020-01-13 PROCEDURE — 99214 OFFICE O/P EST MOD 30 MIN: CPT

## 2020-01-13 RX ORDER — RIVAROXABAN 20 MG/1
20 TABLET, FILM COATED ORAL
Qty: 20 | Refills: 0 | Status: DISCONTINUED | COMMUNITY
Start: 2019-12-10 | End: 2020-01-13
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
## 2020-01-13 NOTE — PHYSICAL EXAM
[Fully active, able to carry on all pre-disease performance without restriction] : Status 0 - Fully active, able to carry on all pre-disease performance without restriction [Normal] : full range of motion and no deformities appreciated [de-identified] : bilateral mastectomy ; bilateral DMITRI reconstruction, scars well healed. [de-identified] : alopecia; rash on anterior and posterior torso resolved. [de-identified] : transverse abdominal incision healing; right sided wound opening with minimal serosanguineous secretions; left sided drain catheter in place, draining small amount of serosanguineous fluid

## 2020-01-13 NOTE — HISTORY OF PRESENT ILLNESS
[Disease: _____________________] : Disease: [unfilled] [T: ___] : T[unfilled] [N: ___] : N[unfilled] [AJCC Stage: ____] : AJCC Stage: [unfilled] [de-identified] : 54 F with history right breast cancer at age 37, s/p right mastectomy with saline implant reconstruction, and nipple reconstruction with left nipple graft, s/p adjuvant chemotherapy x 6 months (Dr. Rene White), referred for medical oncologic consultation after diagnosis of left breast carcinoma in September 2019. \par \par CASE SYNOPSIS: \par 5/29/03- segmental resection right breast for moderately differentiated infiltrating ductal carcinoma; margin involved by infiltrating tumor.\par \par 7/10/03- right total mastectomy with saline implant reconstruction( Meghna oTwnsend/ Benny Grovre). Received adjuvant systemic chemotherapy ( records to be obtained), under the care of Dr. Don White.\par \par 10/2018- reports lower left breast itching.\par \par 10/31/18- mammogram/ breast US: no suspicious lesions, no evidence of malignancy, intramammary lymph node 2:00 left breast.\par \par 6/24/19 MRI breast – right implant intact; left breast 5:00 N8, 2 adjacent masses 1, and 1.5 cm; abnormal clumped enhancement extending from masses medially, multiple additional masses throughout left breast: 6:00 N4 8 mm, 3:00 N6 1 cm, 2:00 N6 7 mm, 12:00 N6 9 mm, 10:00 N4 8 mm. Multiple small axillary lymph nodes.\par \par 6/25/19- DEXA scan: low bone mass based on left femoral neck T score (-1.2 SD from the mean).\par \par 7/9/19: left breast US and US-guided core biopsy; left axilla with sonographically normal lymph node. \par Left 4-5:00 N6 9 x 7 x 10 mm and irregular inferior mass 18 x 9 x 14 mm. Pathology: \par Left 4-5:00 superior- infiltrating ductal carcinoma, SBR 6/9, ER90%, [place] 90%, Her 2 negative. \par Left 4-5:00 inferior - infiltrating ductal carcinoma, SBR 6/9, ER90%, [place] 70%, Her 2 negative.\par \par 9/12/19: left mastectomy with sentinel node biopsy and removal of both implants with bilateral DMITRI flap reconstruction. \par Pathology: left breast with 2 foci of invasive ductal carcinoma 2.5 cm and 1.2 cm , SBR 6/9, with foci of DCIS in UOQ, + LVI and PNI, margins negative, 1 / 2 SLN positive ( 8 mm ; no extracapsular extension), 2 more negative non- sentinel LN,  right and left internal mammary nodes negative.(pT2, pN1, Mx = stage IIB)\par \par 10/1/19- develops venous thrombosis in the basilic vein, superficial, extended for at least 10 cm.  LYMPHA procedure postponed. Started on anticoagulation.\par \par 10/2/19: PET/CT consistent with FDG avidity associated with the reconstructed bilateral breast in the anterior abdominal wall; no FDG avid local regional or distant metastatic disease.\par \par 10/17/19 – chemo- education session.\par \par 11/5/19- started systemic therapy docetaxel /cyclophosphamide\par  [de-identified] : infiltrating ductal carcinoma [de-identified] : ER 90%, [place] 90%, Her 2 negative [FreeTextEntry1] : docetaxel/ cyclophosphamide [de-identified] : Here for follow up; last seen in office in November 2019. Ms. RODRIGUEZ is doing well, received her last cycle (#4) docetaxel /cyclophosphamide on 1/7/20. Patient compliant with Rivaroxaban from beginning of October for right superficial venous thrombosis. Denies excessive bleeding. Endorses no significant side effects from systemic chemotherapy. The rash on anterior and posterior torso has subsided once she has changed her soap. Reports no changes in appetite or weight. Patient  resumed work. Laboratory tests consistent with normal serum tumor marker (CA 27- 29 ) so far. Patient unhappy with breast reconstruction; will make follow up appointment with Dr. Rodriguez for nipple tattooing.

## 2020-01-13 NOTE — ASSESSMENT
[FreeTextEntry1] : Ms. RODRIGUEZ will return to the office at the completion of cycle # 6 systemic therapy, at the end of February 2020.\par

## 2020-01-13 NOTE — REVIEW OF SYSTEMS
[Skin Wound] : skin wound [Negative] : Musculoskeletal [de-identified] : transabdominal incision s/p DMITRI healing;  b/l DMITRI reconstructive surgery

## 2020-01-14 DIAGNOSIS — E86.0 DEHYDRATION: ICD-10-CM

## 2020-01-17 ENCOUNTER — APPOINTMENT (OUTPATIENT)
Dept: BREAST CENTER | Facility: CLINIC | Age: 55
End: 2020-01-17
Payer: COMMERCIAL

## 2020-01-17 VITALS
WEIGHT: 205 LBS | DIASTOLIC BLOOD PRESSURE: 74 MMHG | SYSTOLIC BLOOD PRESSURE: 118 MMHG | BODY MASS INDEX: 30.36 KG/M2 | HEART RATE: 97 BPM | HEIGHT: 69 IN

## 2020-01-17 PROCEDURE — 99213 OFFICE O/P EST LOW 20 MIN: CPT

## 2020-01-17 NOTE — HISTORY OF PRESENT ILLNESS
[FreeTextEntry1] : This is a 54 year old  female who has a history of right breast cancer at age 37 treated with mastectomy/saline implant  reconstruction (Dr. Townsend/Reilly) and 6 months of chemotherapy (Dr. White- one drug was "red").  The right nipple was reconstructed with a left nipple graft. \par \par She complained of left breast itching in the lower breast since October 2018.  She had a mammogram and ultrasound that were negative.  She changed gynecologists to Dr. Faust in June and still complained of itching, so he sent her for an MRI. The MRI showed a number of abnormalities and a biopsy of 2 adjacent areas confirmed cancer. She has always had small pimple like lesions of her left nipple. She had a left breast saline implant that had been ruptured for at least 2 years.\par \par She underwent a left mastectomy with sentinel node biopsy and removal of both implants with bilateral DMITRI flap reconstructions on 9/12/2019.  She had two areas of cancer up to 2.5 cm.  One sentinel node was positive on final pathology.  ER 90% ID 70% Her 2 neg, Oncotype score was 24 (19%).\par \par She was scheduled for a left axillary dissection with LYMPHA procedure.  She was seen on Oct 2 complaining of a tender cord in her right forearm that she noticed about 5 days prior.  She had an extensive superficial phlebitis of her basilic vein.  She was started on Xarelto and the surgery was canceled.\par \par She started chemotherapy with TC and completed 4/6 cycles.  She is tolerating it well.\par \par She feels her breasts are too large.

## 2020-01-17 NOTE — PHYSICAL EXAM
[EOMI] : extra ocular movement intact [Supple] : supple [Sclera nonicteric] : sclera nonicteric [No Cervical Adenopathy] : no cervical adenopathy [No Thyromegaly] : no thyromegaly [No Supraclavicular Adenopathy] : no supraclavicular adenopathy [Clear to Auscultation Bilat] : clear to auscultation bilaterally [No dominant masses] : no dominant masses in right breast  [No dominant masses] : no dominant masses left breast [No Axillary Lymphadenopathy] : no right axillary lymphadenopathy [de-identified] : Circular skin islands.  Medial and lateral scars. [de-identified] : Circular skin islands. Medial and lateral scars.\par Mediport. [de-identified] : Transverse scar. [de-identified] : Phlebitis right arm resolved.

## 2020-01-17 NOTE — CONSULT LETTER
[Consult Letter:] : I had the pleasure of evaluating your patient, [unfilled]. [Dear  ___] : Dear  [unfilled], [DrPepe  ___] : Dr. CANELA [Sincerely,] : Sincerely, [DrPepe ___] : Dr. CANELA

## 2020-01-17 NOTE — PAST MEDICAL HISTORY
[Menarche Age ____] : age at menarche was [unfilled] [Live Births ___] : P[unfilled]  [Total Preg ___] : G[unfilled] [Age At Live Birth ___] : Age at live birth: [unfilled] [FreeTextEntry2] : 3 daughters [FreeTextEntry7] : no

## 2020-01-27 RX ORDER — SODIUM CHLORIDE 9 MG/ML
10 INJECTION INTRAMUSCULAR; INTRAVENOUS; SUBCUTANEOUS ONCE
Refills: 0 | Status: COMPLETED | OUTPATIENT
Start: 2020-01-28 | End: 2020-01-31

## 2020-01-27 RX ORDER — SODIUM CHLORIDE 9 MG/ML
250 INJECTION INTRAMUSCULAR; INTRAVENOUS; SUBCUTANEOUS
Refills: 0 | Status: DISCONTINUED | OUTPATIENT
Start: 2020-01-28 | End: 2020-04-29

## 2020-01-28 ENCOUNTER — OUTPATIENT (OUTPATIENT)
Dept: OUTPATIENT SERVICES | Facility: HOSPITAL | Age: 55
LOS: 1 days | End: 2020-01-28
Payer: COMMERCIAL

## 2020-01-28 VITALS
WEIGHT: 210.1 LBS | SYSTOLIC BLOOD PRESSURE: 115 MMHG | HEART RATE: 114 BPM | TEMPERATURE: 98 F | DIASTOLIC BLOOD PRESSURE: 71 MMHG | HEIGHT: 69 IN

## 2020-01-28 DIAGNOSIS — Z98.890 OTHER SPECIFIED POSTPROCEDURAL STATES: Chronic | ICD-10-CM

## 2020-01-28 DIAGNOSIS — Z98.82 BREAST IMPLANT STATUS: Chronic | ICD-10-CM

## 2020-01-28 DIAGNOSIS — C50.512 MALIGNANT NEOPLASM OF LOWER-OUTER QUADRANT OF LEFT FEMALE BREAST: ICD-10-CM

## 2020-01-28 DIAGNOSIS — Z90.11 ACQUIRED ABSENCE OF RIGHT BREAST AND NIPPLE: Chronic | ICD-10-CM

## 2020-01-28 DIAGNOSIS — R69 ILLNESS, UNSPECIFIED: ICD-10-CM

## 2020-01-28 LAB
ALBUMIN SERPL ELPH-MCNC: 3.4 G/DL — SIGNIFICANT CHANGE UP (ref 3.3–5)
ALP SERPL-CCNC: 52 U/L — SIGNIFICANT CHANGE UP (ref 40–120)
ALT FLD-CCNC: 19 U/L — SIGNIFICANT CHANGE UP (ref 12–78)
ANION GAP SERPL CALC-SCNC: 6 MMOL/L — SIGNIFICANT CHANGE UP (ref 5–17)
AST SERPL-CCNC: 9 U/L — LOW (ref 15–37)
BASOPHILS # BLD AUTO: 0.01 K/UL — SIGNIFICANT CHANGE UP (ref 0–0.2)
BASOPHILS NFR BLD AUTO: 0.1 % — SIGNIFICANT CHANGE UP (ref 0–2)
BILIRUB SERPL-MCNC: 0.3 MG/DL — SIGNIFICANT CHANGE UP (ref 0.2–1.2)
BUN SERPL-MCNC: 17 MG/DL — SIGNIFICANT CHANGE UP (ref 7–23)
CALCIUM SERPL-MCNC: 8.9 MG/DL — SIGNIFICANT CHANGE UP (ref 8.5–10.1)
CHLORIDE SERPL-SCNC: 110 MMOL/L — HIGH (ref 96–108)
CO2 SERPL-SCNC: 23 MMOL/L — SIGNIFICANT CHANGE UP (ref 22–31)
CREAT SERPL-MCNC: 0.81 MG/DL — SIGNIFICANT CHANGE UP (ref 0.5–1.3)
EOSINOPHIL # BLD AUTO: 0 K/UL — SIGNIFICANT CHANGE UP (ref 0–0.5)
EOSINOPHIL NFR BLD AUTO: 0 % — SIGNIFICANT CHANGE UP (ref 0–6)
GLUCOSE SERPL-MCNC: 135 MG/DL — HIGH (ref 70–99)
HCT VFR BLD CALC: 34.4 % — LOW (ref 34.5–45)
HGB BLD-MCNC: 11.4 G/DL — LOW (ref 11.5–15.5)
IMM GRANULOCYTES NFR BLD AUTO: 0.7 % — SIGNIFICANT CHANGE UP (ref 0–1.5)
LYMPHOCYTES # BLD AUTO: 0.56 K/UL — LOW (ref 1–3.3)
LYMPHOCYTES # BLD AUTO: 4.3 % — LOW (ref 13–44)
MCHC RBC-ENTMCNC: 29.3 PG — SIGNIFICANT CHANGE UP (ref 27–34)
MCHC RBC-ENTMCNC: 33.1 GM/DL — SIGNIFICANT CHANGE UP (ref 32–36)
MCV RBC AUTO: 88.4 FL — SIGNIFICANT CHANGE UP (ref 80–100)
MONOCYTES # BLD AUTO: 0.34 K/UL — SIGNIFICANT CHANGE UP (ref 0–0.9)
MONOCYTES NFR BLD AUTO: 2.6 % — SIGNIFICANT CHANGE UP (ref 2–14)
NEUTROPHILS # BLD AUTO: 11.96 K/UL — HIGH (ref 1.8–7.4)
NEUTROPHILS NFR BLD AUTO: 92.3 % — HIGH (ref 43–77)
PLATELET # BLD AUTO: 421 K/UL — HIGH (ref 150–400)
POTASSIUM SERPL-MCNC: 3.5 MMOL/L — SIGNIFICANT CHANGE UP (ref 3.5–5.3)
POTASSIUM SERPL-SCNC: 3.5 MMOL/L — SIGNIFICANT CHANGE UP (ref 3.5–5.3)
PROT SERPL-MCNC: 6.9 GM/DL — SIGNIFICANT CHANGE UP (ref 6–8.3)
RBC # BLD: 3.89 M/UL — SIGNIFICANT CHANGE UP (ref 3.8–5.2)
RBC # FLD: 19.7 % — HIGH (ref 10.3–14.5)
SODIUM SERPL-SCNC: 139 MMOL/L — SIGNIFICANT CHANGE UP (ref 135–145)
WBC # BLD: 12.96 K/UL — HIGH (ref 3.8–10.5)
WBC # FLD AUTO: 12.96 K/UL — HIGH (ref 3.8–10.5)

## 2020-01-28 PROCEDURE — 85025 COMPLETE CBC W/AUTO DIFF WBC: CPT

## 2020-01-28 PROCEDURE — 36415 COLL VENOUS BLD VENIPUNCTURE: CPT

## 2020-01-28 PROCEDURE — 96417 CHEMO IV INFUS EACH ADDL SEQ: CPT

## 2020-01-28 PROCEDURE — 96413 CHEMO IV INFUSION 1 HR: CPT

## 2020-01-28 PROCEDURE — 96367 TX/PROPH/DG ADDL SEQ IV INF: CPT

## 2020-01-28 PROCEDURE — 96375 TX/PRO/DX INJ NEW DRUG ADDON: CPT

## 2020-01-28 PROCEDURE — 80053 COMPREHEN METABOLIC PANEL: CPT

## 2020-01-28 RX ORDER — PALONOSETRON HYDROCHLORIDE 0.25 MG/5ML
0.25 INJECTION, SOLUTION INTRAVENOUS ONCE
Refills: 0 | Status: COMPLETED | OUTPATIENT
Start: 2020-01-28 | End: 2020-01-28

## 2020-01-28 RX ORDER — CYCLOPHOSPHAMIDE 100 MG
1260 VIAL (EA) INTRAVENOUS ONCE
Refills: 0 | Status: COMPLETED | OUTPATIENT
Start: 2020-01-28 | End: 2020-01-28

## 2020-01-28 RX ORDER — FOSAPREPITANT DIMEGLUMINE 150 MG/5ML
150 INJECTION, POWDER, LYOPHILIZED, FOR SOLUTION INTRAVENOUS ONCE
Refills: 0 | Status: COMPLETED | OUTPATIENT
Start: 2020-01-28 | End: 2020-01-28

## 2020-01-28 RX ORDER — DOCETAXEL 20 MG/ML
158 INJECTION, SOLUTION, CONCENTRATE INTRAVENOUS ONCE
Refills: 0 | Status: COMPLETED | OUTPATIENT
Start: 2020-01-28 | End: 2020-01-28

## 2020-01-28 RX ADMIN — FOSAPREPITANT DIMEGLUMINE 310 MILLIGRAM(S): 150 INJECTION, POWDER, LYOPHILIZED, FOR SOLUTION INTRAVENOUS at 15:13

## 2020-01-28 RX ADMIN — SODIUM CHLORIDE 30 MILLILITER(S): 9 INJECTION INTRAMUSCULAR; INTRAVENOUS; SUBCUTANEOUS at 14:53

## 2020-01-28 RX ADMIN — PALONOSETRON HYDROCHLORIDE 0.25 MILLIGRAM(S): 0.25 INJECTION, SOLUTION INTRAVENOUS at 15:10

## 2020-01-28 RX ADMIN — Medication 313 MILLIGRAM(S): at 16:56

## 2020-01-28 RX ADMIN — DOCETAXEL 257.9 MILLIGRAM(S): 20 INJECTION, SOLUTION, CONCENTRATE INTRAVENOUS at 15:44

## 2020-01-29 DIAGNOSIS — R11.2 NAUSEA WITH VOMITING, UNSPECIFIED: ICD-10-CM

## 2020-01-29 DIAGNOSIS — Z51.11 ENCOUNTER FOR ANTINEOPLASTIC CHEMOTHERAPY: ICD-10-CM

## 2020-01-31 ENCOUNTER — OUTPATIENT (OUTPATIENT)
Dept: OUTPATIENT SERVICES | Facility: HOSPITAL | Age: 55
LOS: 1 days | End: 2020-01-31
Payer: COMMERCIAL

## 2020-01-31 VITALS — DIASTOLIC BLOOD PRESSURE: 61 MMHG | HEART RATE: 94 BPM | SYSTOLIC BLOOD PRESSURE: 92 MMHG | TEMPERATURE: 98 F

## 2020-01-31 DIAGNOSIS — Z98.82 BREAST IMPLANT STATUS: Chronic | ICD-10-CM

## 2020-01-31 DIAGNOSIS — Z98.890 OTHER SPECIFIED POSTPROCEDURAL STATES: Chronic | ICD-10-CM

## 2020-01-31 DIAGNOSIS — C50.512 MALIGNANT NEOPLASM OF LOWER-OUTER QUADRANT OF LEFT FEMALE BREAST: ICD-10-CM

## 2020-01-31 DIAGNOSIS — Z90.11 ACQUIRED ABSENCE OF RIGHT BREAST AND NIPPLE: Chronic | ICD-10-CM

## 2020-01-31 DIAGNOSIS — R69 ILLNESS, UNSPECIFIED: ICD-10-CM

## 2020-01-31 PROCEDURE — 96372 THER/PROPH/DIAG INJ SC/IM: CPT

## 2020-01-31 PROCEDURE — 96360 HYDRATION IV INFUSION INIT: CPT

## 2020-01-31 RX ORDER — PEGFILGRASTIM-CBQV 6 MG/.6ML
6 INJECTION, SOLUTION SUBCUTANEOUS ONCE
Refills: 0 | Status: COMPLETED | OUTPATIENT
Start: 2020-01-31 | End: 2020-01-31

## 2020-01-31 RX ORDER — SODIUM CHLORIDE 9 MG/ML
1000 INJECTION INTRAMUSCULAR; INTRAVENOUS; SUBCUTANEOUS
Refills: 0 | Status: COMPLETED | OUTPATIENT
Start: 2020-01-31 | End: 2020-01-31

## 2020-01-31 RX ADMIN — SODIUM CHLORIDE 500 MILLILITER(S): 9 INJECTION INTRAMUSCULAR; INTRAVENOUS; SUBCUTANEOUS at 10:38

## 2020-01-31 RX ADMIN — SODIUM CHLORIDE 10 MILLILITER(S): 9 INJECTION INTRAMUSCULAR; INTRAVENOUS; SUBCUTANEOUS at 12:39

## 2020-01-31 RX ADMIN — PEGFILGRASTIM-CBQV 6 MILLIGRAM(S): 6 INJECTION, SOLUTION SUBCUTANEOUS at 12:33

## 2020-01-31 RX ADMIN — SODIUM CHLORIDE 1000 MILLILITER(S): 9 INJECTION INTRAMUSCULAR; INTRAVENOUS; SUBCUTANEOUS at 12:38

## 2020-01-31 RX ADMIN — Medication 500 UNIT(S): at 12:39

## 2020-02-04 DIAGNOSIS — D70.1 AGRANULOCYTOSIS SECONDARY TO CANCER CHEMOTHERAPY: ICD-10-CM

## 2020-02-04 DIAGNOSIS — E86.0 DEHYDRATION: ICD-10-CM

## 2020-02-18 ENCOUNTER — OUTPATIENT (OUTPATIENT)
Dept: OUTPATIENT SERVICES | Facility: HOSPITAL | Age: 55
LOS: 1 days | End: 2020-02-18
Payer: COMMERCIAL

## 2020-02-18 VITALS
HEART RATE: 106 BPM | HEIGHT: 69 IN | DIASTOLIC BLOOD PRESSURE: 72 MMHG | RESPIRATION RATE: 16 BRPM | SYSTOLIC BLOOD PRESSURE: 120 MMHG | TEMPERATURE: 98 F | WEIGHT: 210.1 LBS

## 2020-02-18 VITALS — DIASTOLIC BLOOD PRESSURE: 79 MMHG | HEART RATE: 84 BPM | SYSTOLIC BLOOD PRESSURE: 121 MMHG

## 2020-02-18 DIAGNOSIS — Z98.890 OTHER SPECIFIED POSTPROCEDURAL STATES: Chronic | ICD-10-CM

## 2020-02-18 DIAGNOSIS — Z90.11 ACQUIRED ABSENCE OF RIGHT BREAST AND NIPPLE: Chronic | ICD-10-CM

## 2020-02-18 DIAGNOSIS — Z98.82 BREAST IMPLANT STATUS: Chronic | ICD-10-CM

## 2020-02-18 DIAGNOSIS — R69 ILLNESS, UNSPECIFIED: ICD-10-CM

## 2020-02-18 LAB
ALBUMIN SERPL ELPH-MCNC: 3.4 G/DL — SIGNIFICANT CHANGE UP (ref 3.3–5)
ALP SERPL-CCNC: 48 U/L — SIGNIFICANT CHANGE UP (ref 40–120)
ALT FLD-CCNC: 20 U/L — SIGNIFICANT CHANGE UP (ref 12–78)
ANION GAP SERPL CALC-SCNC: 6 MMOL/L — SIGNIFICANT CHANGE UP (ref 5–17)
AST SERPL-CCNC: 11 U/L — LOW (ref 15–37)
BASOPHILS # BLD AUTO: 0.01 K/UL — SIGNIFICANT CHANGE UP (ref 0–0.2)
BASOPHILS NFR BLD AUTO: 0.1 % — SIGNIFICANT CHANGE UP (ref 0–2)
BILIRUB SERPL-MCNC: 0.3 MG/DL — SIGNIFICANT CHANGE UP (ref 0.2–1.2)
BUN SERPL-MCNC: 14 MG/DL — SIGNIFICANT CHANGE UP (ref 7–23)
CALCIUM SERPL-MCNC: 9 MG/DL — SIGNIFICANT CHANGE UP (ref 8.5–10.1)
CHLORIDE SERPL-SCNC: 111 MMOL/L — HIGH (ref 96–108)
CO2 SERPL-SCNC: 24 MMOL/L — SIGNIFICANT CHANGE UP (ref 22–31)
CREAT SERPL-MCNC: 0.75 MG/DL — SIGNIFICANT CHANGE UP (ref 0.5–1.3)
EOSINOPHIL # BLD AUTO: 0 K/UL — SIGNIFICANT CHANGE UP (ref 0–0.5)
EOSINOPHIL NFR BLD AUTO: 0 % — SIGNIFICANT CHANGE UP (ref 0–6)
GLUCOSE SERPL-MCNC: 126 MG/DL — HIGH (ref 70–99)
HCT VFR BLD CALC: 34.3 % — LOW (ref 34.5–45)
HGB BLD-MCNC: 11 G/DL — LOW (ref 11.5–15.5)
IMM GRANULOCYTES NFR BLD AUTO: 0.5 % — SIGNIFICANT CHANGE UP (ref 0–1.5)
LYMPHOCYTES # BLD AUTO: 0.58 K/UL — LOW (ref 1–3.3)
LYMPHOCYTES # BLD AUTO: 4.3 % — LOW (ref 13–44)
MCHC RBC-ENTMCNC: 29.3 PG — SIGNIFICANT CHANGE UP (ref 27–34)
MCHC RBC-ENTMCNC: 32.1 GM/DL — SIGNIFICANT CHANGE UP (ref 32–36)
MCV RBC AUTO: 91.5 FL — SIGNIFICANT CHANGE UP (ref 80–100)
MONOCYTES # BLD AUTO: 0.26 K/UL — SIGNIFICANT CHANGE UP (ref 0–0.9)
MONOCYTES NFR BLD AUTO: 1.9 % — LOW (ref 2–14)
NEUTROPHILS # BLD AUTO: 12.62 K/UL — HIGH (ref 1.8–7.4)
NEUTROPHILS NFR BLD AUTO: 93.2 % — HIGH (ref 43–77)
PLATELET # BLD AUTO: 444 K/UL — HIGH (ref 150–400)
POTASSIUM SERPL-MCNC: 3.9 MMOL/L — SIGNIFICANT CHANGE UP (ref 3.5–5.3)
POTASSIUM SERPL-SCNC: 3.9 MMOL/L — SIGNIFICANT CHANGE UP (ref 3.5–5.3)
PROT SERPL-MCNC: 6.8 GM/DL — SIGNIFICANT CHANGE UP (ref 6–8.3)
RBC # BLD: 3.75 M/UL — LOW (ref 3.8–5.2)
RBC # FLD: 19.9 % — HIGH (ref 10.3–14.5)
SODIUM SERPL-SCNC: 141 MMOL/L — SIGNIFICANT CHANGE UP (ref 135–145)
WBC # BLD: 13.54 K/UL — HIGH (ref 3.8–10.5)
WBC # FLD AUTO: 13.54 K/UL — HIGH (ref 3.8–10.5)

## 2020-02-18 PROCEDURE — 96375 TX/PRO/DX INJ NEW DRUG ADDON: CPT

## 2020-02-18 PROCEDURE — 36415 COLL VENOUS BLD VENIPUNCTURE: CPT

## 2020-02-18 PROCEDURE — 96367 TX/PROPH/DG ADDL SEQ IV INF: CPT

## 2020-02-18 PROCEDURE — 96413 CHEMO IV INFUSION 1 HR: CPT

## 2020-02-18 PROCEDURE — 85025 COMPLETE CBC W/AUTO DIFF WBC: CPT

## 2020-02-18 PROCEDURE — 96417 CHEMO IV INFUS EACH ADDL SEQ: CPT

## 2020-02-18 PROCEDURE — 80053 COMPREHEN METABOLIC PANEL: CPT

## 2020-02-18 RX ORDER — DOCETAXEL 20 MG/ML
158 INJECTION, SOLUTION, CONCENTRATE INTRAVENOUS ONCE
Refills: 0 | Status: COMPLETED | OUTPATIENT
Start: 2020-02-18 | End: 2020-02-18

## 2020-02-18 RX ORDER — PALONOSETRON HYDROCHLORIDE 0.25 MG/5ML
0.25 INJECTION, SOLUTION INTRAVENOUS ONCE
Refills: 0 | Status: COMPLETED | OUTPATIENT
Start: 2020-02-18 | End: 2020-02-18

## 2020-02-18 RX ORDER — SODIUM CHLORIDE 9 MG/ML
10 INJECTION INTRAMUSCULAR; INTRAVENOUS; SUBCUTANEOUS ONCE
Refills: 0 | Status: COMPLETED | OUTPATIENT
Start: 2020-02-18 | End: 2020-02-18

## 2020-02-18 RX ORDER — SODIUM CHLORIDE 9 MG/ML
250 INJECTION INTRAMUSCULAR; INTRAVENOUS; SUBCUTANEOUS
Refills: 0 | Status: DISCONTINUED | OUTPATIENT
Start: 2020-02-18 | End: 2020-05-20

## 2020-02-18 RX ORDER — CYCLOPHOSPHAMIDE 100 MG
1260 VIAL (EA) INTRAVENOUS ONCE
Refills: 0 | Status: COMPLETED | OUTPATIENT
Start: 2020-02-18 | End: 2020-02-18

## 2020-02-18 RX ORDER — FOSAPREPITANT DIMEGLUMINE 150 MG/5ML
150 INJECTION, POWDER, LYOPHILIZED, FOR SOLUTION INTRAVENOUS ONCE
Refills: 0 | Status: COMPLETED | OUTPATIENT
Start: 2020-02-18 | End: 2020-02-18

## 2020-02-18 RX ADMIN — Medication 1260 MILLIGRAM(S): at 18:22

## 2020-02-18 RX ADMIN — DOCETAXEL 257.9 MILLIGRAM(S): 20 INJECTION, SOLUTION, CONCENTRATE INTRAVENOUS at 16:00

## 2020-02-18 RX ADMIN — SODIUM CHLORIDE 10 MILLILITER(S): 9 INJECTION INTRAMUSCULAR; INTRAVENOUS; SUBCUTANEOUS at 18:34

## 2020-02-18 RX ADMIN — Medication 313 MILLIGRAM(S): at 17:14

## 2020-02-18 RX ADMIN — FOSAPREPITANT DIMEGLUMINE 310 MILLIGRAM(S): 150 INJECTION, POWDER, LYOPHILIZED, FOR SOLUTION INTRAVENOUS at 15:34

## 2020-02-18 RX ADMIN — SODIUM CHLORIDE 250 MILLILITER(S): 9 INJECTION INTRAMUSCULAR; INTRAVENOUS; SUBCUTANEOUS at 18:33

## 2020-02-18 RX ADMIN — FOSAPREPITANT DIMEGLUMINE 150 MILLIGRAM(S): 150 INJECTION, POWDER, LYOPHILIZED, FOR SOLUTION INTRAVENOUS at 15:55

## 2020-02-18 RX ADMIN — SODIUM CHLORIDE 30 MILLILITER(S): 9 INJECTION INTRAMUSCULAR; INTRAVENOUS; SUBCUTANEOUS at 14:50

## 2020-02-18 RX ADMIN — Medication 500 UNIT(S): at 18:34

## 2020-02-18 RX ADMIN — PALONOSETRON HYDROCHLORIDE 0.25 MILLIGRAM(S): 0.25 INJECTION, SOLUTION INTRAVENOUS at 15:33

## 2020-02-18 RX ADMIN — DOCETAXEL 158 MILLIGRAM(S): 20 INJECTION, SOLUTION, CONCENTRATE INTRAVENOUS at 17:05

## 2020-02-19 DIAGNOSIS — C50.512 MALIGNANT NEOPLASM OF LOWER-OUTER QUADRANT OF LEFT FEMALE BREAST: ICD-10-CM

## 2020-02-19 DIAGNOSIS — Z51.11 ENCOUNTER FOR ANTINEOPLASTIC CHEMOTHERAPY: ICD-10-CM

## 2020-02-19 DIAGNOSIS — R11.2 NAUSEA WITH VOMITING, UNSPECIFIED: ICD-10-CM

## 2020-02-19 DIAGNOSIS — R69 ILLNESS, UNSPECIFIED: ICD-10-CM

## 2020-02-21 ENCOUNTER — OUTPATIENT (OUTPATIENT)
Dept: OUTPATIENT SERVICES | Facility: HOSPITAL | Age: 55
LOS: 1 days | Discharge: HOME | End: 2020-02-21

## 2020-02-21 ENCOUNTER — EMERGENCY (EMERGENCY)
Facility: HOSPITAL | Age: 55
LOS: 0 days | Discharge: HOME | End: 2020-02-22
Attending: FAMILY MEDICINE
Payer: COMMERCIAL

## 2020-02-21 ENCOUNTER — OUTPATIENT (OUTPATIENT)
Dept: OUTPATIENT SERVICES | Facility: HOSPITAL | Age: 55
LOS: 1 days | End: 2020-02-21
Payer: COMMERCIAL

## 2020-02-21 VITALS
HEART RATE: 92 BPM | RESPIRATION RATE: 20 BRPM | DIASTOLIC BLOOD PRESSURE: 71 MMHG | OXYGEN SATURATION: 96 % | TEMPERATURE: 99 F | SYSTOLIC BLOOD PRESSURE: 128 MMHG

## 2020-02-21 VITALS
TEMPERATURE: 98 F | RESPIRATION RATE: 16 BRPM | HEART RATE: 109 BPM | DIASTOLIC BLOOD PRESSURE: 65 MMHG | HEIGHT: 69 IN | WEIGHT: 205.47 LBS | SYSTOLIC BLOOD PRESSURE: 103 MMHG

## 2020-02-21 VITALS — WEIGHT: 205.03 LBS | HEIGHT: 69 IN

## 2020-02-21 DIAGNOSIS — Z98.890 OTHER SPECIFIED POSTPROCEDURAL STATES: Chronic | ICD-10-CM

## 2020-02-21 DIAGNOSIS — Z90.11 ACQUIRED ABSENCE OF RIGHT BREAST AND NIPPLE: ICD-10-CM

## 2020-02-21 DIAGNOSIS — Z98.82 BREAST IMPLANT STATUS: Chronic | ICD-10-CM

## 2020-02-21 DIAGNOSIS — C50.912 MALIGNANT NEOPLASM OF UNSPECIFIED SITE OF LEFT FEMALE BREAST: ICD-10-CM

## 2020-02-21 DIAGNOSIS — C50.512 MALIGNANT NEOPLASM OF LOWER-OUTER QUADRANT OF LEFT FEMALE BREAST: ICD-10-CM

## 2020-02-21 DIAGNOSIS — R00.0 TACHYCARDIA, UNSPECIFIED: ICD-10-CM

## 2020-02-21 DIAGNOSIS — R69 ILLNESS, UNSPECIFIED: ICD-10-CM

## 2020-02-21 DIAGNOSIS — Z90.11 ACQUIRED ABSENCE OF RIGHT BREAST AND NIPPLE: Chronic | ICD-10-CM

## 2020-02-21 DIAGNOSIS — Z92.21 PERSONAL HISTORY OF ANTINEOPLASTIC CHEMOTHERAPY: ICD-10-CM

## 2020-02-21 DIAGNOSIS — K44.9 DIAPHRAGMATIC HERNIA WITHOUT OBSTRUCTION OR GANGRENE: ICD-10-CM

## 2020-02-21 DIAGNOSIS — N83.209 UNSPECIFIED OVARIAN CYST, UNSPECIFIED SIDE: ICD-10-CM

## 2020-02-21 DIAGNOSIS — Z85.3 PERSONAL HISTORY OF MALIGNANT NEOPLASM OF BREAST: ICD-10-CM

## 2020-02-21 DIAGNOSIS — I80.9 PHLEBITIS AND THROMBOPHLEBITIS OF UNSPECIFIED SITE: ICD-10-CM

## 2020-02-21 DIAGNOSIS — N80.9 ENDOMETRIOSIS, UNSPECIFIED: ICD-10-CM

## 2020-02-21 DIAGNOSIS — J10.1 INFLUENZA DUE TO OTHER IDENTIFIED INFLUENZA VIRUS WITH OTHER RESPIRATORY MANIFESTATIONS: ICD-10-CM

## 2020-02-21 DIAGNOSIS — R05 COUGH: ICD-10-CM

## 2020-02-21 DIAGNOSIS — Z79.01 LONG TERM (CURRENT) USE OF ANTICOAGULANTS: ICD-10-CM

## 2020-02-21 DIAGNOSIS — R50.9 FEVER, UNSPECIFIED: ICD-10-CM

## 2020-02-21 DIAGNOSIS — Z17.0 ESTROGEN RECEPTOR POSITIVE STATUS [ER+]: ICD-10-CM

## 2020-02-21 LAB
ALBUMIN SERPL ELPH-MCNC: 2.9 G/DL — LOW (ref 3.3–5)
ALP SERPL-CCNC: 39 U/L — LOW (ref 40–120)
ALT FLD-CCNC: 15 U/L — SIGNIFICANT CHANGE UP (ref 12–78)
ANION GAP SERPL CALC-SCNC: 6 MMOL/L — SIGNIFICANT CHANGE UP (ref 5–17)
APPEARANCE UR: CLEAR — SIGNIFICANT CHANGE UP
APTT BLD: 28.4 SEC — SIGNIFICANT CHANGE UP (ref 27.5–36.3)
AST SERPL-CCNC: 15 U/L — SIGNIFICANT CHANGE UP (ref 15–37)
BASOPHILS # BLD AUTO: 0 K/UL — SIGNIFICANT CHANGE UP (ref 0–0.2)
BASOPHILS NFR BLD AUTO: 0 % — SIGNIFICANT CHANGE UP (ref 0–2)
BILIRUB SERPL-MCNC: 0.3 MG/DL — SIGNIFICANT CHANGE UP (ref 0.2–1.2)
BILIRUB UR-MCNC: NEGATIVE — SIGNIFICANT CHANGE UP
BUN SERPL-MCNC: 13 MG/DL — SIGNIFICANT CHANGE UP (ref 7–23)
CALCIUM SERPL-MCNC: 7.7 MG/DL — LOW (ref 8.5–10.1)
CHLORIDE SERPL-SCNC: 108 MMOL/L — SIGNIFICANT CHANGE UP (ref 96–108)
CO2 SERPL-SCNC: 25 MMOL/L — SIGNIFICANT CHANGE UP (ref 22–31)
COLOR SPEC: YELLOW — SIGNIFICANT CHANGE UP
CREAT SERPL-MCNC: 0.65 MG/DL — SIGNIFICANT CHANGE UP (ref 0.5–1.3)
DIFF PNL FLD: NEGATIVE — SIGNIFICANT CHANGE UP
EOSINOPHIL # BLD AUTO: 0 K/UL — SIGNIFICANT CHANGE UP (ref 0–0.5)
EOSINOPHIL NFR BLD AUTO: 0 % — SIGNIFICANT CHANGE UP (ref 0–6)
FLU A RESULT: DETECTED
FLU A RESULT: DETECTED
FLUAV AG NPH QL: DETECTED
FLUBV AG NPH QL: SIGNIFICANT CHANGE UP
GLUCOSE SERPL-MCNC: 101 MG/DL — HIGH (ref 70–99)
GLUCOSE UR QL: NEGATIVE MG/DL — SIGNIFICANT CHANGE UP
HCT VFR BLD CALC: 32.3 % — LOW (ref 34.5–45)
HGB BLD-MCNC: 10.4 G/DL — LOW (ref 11.5–15.5)
INR BLD: 1.09 RATIO — SIGNIFICANT CHANGE UP (ref 0.88–1.16)
KETONES UR-MCNC: ABNORMAL
LACTATE SERPL-SCNC: 1.1 MMOL/L — SIGNIFICANT CHANGE UP (ref 0.7–2)
LEUKOCYTE ESTERASE UR-ACNC: NEGATIVE — SIGNIFICANT CHANGE UP
LYMPHOCYTES # BLD AUTO: 0.35 K/UL — LOW (ref 1–3.3)
LYMPHOCYTES # BLD AUTO: 14 % — SIGNIFICANT CHANGE UP (ref 13–44)
MCHC RBC-ENTMCNC: 29.4 PG — SIGNIFICANT CHANGE UP (ref 27–34)
MCHC RBC-ENTMCNC: 32.2 GM/DL — SIGNIFICANT CHANGE UP (ref 32–36)
MCV RBC AUTO: 91.2 FL — SIGNIFICANT CHANGE UP (ref 80–100)
MONOCYTES # BLD AUTO: 0.02 K/UL — SIGNIFICANT CHANGE UP (ref 0–0.9)
MONOCYTES NFR BLD AUTO: 1 % — LOW (ref 2–14)
NEUTROPHILS # BLD AUTO: 2.1 K/UL — SIGNIFICANT CHANGE UP (ref 1.8–7.4)
NEUTROPHILS NFR BLD AUTO: 85 % — HIGH (ref 43–77)
NITRITE UR-MCNC: NEGATIVE — SIGNIFICANT CHANGE UP
NRBC # BLD: SIGNIFICANT CHANGE UP /100 WBCS (ref 0–0)
PH UR: 5 — SIGNIFICANT CHANGE UP (ref 5–8)
PLATELET # BLD AUTO: 274 K/UL — SIGNIFICANT CHANGE UP (ref 150–400)
POTASSIUM SERPL-MCNC: 3.3 MMOL/L — LOW (ref 3.5–5.3)
POTASSIUM SERPL-SCNC: 3.3 MMOL/L — LOW (ref 3.5–5.3)
PROT SERPL-MCNC: 5.8 GM/DL — LOW (ref 6–8.3)
PROT UR-MCNC: 15 MG/DL
PROTHROM AB SERPL-ACNC: 12.1 SEC — SIGNIFICANT CHANGE UP (ref 10–12.9)
RBC # BLD: 3.54 M/UL — LOW (ref 3.8–5.2)
RBC # FLD: 19.2 % — HIGH (ref 10.3–14.5)
RSV RESULT: SIGNIFICANT CHANGE UP
RSV RNA RESP QL NAA+PROBE: SIGNIFICANT CHANGE UP
SODIUM SERPL-SCNC: 139 MMOL/L — SIGNIFICANT CHANGE UP (ref 135–145)
SP GR SPEC: 1.02 — SIGNIFICANT CHANGE UP (ref 1.01–1.02)
UROBILINOGEN FLD QL: 1 MG/DL
WBC # BLD: 2.47 K/UL — LOW (ref 3.8–10.5)
WBC # FLD AUTO: 2.47 K/UL — LOW (ref 3.8–10.5)

## 2020-02-21 PROCEDURE — 80053 COMPREHEN METABOLIC PANEL: CPT

## 2020-02-21 PROCEDURE — 71045 X-RAY EXAM CHEST 1 VIEW: CPT

## 2020-02-21 PROCEDURE — 99285 EMERGENCY DEPT VISIT HI MDM: CPT

## 2020-02-21 PROCEDURE — 71275 CT ANGIOGRAPHY CHEST: CPT | Mod: 26

## 2020-02-21 PROCEDURE — 99284 EMERGENCY DEPT VISIT MOD MDM: CPT | Mod: 25

## 2020-02-21 PROCEDURE — 87631 RESP VIRUS 3-5 TARGETS: CPT

## 2020-02-21 PROCEDURE — 87086 URINE CULTURE/COLONY COUNT: CPT

## 2020-02-21 PROCEDURE — 85610 PROTHROMBIN TIME: CPT

## 2020-02-21 PROCEDURE — 96361 HYDRATE IV INFUSION ADD-ON: CPT

## 2020-02-21 PROCEDURE — 85730 THROMBOPLASTIN TIME PARTIAL: CPT

## 2020-02-21 PROCEDURE — 93005 ELECTROCARDIOGRAM TRACING: CPT

## 2020-02-21 PROCEDURE — 71045 X-RAY EXAM CHEST 1 VIEW: CPT | Mod: 26

## 2020-02-21 PROCEDURE — 83605 ASSAY OF LACTIC ACID: CPT

## 2020-02-21 PROCEDURE — 85025 COMPLETE CBC W/AUTO DIFF WBC: CPT

## 2020-02-21 PROCEDURE — 96360 HYDRATION IV INFUSION INIT: CPT

## 2020-02-21 PROCEDURE — 71275 CT ANGIOGRAPHY CHEST: CPT

## 2020-02-21 PROCEDURE — 81001 URINALYSIS AUTO W/SCOPE: CPT

## 2020-02-21 PROCEDURE — 87040 BLOOD CULTURE FOR BACTERIA: CPT

## 2020-02-21 PROCEDURE — 36415 COLL VENOUS BLD VENIPUNCTURE: CPT

## 2020-02-21 PROCEDURE — 96360 HYDRATION IV INFUSION INIT: CPT | Mod: XU

## 2020-02-21 PROCEDURE — 93010 ELECTROCARDIOGRAM REPORT: CPT

## 2020-02-21 RX ORDER — SODIUM CHLORIDE 9 MG/ML
1000 INJECTION INTRAMUSCULAR; INTRAVENOUS; SUBCUTANEOUS
Refills: 0 | Status: DISCONTINUED | OUTPATIENT
Start: 2020-02-21 | End: 2020-05-23

## 2020-02-21 RX ORDER — SODIUM CHLORIDE 9 MG/ML
2900 INJECTION, SOLUTION INTRAVENOUS ONCE
Refills: 0 | Status: COMPLETED | OUTPATIENT
Start: 2020-02-21 | End: 2020-02-21

## 2020-02-21 RX ADMIN — SODIUM CHLORIDE 2900 MILLILITER(S): 9 INJECTION, SOLUTION INTRAVENOUS at 20:53

## 2020-02-21 RX ADMIN — SODIUM CHLORIDE 1000 MILLILITER(S): 9 INJECTION INTRAMUSCULAR; INTRAVENOUS; SUBCUTANEOUS at 13:30

## 2020-02-21 RX ADMIN — SODIUM CHLORIDE 500 MILLILITER(S): 9 INJECTION INTRAMUSCULAR; INTRAVENOUS; SUBCUTANEOUS at 11:41

## 2020-02-21 NOTE — ED ADULT TRIAGE NOTE - CHIEF COMPLAINT QUOTE
Pt has hx of breast ca stage IIb, finished chemo on tuesday ( four days ago) and has developed fever

## 2020-02-21 NOTE — ED ADULT NURSE NOTE - OBJECTIVE STATEMENT
PT TO ED AFTER HYDRATION TODAY DUE TO HAVING FEVER UPON RETURNING HOME. LAST CHEMO WAS THIS PAST TUESDAY. PT COMPLAINS OF COUGH AND OCCASSIONAL SOB, ALONG W/ WEAKNESS. PT A&OX4, AMBULATORY. MILD PAIN2/10, GENERALIZED. 02 SAT 96% ON ROOM AIR. VSS. PT NSR ON MONITOR, HR 90'S. PT STATES FEVER AT HOME .4, AND TOOK 3 TYLENOL. NO FEVER NOTED IN ED. PT CALM AND COOPERATIVE. LABS SENT. PT PENDING RVP RESULTS.

## 2020-02-21 NOTE — ED PROVIDER NOTE - PROGRESS NOTE DETAILS
Patient seen and evaluated.  Last dose of chemo was on Tuesday, she developed fever, chills and cough today.  Came in for concern of fever.  Took Tylenol PTA.  Will do septic workup, suspect viral URI.  She did not get a dose of neulasta after last chemo because she was not neurtopenic -Janine Garcia PA-C Pt is hesitant to go home as her brother was told here he was all right to go home then he  of sepsis. I explanted that she has the flu. I explained that the ct is negative for infiltrate and she is not sob and not hypoxic. I spoke to the covering doctor for Dr. Cheo MCKEON. She is in agreement that patient can be discharged with close follow up. I relayed this to patient and she feels more comfortable going home and agrees to f/u or rt er if worse. John CASTILLO

## 2020-02-21 NOTE — ED PROVIDER NOTE - NSFOLLOWUPINSTRUCTIONS_ED_ALL_ED_FT
Take Tamiflu 75mg twice daily, Drink lots of fluids. Follow up with your medical doctor this week. Increase potassium in your diet. Return to ER if worse.

## 2020-02-21 NOTE — ED PROVIDER NOTE - PATIENT PORTAL LINK FT
You can access the FollowMyHealth Patient Portal offered by [facility] by registering at the following website: [email or web address]. By joining [employer]’s FollowMyHealth portal, you will also be able to view your health information using other applications (apps) compatible with our system.

## 2020-02-21 NOTE — ED PROVIDER NOTE - OBJECTIVE STATEMENT
53 y/o female with a PMHx of phlebitis, breast ca, ovarian cyst, endometriosis presents to ED c/o fever, chills and cough. +Chills and cough x2 days ago, fever developed last night. Pt finished chemo x4 days ago for breast cancer stage IIb, states she felt fine after. Pt was in HH[facility] at [facility]. Took tylenol PTA. Pt didn't receive her white count shot this week and developed fever and chills. Pt nonsmoker, secondary smoke from . [employer]. PMD: Dr. Wall. 55 y/o female with a PMHx of phlebitis, breast ca, ovarian cyst, endometriosis presents to ED c/o fever, chills and cough. +Chills and cough x2 days ago, fever developed last night. Pt finished chemo x4 days ago for breast cancer stage IIb, states she felt fine after. Pt was in HHED at [facility] for saline. Took tylenol PTA. Pt didn't receive her white count shot this week and developed fever and chills. Pt nonsmoker, secondary smoke from . NKDA. PMD: Dr. Wall.

## 2020-02-22 LAB
CULTURE RESULTS: SIGNIFICANT CHANGE UP
SPECIMEN SOURCE: SIGNIFICANT CHANGE UP

## 2020-02-22 RX ORDER — POTASSIUM CHLORIDE 20 MEQ
40 PACKET (EA) ORAL ONCE
Refills: 0 | Status: COMPLETED | OUTPATIENT
Start: 2020-02-22 | End: 2020-02-22

## 2020-02-22 RX ORDER — ACETAMINOPHEN 500 MG
650 TABLET ORAL ONCE
Refills: 0 | Status: DISCONTINUED | OUTPATIENT
Start: 2020-02-22 | End: 2020-02-22

## 2020-02-22 RX ADMIN — SODIUM CHLORIDE 2900 MILLILITER(S): 9 INJECTION, SOLUTION INTRAVENOUS at 00:56

## 2020-02-22 RX ADMIN — Medication 75 MILLIGRAM(S): at 00:28

## 2020-02-22 RX ADMIN — Medication 40 MILLIEQUIVALENT(S): at 00:55

## 2020-02-24 DIAGNOSIS — E86.0 DEHYDRATION: ICD-10-CM

## 2020-02-27 LAB
CULTURE RESULTS: SIGNIFICANT CHANGE UP
CULTURE RESULTS: SIGNIFICANT CHANGE UP
SPECIMEN SOURCE: SIGNIFICANT CHANGE UP
SPECIMEN SOURCE: SIGNIFICANT CHANGE UP

## 2020-03-05 ENCOUNTER — APPOINTMENT (OUTPATIENT)
Age: 55
End: 2020-03-05
Payer: COMMERCIAL

## 2020-03-05 VITALS
BODY MASS INDEX: 30.16 KG/M2 | HEART RATE: 80 BPM | RESPIRATION RATE: 14 BRPM | WEIGHT: 203.6 LBS | HEIGHT: 69 IN | DIASTOLIC BLOOD PRESSURE: 74 MMHG | TEMPERATURE: 98 F | SYSTOLIC BLOOD PRESSURE: 120 MMHG

## 2020-03-05 DIAGNOSIS — I80.8 PHLEBITIS AND THROMBOPHLEBITIS OF OTHER SITES: ICD-10-CM

## 2020-03-05 PROCEDURE — 99215 OFFICE O/P EST HI 40 MIN: CPT

## 2020-03-05 RX ORDER — DEXAMETHASONE 4 MG/1
4 TABLET ORAL
Qty: 72 | Refills: 0 | Status: DISCONTINUED | COMMUNITY
Start: 2019-10-29 | End: 2020-03-05

## 2020-03-05 NOTE — REVIEW OF SYSTEMS
[Skin Wound] : skin wound [Negative] : Neurological [de-identified] : transabdominal incision s/p DMITRI healing;  b/l DMITRI reconstructive surgery

## 2020-03-05 NOTE — ASSESSMENT
[FreeTextEntry1] : Ms. RODRIGUEZ 's questions were answered to her satisfaction. She  expressed her  understanding and willingness to comply with the above recommendations, and  will return to the office after completion RT.\par

## 2020-03-05 NOTE — RESULTS/DATA
[FreeTextEntry1] : I reviewed recent blood work results with patient.\par \par 2/18/20:\par WBC 13.54 K, hemoglobin 11 g/ dL, hematocrit  34.3 %, platelet  444,000\par \par 1/7/20:\par WBC 10.56 K, hemoglobin 11.5 g/ dL, hematocrit 35.6 %, platelet  504,000\par \par 11/5/19:\par WBC 14.62, hemoglobin 12.7 g/dL, hematocrit 39.1%, platelets 481 pounds\par \par 9/13/19: \par WBC 16.23, hemoglobin 10.7 g/dL, hematocrit 33%, platelet 343,000

## 2020-03-05 NOTE — HISTORY OF PRESENT ILLNESS
[Disease: _____________________] : Disease: [unfilled] [T: ___] : T[unfilled] [N: ___] : N[unfilled] [AJCC Stage: ____] : AJCC Stage: [unfilled] [de-identified] : 54 F with history right breast cancer at age 37, s/p right mastectomy with saline implant reconstruction, and nipple reconstruction with left nipple graft, s/p adjuvant chemotherapy x 6 months (Dr. Rene White), referred for medical oncologic consultation after diagnosis of left breast carcinoma in September 2019. \par \par CASE SYNOPSIS: \par 5/29/03- segmental resection right breast for moderately differentiated infiltrating ductal carcinoma; margin involved by infiltrating tumor.\par \par 7/10/03- right total mastectomy with saline implant reconstruction( Meghna Townsend/ Benny Grover). Received adjuvant systemic chemotherapy ( records to be obtained), under the care of Dr. Don White.\par \par 10/2018- reports lower left breast itching.\par \par 10/31/18- mammogram/ breast US: no suspicious lesions, no evidence of malignancy, intramammary lymph node 2:00 left breast.\par \par 6/24/19 MRI breast – right implant intact; left breast 5:00 N8, 2 adjacent masses 1, and 1.5 cm; abnormal clumped enhancement extending from masses medially, multiple additional masses throughout left breast: 6:00 N4 8 mm, 3:00 N6 1 cm, 2:00 N6 7 mm, 12:00 N6 9 mm, 10:00 N4 8 mm. Multiple small axillary lymph nodes.\par \par 6/25/19- DEXA scan: low bone mass based on left femoral neck T score (-1.2 SD from the mean).\par \par 7/9/19: left breast US and US-guided core biopsy; left axilla with sonographically normal lymph node. \par Left 4-5:00 N6 9 x 7 x 10 mm and irregular inferior mass 18 x 9 x 14 mm. Pathology: \par Left 4-5:00 superior- infiltrating ductal carcinoma, SBR 6/9, ER90%, IL 90%, Her 2 negative. \par Left 4-5:00 inferior - infiltrating ductal carcinoma, SBR 6/9, ER90%, IL 70%, Her 2 negative.\par \par 9/12/19: left mastectomy with sentinel node biopsy and removal of both implants with bilateral DMITRI flap reconstruction. \par Pathology: left breast with 2 foci of invasive ductal carcinoma 2.5 cm and 1.2 cm , SBR 6/9, with foci of DCIS in UOQ, + LVI and PNI, margins negative, 1 / 2 SLN positive ( 8 mm ; no extracapsular extension), 2 more negative non- sentinel LN,  right and left internal mammary nodes negative.(pT2, pN1, Mx = stage IIB)\par \par 10/1/19- develops venous thrombosis in the basilic vein, superficial, extended for at least 10 cm.  LYMPHA procedure postponed. Started on anticoagulation.\par \par 10/2/19: PET/CT consistent with FDG avidity associated with the reconstructed bilateral breast in the anterior abdominal wall; no FDG avid local regional or distant metastatic disease.\par \par 10/17/19 – chemo- education session.\par \par 11/5/19- 2/18/20- completes 6 cycles Docetaxel/ Cyclophosphamide. [de-identified] : infiltrating ductal carcinoma [de-identified] : ER 90%, RI 90%, Her 2 negative [FreeTextEntry1] : docetaxel/ cyclophosphamide [de-identified] : Returning for oncologic follow up. Ms. RODRIGUEZ has completed 6 cycles Docetaxel/ Cyclophosphamide on 2/18/20. Presenting minimal side effects of which most pronounced are fatigue, arthralgia, etc,  Laboratory tests consistent with normal CBC; pending serum tumor marker (CA 27-29). In interim she was diagnosed with the flu on 2/21/20; started on Augmentin later due to fever. Developed rash on posterior torso within days after antibiotic started. Did not start the aromatase inhibitors (Anastrozole) yet. Medications reviewed; continues on anticoagulant for superficial vein thrombosis of upper extremity; reports no new VTEs. Has appointment with Dr. Torrez on 3/10/20.

## 2020-03-05 NOTE — PHYSICAL EXAM
[Fully active, able to carry on all pre-disease performance without restriction] : Status 0 - Fully active, able to carry on all pre-disease performance without restriction [Normal] : full range of motion and no deformities appreciated [de-identified] : bilateral mastectomy ; bilateral DMITRI reconstruction, scars well healed. [de-identified] : transverse abdominal incision healing; right sided wound opening with minimal serosanguineous secretions; left sided drain catheter in place, draining small amount of serosanguineous fluid [de-identified] : alopecia; new rash on  posterior torso after antibiotic started

## 2020-03-12 ENCOUNTER — RESULT REVIEW (OUTPATIENT)
Age: 55
End: 2020-03-12

## 2020-03-12 ENCOUNTER — OUTPATIENT (OUTPATIENT)
Dept: OUTPATIENT SERVICES | Facility: HOSPITAL | Age: 55
LOS: 1 days | Discharge: HOME | End: 2020-03-12
Payer: COMMERCIAL

## 2020-03-12 DIAGNOSIS — C50.512 MALIGNANT NEOPLASM OF LOWER-OUTER QUADRANT OF LEFT FEMALE BREAST: ICD-10-CM

## 2020-03-12 DIAGNOSIS — Z90.11 ACQUIRED ABSENCE OF RIGHT BREAST AND NIPPLE: Chronic | ICD-10-CM

## 2020-03-12 DIAGNOSIS — Z98.890 OTHER SPECIFIED POSTPROCEDURAL STATES: Chronic | ICD-10-CM

## 2020-03-12 DIAGNOSIS — Z98.82 BREAST IMPLANT STATUS: Chronic | ICD-10-CM

## 2020-03-12 PROCEDURE — 36598 INJ W/FLUOR EVAL CV DEVICE: CPT

## 2020-03-12 RX ORDER — ONDANSETRON 4 MG/1
4 TABLET, ORALLY DISINTEGRATING ORAL
Qty: 16 | Refills: 1 | Status: DISCONTINUED | COMMUNITY
Start: 2019-11-19 | End: 2020-03-12

## 2020-03-12 RX ORDER — PROCHLORPERAZINE MALEATE 10 MG/1
10 TABLET ORAL EVERY 6 HOURS
Qty: 30 | Refills: 3 | Status: DISCONTINUED | COMMUNITY
Start: 2019-10-29 | End: 2020-03-12

## 2020-03-12 NOTE — BRIEF OPERATIVE NOTE - OPERATION/FINDINGS
Fibrin sheath at tip of RIJV port, all contrast flowed into central venous system, no blood return. Safe to use but would not expect blood return on aspiration.

## 2020-03-21 ENCOUNTER — TRANSCRIPTION ENCOUNTER (OUTPATIENT)
Age: 55
End: 2020-03-21

## 2020-03-30 ENCOUNTER — EMERGENCY (EMERGENCY)
Facility: HOSPITAL | Age: 55
LOS: 0 days | Discharge: HOME | End: 2020-03-30
Payer: COMMERCIAL

## 2020-03-30 VITALS
TEMPERATURE: 98 F | OXYGEN SATURATION: 97 % | RESPIRATION RATE: 15 BRPM | SYSTOLIC BLOOD PRESSURE: 125 MMHG | HEART RATE: 92 BPM | DIASTOLIC BLOOD PRESSURE: 68 MMHG

## 2020-03-30 DIAGNOSIS — R05 COUGH: ICD-10-CM

## 2020-03-30 DIAGNOSIS — M79.10 MYALGIA, UNSPECIFIED SITE: ICD-10-CM

## 2020-03-30 DIAGNOSIS — Z90.11 ACQUIRED ABSENCE OF RIGHT BREAST AND NIPPLE: Chronic | ICD-10-CM

## 2020-03-30 DIAGNOSIS — Z20.828 CONTACT WITH AND (SUSPECTED) EXPOSURE TO OTHER VIRAL COMMUNICABLE DISEASES: ICD-10-CM

## 2020-03-30 DIAGNOSIS — Z85.3 PERSONAL HISTORY OF MALIGNANT NEOPLASM OF BREAST: ICD-10-CM

## 2020-03-30 DIAGNOSIS — Z98.82 BREAST IMPLANT STATUS: Chronic | ICD-10-CM

## 2020-03-30 DIAGNOSIS — Z79.899 OTHER LONG TERM (CURRENT) DRUG THERAPY: ICD-10-CM

## 2020-03-30 DIAGNOSIS — Z98.890 OTHER SPECIFIED POSTPROCEDURAL STATES: Chronic | ICD-10-CM

## 2020-03-30 PROCEDURE — 99283 EMERGENCY DEPT VISIT LOW MDM: CPT

## 2020-03-30 PROCEDURE — 87635 SARS-COV-2 COVID-19 AMP PRB: CPT

## 2020-03-30 NOTE — ED ADULT TRIAGE NOTE - CHIEF COMPLAINT QUOTE
Clinic Visit Summary    November 14, 2017      ROSS MANN Description:  Female YOB: 1969   MRN: [id number] Provider:  Suresh Garay M.D.    Primary Physician:  Colton Calvillo MD  Referring Physician:  Prieto Kaufman MD     Insurance Information  [employer] [id number] 10/1/2014     Reason for Visit  Follow Up Appointment     Health History  Malignant neoplasm of upper-outer quadrant of right female breast [ICD10] C50.411    Vitals (last recorded)  Heading     Test Name B/P P Height (in) (inches) Height (cm) (cm) Weight (lb) (lb) Weight (kg) (kg) T (C)   11/14/2017 2:32 /64 84 64 162.56 193.34 87.7 99     Allergies (as reported by patient)  Allergy Type Name Reaction     No Known Drug Allergies        Current Medications (as reported by patient)  Drug Name Dose Strength Route   vitamin D3 [calcitriol] 1 tab       vitamin B complex 1 tab   Oral        Medications prescribed today  Date Description Ordering Physician      Orders  Date Description Ordering Physician     Next Visit Information  Appointment Date Appointment Time Activity   11/14/2017 2:30 PM Registration   11/14/2017 2:45 PM RN Follow Up   11/14/2017 3:00 PM Follow Up   11/29/2018 1:15 PM Registration - FU   11/29/2018 1:30 PM RN Follow Up   11/29/2018 1:45 PM Follow Up     Special instructions            Reminders    · If you did not schedule your follow up appointment at the time of your visit, please call the department at [phone].  · Please provide a copy of this summary of care to your other providers.  · Please remember to bring the following items to your next appointment:  o Current medications or a list of your current medications.  o Insurance card and a photo ID.  § Please bring insurance and ID cards with you every time you come.  For safety and billing reasons, we must have copies of these documents in your medical record and we must verify your identity on a regular basis.  o Primary  cough, headache care physician referral, if applicable.  § If you are a member of an HMO or PPO that requires a referral before receiving specialty care, please obtain the referral from your primary care physician (PCP) prior to your appointment.  Please contact your insurance provider to learn if your coverage requires referrals to a specialist. If you do not have a valid referral, you may be asked to reschedule your visit.  Some referrals are valid for a certain number of weeks or visits; please keep track of how many weeks or visits have passed since obtaining the referral so that you will know when to ask your PCP for a new referral.  Obtaining referrals is your responsibility.  Your insurance provider will likely require you to pay the full cost of visits without a valid referral.        This summary document is based solely upon information made available to the [facility] staff as of 11/14/17.  Please notify us of additional information related to your care at [phone].

## 2020-03-30 NOTE — ED STATDOCS - PATIENT PORTAL LINK FT
Statement Selected
You can access the FollowMyHealth Patient Portal offered by [facility] by registering at the following website: [email or web address]. By joining [employer]’s FollowMyHealth portal, you will also be able to view your health information using other applications (apps) compatible with our system.

## 2020-03-30 NOTE — ED STATDOCS - PHYSICAL EXAMINATION
Constitutional: NAD AAOx3  Eyes: EOMI, pupils equal  Head: Normocephalic atraumatic  Mouth: no airway obstruction  Cardiac: regular rate   Resp: Lungs CTAB  GI: Abd s/nt/nd  Neuro: CN2-12 intact  Skin: No rashes

## 2020-03-30 NOTE — ED STATDOCS - NSFOLLOWUPINSTRUCTIONS_ED_ALL_ED_FT
How to get your Coronavirus (COVID-19) Testing Results:   Please be advised that you were tested for the coronavirus (COVID-19) in the Emergency Department at [facility].  You are to maintain self-quarantine procedures for 14 days until instructed otherwise by one of our healthcare agents. Please note that the test may take up to 2-4 days to result.  If you do not hear from us within 72 hours and you'd like to check on your results, you can call on of our coronavirus specialists at [address] [place] [address] (available 24/7).  Please DO NOT call the site where you received the test to obtain your results.

## 2020-03-30 NOTE — ED STATDOCS - OBJECTIVE STATEMENT
Pt presents to ED with cough, body aches, x 12 days. Pt recently exposed to COVID-19. She just finished chemo for breast Ca and is supposed to start radiation next week.  Pt here for testing.

## 2020-03-30 NOTE — ED STATDOCS - NS ED ROS FT
ROS: Constitutional- no fever, +chills.  Respiratory- +cough, +SOB  Cardiac- no chest pain, no palpitations, ENT- no rhinorrhea, no sore throat, no congestion.  Abdomen- No nausea, no vomiting, no diarrhea.  Urinary- no dysuria, no urgency, no frequency.  Skin- No rashes

## 2020-03-31 LAB — SARS-COV-2 RNA SPEC QL NAA+PROBE: SIGNIFICANT CHANGE UP

## 2020-04-22 ENCOUNTER — APPOINTMENT (OUTPATIENT)
Dept: BREAST CENTER | Facility: CLINIC | Age: 55
End: 2020-04-22

## 2020-05-05 ENCOUNTER — OUTPATIENT (OUTPATIENT)
Dept: OUTPATIENT SERVICES | Facility: HOSPITAL | Age: 55
LOS: 1 days | Discharge: HOME | End: 2020-05-05

## 2020-05-05 DIAGNOSIS — Z17.0 ESTROGEN RECEPTOR POSITIVE STATUS [ER+]: ICD-10-CM

## 2020-05-05 DIAGNOSIS — Z98.890 OTHER SPECIFIED POSTPROCEDURAL STATES: Chronic | ICD-10-CM

## 2020-05-05 DIAGNOSIS — Z98.82 BREAST IMPLANT STATUS: Chronic | ICD-10-CM

## 2020-05-05 DIAGNOSIS — C50.512 MALIGNANT NEOPLASM OF LOWER-OUTER QUADRANT OF LEFT FEMALE BREAST: ICD-10-CM

## 2020-05-05 DIAGNOSIS — Z90.11 ACQUIRED ABSENCE OF RIGHT BREAST AND NIPPLE: Chronic | ICD-10-CM

## 2020-05-08 RX ORDER — RIVAROXABAN 20 MG/1
20 TABLET, FILM COATED ORAL
Qty: 90 | Refills: 3 | Status: DISCONTINUED | COMMUNITY
Start: 2019-10-29 | End: 2020-05-08

## 2020-05-08 RX ORDER — ALPRAZOLAM 0.25 MG/1
0.25 TABLET ORAL
Qty: 30 | Refills: 0 | Status: DISCONTINUED | COMMUNITY
Start: 2019-11-19 | End: 2020-05-08

## 2020-05-11 ENCOUNTER — APPOINTMENT (OUTPATIENT)
Age: 55
End: 2020-05-11

## 2020-05-11 ENCOUNTER — RESULT REVIEW (OUTPATIENT)
Age: 55
End: 2020-05-11

## 2020-05-11 ENCOUNTER — APPOINTMENT (OUTPATIENT)
Age: 55
End: 2020-05-11
Payer: COMMERCIAL

## 2020-05-11 VITALS — SYSTOLIC BLOOD PRESSURE: 103 MMHG | DIASTOLIC BLOOD PRESSURE: 68 MMHG

## 2020-05-11 VITALS
SYSTOLIC BLOOD PRESSURE: 103 MMHG | HEART RATE: 99 BPM | WEIGHT: 207 LBS | RESPIRATION RATE: 16 BRPM | DIASTOLIC BLOOD PRESSURE: 68 MMHG | BODY MASS INDEX: 30.66 KG/M2 | TEMPERATURE: 97.6 F | HEIGHT: 69 IN

## 2020-05-11 LAB
BASOPHILS # BLD AUTO: 0.08 K/UL — SIGNIFICANT CHANGE UP (ref 0–0.2)
BASOPHILS NFR BLD AUTO: 1.1 % — SIGNIFICANT CHANGE UP (ref 0–2)
EOSINOPHIL # BLD AUTO: 0.24 K/UL — SIGNIFICANT CHANGE UP (ref 0–0.5)
EOSINOPHIL NFR BLD AUTO: 3.4 % — SIGNIFICANT CHANGE UP (ref 0–6)
HCT VFR BLD CALC: 38.1 % — SIGNIFICANT CHANGE UP (ref 34.5–45)
HGB BLD-MCNC: 12.5 G/DL — SIGNIFICANT CHANGE UP (ref 11.5–15.5)
IMM GRANULOCYTES NFR BLD AUTO: 0.3 % — SIGNIFICANT CHANGE UP (ref 0–1.5)
LYMPHOCYTES # BLD AUTO: 1.08 K/UL — SIGNIFICANT CHANGE UP (ref 1–3.3)
LYMPHOCYTES # BLD AUTO: 15.1 % — SIGNIFICANT CHANGE UP (ref 13–44)
MCHC RBC-ENTMCNC: 28.9 PG — SIGNIFICANT CHANGE UP (ref 27–34)
MCHC RBC-ENTMCNC: 32.8 GM/DL — SIGNIFICANT CHANGE UP (ref 32–36)
MCV RBC AUTO: 88 FL — SIGNIFICANT CHANGE UP (ref 80–100)
MONOCYTES # BLD AUTO: 0.68 K/UL — SIGNIFICANT CHANGE UP (ref 0–0.9)
MONOCYTES NFR BLD AUTO: 9.5 % — SIGNIFICANT CHANGE UP (ref 2–14)
NEUTROPHILS # BLD AUTO: 5.04 K/UL — SIGNIFICANT CHANGE UP (ref 1.8–7.4)
NEUTROPHILS NFR BLD AUTO: 70.6 % — SIGNIFICANT CHANGE UP (ref 43–77)
NRBC # BLD: 0 /100 WBCS — SIGNIFICANT CHANGE UP (ref 0–0)
PLATELET # BLD AUTO: 342 K/UL — SIGNIFICANT CHANGE UP (ref 150–400)
RBC # BLD: 4.33 M/UL — SIGNIFICANT CHANGE UP (ref 3.8–5.2)
RBC # FLD: 13.6 % — SIGNIFICANT CHANGE UP (ref 10.3–14.5)
WBC # BLD: 7.14 K/UL — SIGNIFICANT CHANGE UP (ref 3.8–10.5)
WBC # FLD AUTO: 7.14 K/UL — SIGNIFICANT CHANGE UP (ref 3.8–10.5)

## 2020-05-11 PROCEDURE — 99214 OFFICE O/P EST MOD 30 MIN: CPT

## 2020-05-11 NOTE — REVIEW OF SYSTEMS
[Skin Wound] : skin wound [Negative] : Neurological [Skin Rash] : skin rash [de-identified] : Hyperpigmented rash left side anterior chest wall/breast, secondary to XRT; pronounced skin sensitivity on said area

## 2020-05-11 NOTE — PHYSICAL EXAM
[Fully active, able to carry on all pre-disease performance without restriction] : Status 0 - Fully active, able to carry on all pre-disease performance without restriction [Normal] : full range of motion and no deformities appreciated [de-identified] : bilateral mastectomy ; bilateral DMITRI reconstruction, scars well healed. [de-identified] : transverse abdominal incision healing; right sided wound opening with minimal serosanguineous secretions; left sided drain catheter in place, draining small amount of serosanguineous fluid [de-identified] : Left chest wall and left breast erythema secondary to XRT

## 2020-05-11 NOTE — HISTORY OF PRESENT ILLNESS
[Disease: _____________________] : Disease: [unfilled] [T: ___] : T[unfilled] [N: ___] : N[unfilled] [AJCC Stage: ____] : AJCC Stage: [unfilled] [de-identified] : 54 F with history right breast cancer at age 37, s/p right mastectomy with saline implant reconstruction, and nipple reconstruction with left nipple graft, s/p adjuvant chemotherapy x 6 months (Dr. Rene White), referred for medical oncologic consultation after diagnosis of left breast carcinoma in September 2019. \par \par CASE SYNOPSIS: \par 5/29/03- segmental resection right breast for moderately differentiated infiltrating ductal carcinoma; margin involved by infiltrating tumor.\par \par 7/10/03- right total mastectomy with saline implant reconstruction( Meghna Townsend/ Benny Grover). Received adjuvant systemic chemotherapy ( records to be obtained), under the care of Dr. Don White.\par \par 10/2018- reports lower left breast itching.\par \par 10/31/18- mammogram/ breast US: no suspicious lesions, no evidence of malignancy, intramammary lymph node 2:00 left breast.\par \par 6/24/19 MRI breast – right implant intact; left breast 5:00 N8, 2 adjacent masses 1, and 1.5 cm; abnormal clumped enhancement extending from masses medially, multiple additional masses throughout left breast: 6:00 N4 8 mm, 3:00 N6 1 cm, 2:00 N6 7 mm, 12:00 N6 9 mm, 10:00 N4 8 mm. Multiple small axillary lymph nodes.\par \par 6/25/19- DEXA scan: low bone mass based on left femoral neck T score (-1.2 SD from the mean).\par \par 7/9/19: left breast US and US-guided core biopsy; left axilla with sonographically normal lymph node. \par Left 4-5:00 N6 9 x 7 x 10 mm and irregular inferior mass 18 x 9 x 14 mm. Pathology: \par Left 4-5:00 superior- infiltrating ductal carcinoma, SBR 6/9, ER90%, GA 90%, Her 2 negative. \par Left 4-5:00 inferior - infiltrating ductal carcinoma, SBR 6/9, ER90%, GA 70%, Her 2 negative.\par \par 9/12/19: left mastectomy with sentinel node biopsy and removal of both implants with bilateral DMITRI flap reconstruction. \par Pathology: left breast with 2 foci of invasive ductal carcinoma 2.5 cm and 1.2 cm , SBR 6/9, with foci of DCIS in UOQ, + LVI and PNI, margins negative, 1 / 2 SLN positive ( 8 mm ; no extracapsular extension), 2 more negative non- sentinel LN,  right and left internal mammary nodes negative.(pT2, pN1, Mx = stage IIB)\par \par 10/1/19- develops venous thrombosis in the basilic vein, superficial, extended for at least 10 cm.  LYMPHA procedure postponed. Started on anticoagulation.\par \par 10/2/19: PET/CT consistent with FDG avidity associated with the reconstructed bilateral breast in the anterior abdominal wall; no FDG avid local regional or distant metastatic disease.\par \par 10/17/19 – chemo- education session.\par \par 11/5/19- 2/18/20- completes 6 cycles Docetaxel/ Cyclophosphamide.\par \par 4/6- 5/8/20-  XRT left chest wall.\par \par 5/8/20- starts anastrozole.\par  [de-identified] : infiltrating ductal carcinoma [de-identified] : ER 90%, LA 90%, Her 2 negative [FreeTextEntry1] : docetaxel/ cyclophosphamide\par Anastrozole–started 5/8/2020 [de-identified] : Ms. RODRIGUEZ is returning for follow up .Last seen in office in early March 2020, after completion of systemic chemotherapy on 2/18/20. She has subsequently started XRT on 4/6/20; has left breast burning/ itching during the last week of treatment.Patient has completed XRT to left chest wall/breast on 5/8/2020; reports left-sided chest wall discomfort and erythema post radiation, otherwise no new constitutional symptoms. Hematologic picture normal–CBC normal, serum tumor marker CA-27-29 pending. Compliant with antiestrogen medication (anastrozole) which was started 5/8/2020; list reviewed. Reports no recent infections, fevers, no changes in weight; appetite preserved.  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

## 2020-05-11 NOTE — RESULTS/DATA
[FreeTextEntry1] : I reviewed today's and recent blood work results with patient.\par \par 2/18/20:\par WBC 13.54 K, hemoglobin 11 g/ dL, hematocrit  34.3 %, platelet  444,000\par \par 1/7/20:\par WBC 10.56 K, hemoglobin 11.5 g/ dL, hematocrit 35.6 %, platelet  504,000\par \par 11/5/19:\par WBC 14.62, hemoglobin 12.7 g/dL, hematocrit 39.1%, platelets 481 pounds\par \par 9/13/19: \par WBC 16.23, hemoglobin 10.7 g/dL, hematocrit 33%, platelet 343,000

## 2020-05-11 NOTE — ASSESSMENT
[FreeTextEntry1] : Will RTC in 3 months. Ms. RODRIGUEZ  was advised to contact the office should she have any additional questions or concerns in the interim.

## 2020-05-12 LAB
ALBUMIN SERPL ELPH-MCNC: 4.2 G/DL — SIGNIFICANT CHANGE UP (ref 3.3–5)
ALP SERPL-CCNC: 53 U/L — SIGNIFICANT CHANGE UP (ref 40–120)
ALT FLD-CCNC: 10 U/L — SIGNIFICANT CHANGE UP (ref 10–45)
ANION GAP SERPL CALC-SCNC: 15 MMOL/L — SIGNIFICANT CHANGE UP (ref 5–17)
AST SERPL-CCNC: 11 U/L — SIGNIFICANT CHANGE UP (ref 10–40)
BILIRUB SERPL-MCNC: <0.2 MG/DL — SIGNIFICANT CHANGE UP (ref 0.2–1.2)
BUN SERPL-MCNC: 16 MG/DL — SIGNIFICANT CHANGE UP (ref 7–23)
CALCIUM SERPL-MCNC: 9.3 MG/DL — SIGNIFICANT CHANGE UP (ref 8.4–10.5)
CANCER AG27-29 SERPL-ACNC: 7.8 U/ML — SIGNIFICANT CHANGE UP (ref 0–38.6)
CHLORIDE SERPL-SCNC: 107 MMOL/L — SIGNIFICANT CHANGE UP (ref 96–108)
CO2 SERPL-SCNC: 22 MMOL/L — SIGNIFICANT CHANGE UP (ref 22–31)
CREAT SERPL-MCNC: 0.75 MG/DL — SIGNIFICANT CHANGE UP (ref 0.5–1.3)
GLUCOSE SERPL-MCNC: 96 MG/DL — SIGNIFICANT CHANGE UP (ref 70–99)
POTASSIUM SERPL-MCNC: 4.3 MMOL/L — SIGNIFICANT CHANGE UP (ref 3.5–5.3)
POTASSIUM SERPL-SCNC: 4.3 MMOL/L — SIGNIFICANT CHANGE UP (ref 3.5–5.3)
PROT SERPL-MCNC: 6.8 G/DL — SIGNIFICANT CHANGE UP (ref 6–8.3)
SODIUM SERPL-SCNC: 144 MMOL/L — SIGNIFICANT CHANGE UP (ref 135–145)

## 2020-05-28 ENCOUNTER — OUTPATIENT (OUTPATIENT)
Dept: OUTPATIENT SERVICES | Facility: HOSPITAL | Age: 55
LOS: 1 days | End: 2020-05-28
Payer: COMMERCIAL

## 2020-05-28 ENCOUNTER — APPOINTMENT (OUTPATIENT)
Age: 55
End: 2020-05-28
Payer: COMMERCIAL

## 2020-05-28 ENCOUNTER — APPOINTMENT (OUTPATIENT)
Dept: RADIOLOGY | Facility: CLINIC | Age: 55
End: 2020-05-28
Payer: COMMERCIAL

## 2020-05-28 VITALS
DIASTOLIC BLOOD PRESSURE: 68 MMHG | WEIGHT: 207 LBS | BODY MASS INDEX: 30.66 KG/M2 | HEIGHT: 69 IN | HEART RATE: 96 BPM | TEMPERATURE: 97.8 F | RESPIRATION RATE: 16 BRPM | SYSTOLIC BLOOD PRESSURE: 105 MMHG

## 2020-05-28 DIAGNOSIS — Z98.890 OTHER SPECIFIED POSTPROCEDURAL STATES: Chronic | ICD-10-CM

## 2020-05-28 DIAGNOSIS — Z00.8 ENCOUNTER FOR OTHER GENERAL EXAMINATION: ICD-10-CM

## 2020-05-28 DIAGNOSIS — Z90.11 ACQUIRED ABSENCE OF RIGHT BREAST AND NIPPLE: Chronic | ICD-10-CM

## 2020-05-28 DIAGNOSIS — Z98.82 BREAST IMPLANT STATUS: Chronic | ICD-10-CM

## 2020-05-28 PROCEDURE — 71046 X-RAY EXAM CHEST 2 VIEWS: CPT

## 2020-05-28 PROCEDURE — 99215 OFFICE O/P EST HI 40 MIN: CPT

## 2020-05-28 PROCEDURE — 71046 X-RAY EXAM CHEST 2 VIEWS: CPT | Mod: 26

## 2020-05-28 NOTE — ADDENDUM
[FreeTextEntry1] : Patient reports she has been on Arimidex since ~ 5/11/20.  Over last few days she has not been feeling well - increased joint pains, fatigue.  Decided to see  yest and had routine labs yesterday - results pending.  Also has mild chest heaviness - CXR done.  Reviewed this may be from her Arimidex.  Encouraged to take for another 2 weeks as sometimes side effects may start to lessen after 1st  month, however was told if she cannot tolerate then to call since she may need to try another brand.  \par She mentioned a lot of her friends are on Tamoxifen - reviewed difference between MOA of Tamoxifen and AI's and why we are trying AI's 1st.  \par All questions answered.  Patient found session very helpful. \par 50 minutes spent on 1:1 counseling, >50% face to face.

## 2020-05-28 NOTE — DISCUSSION/SUMMARY
[FreeTextEntry2] : You will need to be on hormonal therapy for 10 years.  Currently on Anastrazole (Arimidex) which is an aromatase inhibitor (AI).  \par Common side effects of AI's can include hot flashes, joint aches/pains, and hair thinning.  We may also see higher cholesterol levels on blood work and bone loss may be accelerated. [FreeTextEntry1] : Was scheduled for Axillary LN dissection 10/4/19 but then canceled as developed a clot in basilic vein on 10/2/20.  Superficial but extended at least 10cm  - started on Xarelto and surgery postponed.  10/2/20 - PET/CT with no FDG avid locoregional or distant metastatic disease  - started on adjuvant chemotherapy. \par Oncotype with Recurrence Score of 24.\par Adjuvant therapy with "TC" - Taxotere and Cytoxan:\par Docetaxel (Taxotere)                   75mg/m2         D1 Q21 x 6\par Cyclophosphamide (Cytoxan)  [id number]           D1 Q21 x 6\par Patient had no dose reductions or dose delays.\par Chemo was given from 11/5/19 - 2/18/20.\par Of note Adriamycin was avoided as although prior treatment records were no available patient recalled having received a "red" drug as part of the regime). [FreeTextEntry7] : [employer] [facility] at the [facility] - Life After Cancer Treatment [de-identified] : Will need routine port flushes every 4 - 6 weeks until removed.  [FreeTextEntry9] : 5/28/20 [FreeTextEntry8] : Keely Escamilla, RN, NP, AOCNP

## 2020-06-10 ENCOUNTER — APPOINTMENT (OUTPATIENT)
Dept: OBGYN | Facility: CLINIC | Age: 55
End: 2020-06-10

## 2020-06-24 ENCOUNTER — APPOINTMENT (OUTPATIENT)
Dept: BREAST CENTER | Facility: CLINIC | Age: 55
End: 2020-06-24
Payer: COMMERCIAL

## 2020-06-24 VITALS
SYSTOLIC BLOOD PRESSURE: 106 MMHG | HEIGHT: 69 IN | HEART RATE: 96 BPM | DIASTOLIC BLOOD PRESSURE: 66 MMHG | WEIGHT: 206 LBS | BODY MASS INDEX: 30.51 KG/M2

## 2020-06-24 DIAGNOSIS — R07.81 PLEURODYNIA: ICD-10-CM

## 2020-06-24 PROCEDURE — 99213 OFFICE O/P EST LOW 20 MIN: CPT

## 2020-06-24 NOTE — PHYSICAL EXAM
[Sclera nonicteric] : sclera nonicteric [EOMI] : extra ocular movement intact [No Supraclavicular Adenopathy] : no supraclavicular adenopathy [Supple] : supple [No Thyromegaly] : no thyromegaly [No Cervical Adenopathy] : no cervical adenopathy [No dominant masses] : no dominant masses in right breast  [Clear to Auscultation Bilat] : clear to auscultation bilaterally [No dominant masses] : no dominant masses left breast [No Axillary Lymphadenopathy] : no right axillary lymphadenopathy [Examined in the supine and seated position] : examined in the supine and seated position [de-identified] : Circular skin islands.  Medial and lateral scars. [de-identified] : Circular skin islands. Medial and lateral scars. Mild radiation change.\par Tenderness in lateral rib area. [de-identified] : Transverse scar.

## 2020-06-24 NOTE — HISTORY OF PRESENT ILLNESS
[FreeTextEntry1] : This is a 54 year old  female who has a history of right breast cancer at age 37 treated with mastectomy/saline implant  reconstruction (Dr. Townsend/Reilly) and 6 months of chemotherapy (Dr. White- one drug was "red").  The right nipple was reconstructed with a left nipple graft. \par \par She complained of left breast itching in the lower breast since October 2018.  She had a mammogram and ultrasound that were negative.  She changed gynecologists to Dr. Faust in June and still complained of itching, so he sent her for an MRI. The MRI showed a number of abnormalities and a biopsy of 2 adjacent areas confirmed cancer. She has always had small pimple like lesions of her left nipple. She had a left breast saline implant that had been ruptured for at least 2 years.\par \par She underwent a left mastectomy with sentinel node biopsy and removal of both implants with bilateral DMITRI flap reconstructions on 9/12/2019.  She had two areas of cancer up to 2.5 cm.  One sentinel node was positive on final pathology.  ER 90% IL 70% Her 2 neg, Oncotype score was 24 (19%).\par \par She was scheduled for a left axillary dissection with LYMPHA procedure.  She was seen on Oct 2 complaining of a tender cord in her right forearm that she noticed about 5 days prior.  She had an extensive superficial phlebitis of her basilic vein.  She was started on Xarelto and the surgery was canceled.\par \par She received chemotherapy with TCx6.  \par \par She completed radiation in early May and is on anastrazole.\par \par She complains of burning discomfort along the left inferior reconstructed breast, similar to what she had before surgery.  But she now complains of pain in her lateral left ribs.  It does not increase with inspiration, but seems worse with certain movements.  She is very concerned about it being caused by the cancer.\par

## 2020-06-24 NOTE — CONSULT LETTER
[Sincerely,] : Sincerely, [Dear  ___] : Dear  [unfilled], [Consult Letter:] : I had the pleasure of evaluating your patient, [unfilled]. [DrPepe  ___] : Dr. CANELA [DrPepe ___] : Dr. CANELA

## 2020-07-10 ENCOUNTER — APPOINTMENT (OUTPATIENT)
Dept: NUCLEAR MEDICINE | Facility: CLINIC | Age: 55
End: 2020-07-10
Payer: COMMERCIAL

## 2020-07-10 ENCOUNTER — OUTPATIENT (OUTPATIENT)
Dept: OUTPATIENT SERVICES | Facility: HOSPITAL | Age: 55
LOS: 1 days | End: 2020-07-10

## 2020-07-10 DIAGNOSIS — C50.512 MALIGNANT NEOPLASM OF LOWER-OUTER QUADRANT OF LEFT FEMALE BREAST: ICD-10-CM

## 2020-07-10 DIAGNOSIS — Z98.82 BREAST IMPLANT STATUS: Chronic | ICD-10-CM

## 2020-07-10 DIAGNOSIS — Z98.890 OTHER SPECIFIED POSTPROCEDURAL STATES: Chronic | ICD-10-CM

## 2020-07-10 DIAGNOSIS — Z90.11 ACQUIRED ABSENCE OF RIGHT BREAST AND NIPPLE: Chronic | ICD-10-CM

## 2020-07-10 PROCEDURE — 78306 BONE IMAGING WHOLE BODY: CPT | Mod: 26

## 2020-07-22 ENCOUNTER — EMERGENCY (EMERGENCY)
Facility: HOSPITAL | Age: 55
LOS: 0 days | Discharge: HOME | End: 2020-07-22
Attending: STUDENT IN AN ORGANIZED HEALTH CARE EDUCATION/TRAINING PROGRAM
Payer: COMMERCIAL

## 2020-07-22 VITALS
RESPIRATION RATE: 17 BRPM | DIASTOLIC BLOOD PRESSURE: 49 MMHG | TEMPERATURE: 98 F | OXYGEN SATURATION: 100 % | HEART RATE: 74 BPM | SYSTOLIC BLOOD PRESSURE: 109 MMHG

## 2020-07-22 VITALS — HEIGHT: 69 IN | WEIGHT: 162.04 LBS

## 2020-07-22 DIAGNOSIS — R07.89 OTHER CHEST PAIN: ICD-10-CM

## 2020-07-22 DIAGNOSIS — N80.9 ENDOMETRIOSIS, UNSPECIFIED: ICD-10-CM

## 2020-07-22 DIAGNOSIS — Z90.11 ACQUIRED ABSENCE OF RIGHT BREAST AND NIPPLE: Chronic | ICD-10-CM

## 2020-07-22 DIAGNOSIS — Z85.3 PERSONAL HISTORY OF MALIGNANT NEOPLASM OF BREAST: ICD-10-CM

## 2020-07-22 DIAGNOSIS — Z98.82 BREAST IMPLANT STATUS: Chronic | ICD-10-CM

## 2020-07-22 DIAGNOSIS — Z86.718 PERSONAL HISTORY OF OTHER VENOUS THROMBOSIS AND EMBOLISM: ICD-10-CM

## 2020-07-22 DIAGNOSIS — Z90.13 ACQUIRED ABSENCE OF BILATERAL BREASTS AND NIPPLES: ICD-10-CM

## 2020-07-22 DIAGNOSIS — R07.9 CHEST PAIN, UNSPECIFIED: ICD-10-CM

## 2020-07-22 DIAGNOSIS — Z98.890 OTHER SPECIFIED POSTPROCEDURAL STATES: Chronic | ICD-10-CM

## 2020-07-22 DIAGNOSIS — R91.8 OTHER NONSPECIFIC ABNORMAL FINDING OF LUNG FIELD: ICD-10-CM

## 2020-07-22 DIAGNOSIS — Z79.01 LONG TERM (CURRENT) USE OF ANTICOAGULANTS: ICD-10-CM

## 2020-07-22 LAB
ALBUMIN SERPL ELPH-MCNC: 3.5 G/DL — SIGNIFICANT CHANGE UP (ref 3.3–5)
ALP SERPL-CCNC: 57 U/L — SIGNIFICANT CHANGE UP (ref 40–120)
ALT FLD-CCNC: 19 U/L — SIGNIFICANT CHANGE UP (ref 12–78)
ANION GAP SERPL CALC-SCNC: 3 MMOL/L — LOW (ref 5–17)
APPEARANCE UR: CLEAR — SIGNIFICANT CHANGE UP
APTT BLD: 31.7 SEC — SIGNIFICANT CHANGE UP (ref 27.5–35.5)
AST SERPL-CCNC: 9 U/L — LOW (ref 15–37)
BASOPHILS # BLD AUTO: 0.12 K/UL — SIGNIFICANT CHANGE UP (ref 0–0.2)
BASOPHILS NFR BLD AUTO: 1.5 % — SIGNIFICANT CHANGE UP (ref 0–2)
BILIRUB SERPL-MCNC: 0.2 MG/DL — SIGNIFICANT CHANGE UP (ref 0.2–1.2)
BILIRUB UR-MCNC: NEGATIVE — SIGNIFICANT CHANGE UP
BUN SERPL-MCNC: 14 MG/DL — SIGNIFICANT CHANGE UP (ref 7–23)
CALCIUM SERPL-MCNC: 8.9 MG/DL — SIGNIFICANT CHANGE UP (ref 8.5–10.1)
CHLORIDE SERPL-SCNC: 109 MMOL/L — HIGH (ref 96–108)
CK SERPL-CCNC: 62 U/L — SIGNIFICANT CHANGE UP (ref 26–192)
CO2 SERPL-SCNC: 30 MMOL/L — SIGNIFICANT CHANGE UP (ref 22–31)
COLOR SPEC: YELLOW — SIGNIFICANT CHANGE UP
CREAT SERPL-MCNC: 0.84 MG/DL — SIGNIFICANT CHANGE UP (ref 0.5–1.3)
D DIMER BLD IA.RAPID-MCNC: 306 NG/ML DDU — HIGH
DIFF PNL FLD: ABNORMAL
EOSINOPHIL # BLD AUTO: 0.33 K/UL — SIGNIFICANT CHANGE UP (ref 0–0.5)
EOSINOPHIL NFR BLD AUTO: 4 % — SIGNIFICANT CHANGE UP (ref 0–6)
GLUCOSE SERPL-MCNC: 85 MG/DL — SIGNIFICANT CHANGE UP (ref 70–99)
GLUCOSE UR QL: NEGATIVE MG/DL — SIGNIFICANT CHANGE UP
HCT VFR BLD CALC: 40.6 % — SIGNIFICANT CHANGE UP (ref 34.5–45)
HGB BLD-MCNC: 13.2 G/DL — SIGNIFICANT CHANGE UP (ref 11.5–15.5)
IMM GRANULOCYTES NFR BLD AUTO: 0.4 % — SIGNIFICANT CHANGE UP (ref 0–1.5)
INR BLD: 0.99 RATIO — SIGNIFICANT CHANGE UP (ref 0.88–1.16)
KETONES UR-MCNC: NEGATIVE — SIGNIFICANT CHANGE UP
LEUKOCYTE ESTERASE UR-ACNC: ABNORMAL
LYMPHOCYTES # BLD AUTO: 1.39 K/UL — SIGNIFICANT CHANGE UP (ref 1–3.3)
LYMPHOCYTES # BLD AUTO: 17 % — SIGNIFICANT CHANGE UP (ref 13–44)
MAGNESIUM SERPL-MCNC: 2.1 MG/DL — SIGNIFICANT CHANGE UP (ref 1.6–2.6)
MCHC RBC-ENTMCNC: 28.8 PG — SIGNIFICANT CHANGE UP (ref 27–34)
MCHC RBC-ENTMCNC: 32.5 GM/DL — SIGNIFICANT CHANGE UP (ref 32–36)
MCV RBC AUTO: 88.6 FL — SIGNIFICANT CHANGE UP (ref 80–100)
MONOCYTES # BLD AUTO: 0.66 K/UL — SIGNIFICANT CHANGE UP (ref 0–0.9)
MONOCYTES NFR BLD AUTO: 8.1 % — SIGNIFICANT CHANGE UP (ref 2–14)
NEUTROPHILS # BLD AUTO: 5.63 K/UL — SIGNIFICANT CHANGE UP (ref 1.8–7.4)
NEUTROPHILS NFR BLD AUTO: 69 % — SIGNIFICANT CHANGE UP (ref 43–77)
NITRITE UR-MCNC: NEGATIVE — SIGNIFICANT CHANGE UP
NT-PROBNP SERPL-SCNC: 309 PG/ML — HIGH (ref 0–125)
PH UR: 5 — SIGNIFICANT CHANGE UP (ref 5–8)
PLATELET # BLD AUTO: 367 K/UL — SIGNIFICANT CHANGE UP (ref 150–400)
POTASSIUM SERPL-MCNC: 3.9 MMOL/L — SIGNIFICANT CHANGE UP (ref 3.5–5.3)
POTASSIUM SERPL-SCNC: 3.9 MMOL/L — SIGNIFICANT CHANGE UP (ref 3.5–5.3)
PROT SERPL-MCNC: 7.3 GM/DL — SIGNIFICANT CHANGE UP (ref 6–8.3)
PROT UR-MCNC: NEGATIVE MG/DL — SIGNIFICANT CHANGE UP
PROTHROM AB SERPL-ACNC: 11.5 SEC — SIGNIFICANT CHANGE UP (ref 10.6–13.6)
RBC # BLD: 4.58 M/UL — SIGNIFICANT CHANGE UP (ref 3.8–5.2)
RBC # FLD: 14.2 % — SIGNIFICANT CHANGE UP (ref 10.3–14.5)
SODIUM SERPL-SCNC: 142 MMOL/L — SIGNIFICANT CHANGE UP (ref 135–145)
SP GR SPEC: 1.02 — SIGNIFICANT CHANGE UP (ref 1.01–1.02)
TROPONIN I SERPL-MCNC: <0.015 NG/ML — SIGNIFICANT CHANGE UP (ref 0.01–0.04)
TSH SERPL-MCNC: 0.88 UU/ML — SIGNIFICANT CHANGE UP (ref 0.34–4.82)
UROBILINOGEN FLD QL: NEGATIVE MG/DL — SIGNIFICANT CHANGE UP
WBC # BLD: 8.16 K/UL — SIGNIFICANT CHANGE UP (ref 3.8–10.5)
WBC # FLD AUTO: 8.16 K/UL — SIGNIFICANT CHANGE UP (ref 3.8–10.5)

## 2020-07-22 PROCEDURE — 81001 URINALYSIS AUTO W/SCOPE: CPT

## 2020-07-22 PROCEDURE — 36415 COLL VENOUS BLD VENIPUNCTURE: CPT

## 2020-07-22 PROCEDURE — 99284 EMERGENCY DEPT VISIT MOD MDM: CPT

## 2020-07-22 PROCEDURE — 93010 ELECTROCARDIOGRAM REPORT: CPT

## 2020-07-22 PROCEDURE — 83735 ASSAY OF MAGNESIUM: CPT

## 2020-07-22 PROCEDURE — 85610 PROTHROMBIN TIME: CPT

## 2020-07-22 PROCEDURE — 93005 ELECTROCARDIOGRAM TRACING: CPT

## 2020-07-22 PROCEDURE — 71275 CT ANGIOGRAPHY CHEST: CPT | Mod: 26

## 2020-07-22 PROCEDURE — 83880 ASSAY OF NATRIURETIC PEPTIDE: CPT

## 2020-07-22 PROCEDURE — 87086 URINE CULTURE/COLONY COUNT: CPT

## 2020-07-22 PROCEDURE — 99284 EMERGENCY DEPT VISIT MOD MDM: CPT | Mod: 25

## 2020-07-22 PROCEDURE — 80053 COMPREHEN METABOLIC PANEL: CPT

## 2020-07-22 PROCEDURE — 85025 COMPLETE CBC W/AUTO DIFF WBC: CPT

## 2020-07-22 PROCEDURE — 85730 THROMBOPLASTIN TIME PARTIAL: CPT

## 2020-07-22 PROCEDURE — 85379 FIBRIN DEGRADATION QUANT: CPT

## 2020-07-22 PROCEDURE — 84443 ASSAY THYROID STIM HORMONE: CPT

## 2020-07-22 PROCEDURE — 71275 CT ANGIOGRAPHY CHEST: CPT

## 2020-07-22 PROCEDURE — 82550 ASSAY OF CK (CPK): CPT

## 2020-07-22 PROCEDURE — 84484 ASSAY OF TROPONIN QUANT: CPT

## 2020-07-22 RX ORDER — AZITHROMYCIN 500 MG/1
1 TABLET, FILM COATED ORAL
Qty: 1 | Refills: 0
Start: 2020-07-22 | End: 2020-07-26

## 2020-07-22 RX ORDER — SODIUM CHLORIDE 9 MG/ML
1000 INJECTION INTRAMUSCULAR; INTRAVENOUS; SUBCUTANEOUS ONCE
Refills: 0 | Status: COMPLETED | OUTPATIENT
Start: 2020-07-22 | End: 2020-07-22

## 2020-07-22 RX ADMIN — SODIUM CHLORIDE 1000 MILLILITER(S): 9 INJECTION INTRAMUSCULAR; INTRAVENOUS; SUBCUTANEOUS at 14:30

## 2020-07-22 NOTE — ED STATDOCS - OBJECTIVE STATEMENT
55 y/o female with a PMHx of Breast CA dx 16 years ago s/p mastectomy and on chemo finished in February, Ovarian cyst, Endometriosis, presents to the ED c/o chest heaviness x 2 days, non reproducible. States she "always has chest heaviness" after mastectomy. Went to  today for body aches, SOB and covid testing. Had a normal CXR today. Sent from  for EKG changes. Denies fever, abdominal pain, urinary complaints. Hx of DVT to JENNA, was placed on blood thinners until January, 2020.

## 2020-07-22 NOTE — ED STATDOCS - PROGRESS NOTE DETAILS
55 y/o female with a PMHx of Breast CA dx 16 years ago s/p R mastectomy, came back in September, pt had L masectomy underwent chemo and rad and chemo finished in February,  presents to the ED c/o chest heaviness x 2 days, non reproducible. States she "always has chest heaviness" after mastectomy. Went to  today for body aches, SOB and covid testing. Had a normal CXR today. Sent from  for EKG changes. Denies fever, abdominal pain, urinary complaints. Hx of DVT to JENNA, was placed on blood thinners until January, 2020.  PE: lungs CTA b/l, heart RRR s1/s2  Plan: labs, trop, d-dimer,  Haylie Nix PA-C labs WNL, troponin negative, d-dimer 309, CTA chest preformed which showed no PE, Left upper lobe peripheral groundglass opacity, which may be infectious or inflammatory. Results explained to pt, will give rx for z-pack to cover for any infection, explained to pt that this may be 2/2 COVID infection, pt was tested at urgent care today, advised self quarantine and staying home from work until results obtained.  Will d/c home with outpt f/u, pt agreeable to d/c and plan of care  Haylie Nix PA-C

## 2020-07-22 NOTE — ED ADULT TRIAGE NOTE - CHIEF COMPLAINT QUOTE
Patient presents in ED with chest pain and chest heaviness since Monday. Patient sent from Urgent care for EKG changes.

## 2020-07-22 NOTE — ED STATDOCS - CARE PLAN
Principal Discharge DX:	Chest pain, unspecified type  Secondary Diagnosis:	Ground glass opacity present on imaging of lung

## 2020-07-22 NOTE — ED STATDOCS - PATIENT PORTAL LINK FT
You can access the FollowMyHealth Patient Portal offered by [facility] by registering at the following website: [email or web address]. By joining [facility]’s FollowMyHealth portal, you will also be able to view your health information using other applications (apps) compatible with our system.

## 2020-07-22 NOTE — ED STATDOCS - ATTENDING CONTRIBUTION TO CARE
I, Puja Pereyra DO,  performed the initial face to face bedside interview with this patient regarding history of present illness, review of symptoms and relevant past medical, social and family history.  I completed an independent physical examination.  I was the initial provider who evaluated this patient. I have signed out the follow up of any pending tests (i.e. labs, radiological studies) to the ACP.  I have communicated the patient’s plan of care and disposition with the ACP.  The history, relevant review of systems, past medical and surgical history, medical decision making, and physical examination was documented by the scribe in my presence and I attest to the accuracy of the documentation.

## 2020-07-23 LAB
CULTURE RESULTS: SIGNIFICANT CHANGE UP
SPECIMEN SOURCE: SIGNIFICANT CHANGE UP

## 2020-07-29 ENCOUNTER — OUTPATIENT (OUTPATIENT)
Dept: OUTPATIENT SERVICES | Facility: HOSPITAL | Age: 55
LOS: 1 days | Discharge: HOME | End: 2020-07-29

## 2020-07-29 DIAGNOSIS — Z98.890 OTHER SPECIFIED POSTPROCEDURAL STATES: Chronic | ICD-10-CM

## 2020-07-29 DIAGNOSIS — C50.512 MALIGNANT NEOPLASM OF LOWER-OUTER QUADRANT OF LEFT FEMALE BREAST: ICD-10-CM

## 2020-07-29 DIAGNOSIS — Z98.82 BREAST IMPLANT STATUS: Chronic | ICD-10-CM

## 2020-07-29 DIAGNOSIS — Z90.11 ACQUIRED ABSENCE OF RIGHT BREAST AND NIPPLE: Chronic | ICD-10-CM

## 2020-07-29 DIAGNOSIS — Z17.0 ESTROGEN RECEPTOR POSITIVE STATUS [ER+]: ICD-10-CM

## 2020-07-31 DIAGNOSIS — Z79.899 ENCOUNTER FOR THERAPEUTIC DRUG LVL MONITORING: ICD-10-CM

## 2020-07-31 DIAGNOSIS — Z51.81 ENCOUNTER FOR THERAPEUTIC DRUG LVL MONITORING: ICD-10-CM

## 2020-07-31 DIAGNOSIS — Z79.899 OTHER LONG TERM (CURRENT) DRUG THERAPY: ICD-10-CM

## 2020-07-31 DIAGNOSIS — L27.0 GENERALIZED SKIN ERUPTION DUE TO DRUGS AND MEDICAMENTS TAKEN INTERNALLY: ICD-10-CM

## 2020-08-03 ENCOUNTER — RESULT REVIEW (OUTPATIENT)
Age: 55
End: 2020-08-03

## 2020-08-03 ENCOUNTER — APPOINTMENT (OUTPATIENT)
Age: 55
End: 2020-08-03
Payer: COMMERCIAL

## 2020-08-03 VITALS
HEIGHT: 69 IN | TEMPERATURE: 98.4 F | RESPIRATION RATE: 16 BRPM | HEART RATE: 89 BPM | SYSTOLIC BLOOD PRESSURE: 103 MMHG | WEIGHT: 205 LBS | BODY MASS INDEX: 30.36 KG/M2 | DIASTOLIC BLOOD PRESSURE: 69 MMHG

## 2020-08-03 LAB
BASOPHILS # BLD AUTO: 0.08 K/UL — SIGNIFICANT CHANGE UP (ref 0–0.2)
BASOPHILS NFR BLD AUTO: 1 % — SIGNIFICANT CHANGE UP (ref 0–2)
EOSINOPHIL # BLD AUTO: 0.21 K/UL — SIGNIFICANT CHANGE UP (ref 0–0.5)
EOSINOPHIL NFR BLD AUTO: 2.7 % — SIGNIFICANT CHANGE UP (ref 0–6)
HCT VFR BLD CALC: 36.2 % — SIGNIFICANT CHANGE UP (ref 34.5–45)
HGB BLD-MCNC: 11.9 G/DL — SIGNIFICANT CHANGE UP (ref 11.5–15.5)
IMM GRANULOCYTES NFR BLD AUTO: 0.1 % — SIGNIFICANT CHANGE UP (ref 0–1.5)
LYMPHOCYTES # BLD AUTO: 1.51 K/UL — SIGNIFICANT CHANGE UP (ref 1–3.3)
LYMPHOCYTES # BLD AUTO: 19.5 % — SIGNIFICANT CHANGE UP (ref 13–44)
MCHC RBC-ENTMCNC: 28.7 PG — SIGNIFICANT CHANGE UP (ref 27–34)
MCHC RBC-ENTMCNC: 32.9 GM/DL — SIGNIFICANT CHANGE UP (ref 32–36)
MCV RBC AUTO: 87.4 FL — SIGNIFICANT CHANGE UP (ref 80–100)
MONOCYTES # BLD AUTO: 0.75 K/UL — SIGNIFICANT CHANGE UP (ref 0–0.9)
MONOCYTES NFR BLD AUTO: 9.7 % — SIGNIFICANT CHANGE UP (ref 2–14)
NEUTROPHILS # BLD AUTO: 5.19 K/UL — SIGNIFICANT CHANGE UP (ref 1.8–7.4)
NEUTROPHILS NFR BLD AUTO: 67 % — SIGNIFICANT CHANGE UP (ref 43–77)
NRBC # BLD: 0 /100 WBCS — SIGNIFICANT CHANGE UP (ref 0–0)
PLATELET # BLD AUTO: 362 K/UL — SIGNIFICANT CHANGE UP (ref 150–400)
RBC # BLD: 4.14 M/UL — SIGNIFICANT CHANGE UP (ref 3.8–5.2)
RBC # FLD: 14.3 % — SIGNIFICANT CHANGE UP (ref 10.3–14.5)
WBC # BLD: 7.75 K/UL — SIGNIFICANT CHANGE UP (ref 3.8–10.5)
WBC # FLD AUTO: 7.75 K/UL — SIGNIFICANT CHANGE UP (ref 3.8–10.5)

## 2020-08-03 PROCEDURE — 99214 OFFICE O/P EST MOD 30 MIN: CPT

## 2020-08-04 LAB
24R-OH-CALCIDIOL SERPL-MCNC: 30.2 NG/ML — SIGNIFICANT CHANGE UP (ref 30–80)
ALBUMIN SERPL ELPH-MCNC: 4.4 G/DL — SIGNIFICANT CHANGE UP (ref 3.3–5)
ALP SERPL-CCNC: 48 U/L — SIGNIFICANT CHANGE UP (ref 40–120)
ALT FLD-CCNC: 12 U/L — SIGNIFICANT CHANGE UP (ref 10–45)
ANION GAP SERPL CALC-SCNC: 14 MMOL/L — SIGNIFICANT CHANGE UP (ref 5–17)
AST SERPL-CCNC: 16 U/L — SIGNIFICANT CHANGE UP (ref 10–40)
BILIRUB SERPL-MCNC: 0.3 MG/DL — SIGNIFICANT CHANGE UP (ref 0.2–1.2)
BUN SERPL-MCNC: 13 MG/DL — SIGNIFICANT CHANGE UP (ref 7–23)
CALCIUM SERPL-MCNC: 9.6 MG/DL — SIGNIFICANT CHANGE UP (ref 8.4–10.5)
CANCER AG27-29 SERPL-ACNC: 7.9 U/ML — SIGNIFICANT CHANGE UP (ref 0–38.6)
CHLORIDE SERPL-SCNC: 104 MMOL/L — SIGNIFICANT CHANGE UP (ref 96–108)
CO2 SERPL-SCNC: 22 MMOL/L — SIGNIFICANT CHANGE UP (ref 22–31)
CREAT SERPL-MCNC: 0.74 MG/DL — SIGNIFICANT CHANGE UP (ref 0.5–1.3)
GLUCOSE SERPL-MCNC: 95 MG/DL — SIGNIFICANT CHANGE UP (ref 70–99)
POTASSIUM SERPL-MCNC: 4.1 MMOL/L — SIGNIFICANT CHANGE UP (ref 3.5–5.3)
POTASSIUM SERPL-SCNC: 4.1 MMOL/L — SIGNIFICANT CHANGE UP (ref 3.5–5.3)
PROT SERPL-MCNC: 6.6 G/DL — SIGNIFICANT CHANGE UP (ref 6–8.3)
SODIUM SERPL-SCNC: 140 MMOL/L — SIGNIFICANT CHANGE UP (ref 135–145)

## 2020-08-04 NOTE — REVIEW OF SYSTEMS
[Joint Pain] : joint pain [Skin Rash] : no skin rash [Skin Wound] : no skin wound [Negative] : Integumentary [FreeTextEntry9] : mild arthritic pain hand joints

## 2020-08-04 NOTE — RESULTS/DATA
[FreeTextEntry1] : I reviewed today's and recent blood work results with patient.\par \par 5/28/2020:\par WBC 6, hemoglobin 12.5 g/dL, hematocrit 40.1%, platelet 333,000 \par \par 2/18/20:\par WBC 13.54 K, hemoglobin 11 g/ dL, hematocrit  34.3 %, platelet  444,000\par \par 1/7/20:\par WBC 10.56 K, hemoglobin 11.5 g/ dL, hematocrit 35.6 %, platelet  504,000\par \par 11/5/19:\par WBC 14.62, hemoglobin 12.7 g/dL, hematocrit 39.1%, platelets 481 pounds\par \par 9/13/19: \par WBC 16.23, hemoglobin 10.7 g/dL, hematocrit 33%, platelet 343,000

## 2020-08-04 NOTE — HISTORY OF PRESENT ILLNESS
[T: ___] : T[unfilled] [Disease: _____________________] : Disease: [unfilled] [AJCC Stage: ____] : AJCC Stage: [unfilled] [N: ___] : N[unfilled] [de-identified] : 54 F with history right breast cancer at age 37, s/p right mastectomy with saline implant reconstruction, and nipple reconstruction with left nipple graft, s/p adjuvant chemotherapy x 6 months (Dr. Rene White), referred for medical oncologic consultation after diagnosis of left breast carcinoma in September 2019. \par \par CASE SYNOPSIS: \par 5/29/03- segmental resection right breast for moderately differentiated infiltrating ductal carcinoma; margin involved by infiltrating tumor.\par \par 7/10/03- right total mastectomy with saline implant reconstruction( Meghna Townsend/ Benny Grover). Received adjuvant systemic chemotherapy ( records to be obtained), under the care of Dr. Don White.\par \par 10/2018- reports lower left breast itching.\par \par 10/31/18- mammogram/ breast US: no suspicious lesions, no evidence of malignancy, intramammary lymph node 2:00 left breast.\par \par 6/24/19 MRI breast – right implant intact; left breast 5:00 N8, 2 adjacent masses 1, and 1.5 cm; abnormal clumped enhancement extending from masses medially, multiple additional masses throughout left breast: 6:00 N4 8 mm, 3:00 N6 1 cm, 2:00 N6 7 mm, 12:00 N6 9 mm, 10:00 N4 8 mm. Multiple small axillary lymph nodes.\par \par 6/25/19- DEXA scan: low bone mass based on left femoral neck T score (-1.2 SD from the mean).\par \par 7/9/19: left breast US and US-guided core biopsy; left axilla with sonographically normal lymph node. \par Left 4-5:00 N6 9 x 7 x 10 mm and irregular inferior mass 18 x 9 x 14 mm. Pathology: \par Left 4-5:00 superior- infiltrating ductal carcinoma, SBR 6/9, ER90%, MS 90%, Her 2 negative. \par Left 4-5:00 inferior - infiltrating ductal carcinoma, SBR 6/9, ER90%, MS 70%, Her 2 negative.\par \par 9/12/19: left mastectomy with sentinel node biopsy and removal of both implants with bilateral DMITRI flap reconstruction. \par Pathology: left breast with 2 foci of invasive ductal carcinoma 2.5 cm and 1.2 cm , SBR 6/9, with foci of DCIS in UOQ, + LVI and PNI, margins negative, 1 / 2 SLN positive ( 8 mm ; no extracapsular extension), 2 more negative non- sentinel LN,  right and left internal mammary nodes negative.(pT2, pN1, Mx = stage IIB)\par \par 10/1/19- develops venous thrombosis in the basilic vein, superficial, extended for at least 10 cm.  LYMPHA procedure postponed. Started on anticoagulation.\par \par 10/2/19: PET/CT consistent with FDG avidity associated with the reconstructed bilateral breast in the anterior abdominal wall; no FDG avid local regional or distant metastatic disease.\par \par 10/17/19 – chemo- education session.\par \par 11/5/19- 2/18/20- completes 6 cycles Docetaxel/ Cyclophosphamide.\par \par 7/10/2020: Bone scan–no radionuclide evidence of osseous metastases. \par \par 7/22/2020: CTA–negative for pulmonary embolism; left upper lobe peripheral groundglass opacity, possibly infectious or inflammatory.\par \par \par 4/6- 5/8/20-  XRT left chest wall.\par \par 5/8/20- starts anastrozole.\par  [de-identified] : infiltrating ductal carcinoma [de-identified] : ER 90%, PA 90%, Her 2 negative [FreeTextEntry1] : docetaxel/ cyclophosphamide\par Anastrozole–started 5/8/2020 [de-identified] : Short-term follow up; last seen in office in 2020. Ms. RODRIGUEZ was recently evaluated by breast surgeon due to persistent burning discomfort along the left inferior reconstructed breast.  Had a CT angiogram last week–negative for PE.  Continues to comply with hormonal treatment (anastrozole); experiencing mild arthritic pain ( " I feel like an old woman") ;had negative bone scan on 7/10/2020. Laboratory tests consistent with normal CBC; pending serum tumor marker (CA 27-29). 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 In November patient has follow up appointment with plastic surgery ( Dr. Rodriguez) for nipple reconstruction.

## 2020-08-04 NOTE — ASSESSMENT
[FreeTextEntry1] : Will RTC in 6 months. Ms. RODRIGUEZ  was advised to contact the office should she have any additional questions or concerns in the interim.

## 2020-08-04 NOTE — PHYSICAL EXAM
[Fully active, able to carry on all pre-disease performance without restriction] : Status 0 - Fully active, able to carry on all pre-disease performance without restriction [Normal] : full range of motion and no deformities appreciated [de-identified] : bilateral mastectomy ; bilateral DMITRI reconstruction, scars well healed. [de-identified] : transverse abdominal incision healing; right sided wound opening with minimal serosanguineous secretions; left sided drain catheter in place, draining small amount of serosanguineous fluid [de-identified] : Left chest wall and left breast erythema secondary to XRT

## 2020-10-22 ENCOUNTER — OUTPATIENT (OUTPATIENT)
Dept: OUTPATIENT SERVICES | Facility: HOSPITAL | Age: 55
LOS: 1 days | Discharge: HOME | End: 2020-10-22

## 2020-10-22 DIAGNOSIS — Z17.0 ESTROGEN RECEPTOR POSITIVE STATUS [ER+]: ICD-10-CM

## 2020-10-22 DIAGNOSIS — Z90.11 ACQUIRED ABSENCE OF RIGHT BREAST AND NIPPLE: Chronic | ICD-10-CM

## 2020-10-22 DIAGNOSIS — Z98.890 OTHER SPECIFIED POSTPROCEDURAL STATES: Chronic | ICD-10-CM

## 2020-10-22 DIAGNOSIS — Z98.82 BREAST IMPLANT STATUS: Chronic | ICD-10-CM

## 2020-10-22 DIAGNOSIS — C50.512 MALIGNANT NEOPLASM OF LOWER-OUTER QUADRANT OF LEFT FEMALE BREAST: ICD-10-CM

## 2020-10-23 ENCOUNTER — APPOINTMENT (OUTPATIENT)
Age: 55
End: 2020-10-23
Payer: COMMERCIAL

## 2020-10-23 ENCOUNTER — APPOINTMENT (OUTPATIENT)
Dept: BREAST CENTER | Facility: CLINIC | Age: 55
End: 2020-10-23
Payer: COMMERCIAL

## 2020-10-23 VITALS — DIASTOLIC BLOOD PRESSURE: 74 MMHG | SYSTOLIC BLOOD PRESSURE: 116 MMHG

## 2020-10-23 VITALS
DIASTOLIC BLOOD PRESSURE: 65 MMHG | HEART RATE: 89 BPM | BODY MASS INDEX: 30.51 KG/M2 | WEIGHT: 206 LBS | SYSTOLIC BLOOD PRESSURE: 110 MMHG | HEIGHT: 69 IN

## 2020-10-23 VITALS
HEART RATE: 98 BPM | HEIGHT: 69 IN | TEMPERATURE: 97.4 F | WEIGHT: 208 LBS | BODY MASS INDEX: 30.81 KG/M2 | SYSTOLIC BLOOD PRESSURE: 116 MMHG | RESPIRATION RATE: 14 BRPM | DIASTOLIC BLOOD PRESSURE: 74 MMHG

## 2020-10-23 DIAGNOSIS — Z85.3 PERSONAL HISTORY OF MALIGNANT NEOPLASM OF BREAST: ICD-10-CM

## 2020-10-23 PROCEDURE — 99214 OFFICE O/P EST MOD 30 MIN: CPT

## 2020-10-23 PROCEDURE — 99072 ADDL SUPL MATRL&STAF TM PHE: CPT

## 2020-10-23 PROCEDURE — 99213 OFFICE O/P EST LOW 20 MIN: CPT

## 2020-10-23 NOTE — REVIEW OF SYSTEMS
[Joint Pain] : joint pain [Negative] : Neurological [Skin Rash] : no skin rash [Skin Wound] : no skin wound [FreeTextEntry9] : mild arthritic pain hand joints

## 2020-10-23 NOTE — PHYSICAL EXAM
[Fully active, able to carry on all pre-disease performance without restriction] : Status 0 - Fully active, able to carry on all pre-disease performance without restriction [Normal] : normal appearance, no rash, nodules, vesicles, ulcers, erythema [de-identified] : bilateral mastectomy ; bilateral DMITRI reconstruction, scars well healed.No nipple reconstruction [de-identified] : transverse abdominal incision healed; right sided wound opening with minimal serosanguineous secretions; left sided drain catheter in place, draining small amount of serosanguineous fluid

## 2020-10-23 NOTE — HISTORY OF PRESENT ILLNESS
[FreeTextEntry1] : This is a 55 year old  female who has a history of right breast cancer at age 37 treated with mastectomy/saline implant  reconstruction (Dr. Townsend/Reilly) and 6 months of chemotherapy (Dr. White- one drug was "red").  The right nipple was reconstructed with a left nipple graft. \par \par She complained of left breast itching in the lower breast since October 2018.  She had a mammogram and ultrasound that were negative.  She changed gynecologists to Dr. Faust in June and still complained of itching, so he sent her for an MRI. The MRI showed a number of abnormalities and a biopsy of 2 adjacent areas confirmed cancer. She has always had small pimple like lesions of her left nipple. She had a left breast saline implant that had been ruptured for at least 2 years.\par \par She underwent a left mastectomy with sentinel node biopsy and removal of both implants with bilateral DMITRI flap reconstructions on 9/12/2019 (Dr. Rodriguez).  She had two areas of cancer up to 2.5 cm.  One sentinel node was positive on final pathology.  ER 90% [place] 70% Her 2 neg, Oncotype score was 24 (19%).\par \par She was scheduled for a left axillary dissection with LYMPHA procedure.  She developed a tender cord in her right forearm and had an extensive superficial phlebitis of her basilic vein.  She was started on Xarelto and the surgery was canceled.\par \par She received chemotherapy with TCx6 followed by radiation therapy.  She is on anastrazole.\par \par She still has stiffness and pain in the outer left reconstruction going to her back. (A bone scan in June was negative)\par \par She is scheduled for the next phase of her reconstruction in November.\par \par \par

## 2020-10-23 NOTE — PHYSICAL EXAM
[EOMI] : extra ocular movement intact [Sclera nonicteric] : sclera nonicteric [Supple] : supple [No Supraclavicular Adenopathy] : no supraclavicular adenopathy [No Cervical Adenopathy] : no cervical adenopathy [No Thyromegaly] : no thyromegaly [Clear to Auscultation Bilat] : clear to auscultation bilaterally [Examined in the supine and seated position] : examined in the supine and seated position [No dominant masses] : no dominant masses in right breast  [No dominant masses] : no dominant masses left breast [No Axillary Lymphadenopathy] : no left axillary lymphadenopathy [de-identified] : Circular skin islands.  Medial and lateral scars. [de-identified] : Circular skin islands. Medial and lateral scars. Mild radiation change.\par  [de-identified] : Transverse scar.

## 2020-10-23 NOTE — HISTORY OF PRESENT ILLNESS
[Disease: _____________________] : Disease: [unfilled] [T: ___] : T[unfilled] [N: ___] : N[unfilled] [AJCC Stage: ____] : AJCC Stage: [unfilled] [de-identified] : 55 F with history right breast cancer at age 37, s/p right mastectomy with saline implant reconstruction, and nipple reconstruction with left nipple graft, s/p adjuvant chemotherapy x 6 months (Dr. Rene White), referred for medical oncologic consultation after diagnosis of left breast carcinoma in September 2019. \par \par CASE SYNOPSIS: \par 5/29/03- segmental resection right breast for moderately differentiated infiltrating ductal carcinoma; margin involved by infiltrating tumor.\par \par 7/10/03- right total mastectomy with saline implant reconstruction( Meghna Townsend/ Benny Grover). Received adjuvant systemic chemotherapy ( records to be obtained), under the care of Dr. Don White.\par \par 10/2018- reports lower left breast itching.\par \par 10/31/18- mammogram/ breast US: no suspicious lesions, no evidence of malignancy, intramammary lymph node 2:00 left breast.\par \par 6/24/19 MRI breast – right implant intact; left breast 5:00 N8, 2 adjacent masses 1, and 1.5 cm; abnormal clumped enhancement extending from masses medially, multiple additional masses throughout left breast: 6:00 N4 8 mm, 3:00 N6 1 cm, 2:00 N6 7 mm, 12:00 N6 9 mm, 10:00 N4 8 mm. Multiple small axillary lymph nodes.\par \par 6/25/19- DEXA scan: low bone mass based on left femoral neck T score (-1.2 SD from the mean).\par \par 7/9/19: left breast US and US-guided core biopsy; left axilla with sonographically normal lymph node. \par Left 4-5:00 N6 9 x 7 x 10 mm and irregular inferior mass 18 x 9 x 14 mm. Pathology: \par Left 4-5:00 superior- infiltrating ductal carcinoma, SBR 6/9, ER90%, [place] 90%, Her 2 negative. \par Left 4-5:00 inferior - infiltrating ductal carcinoma, SBR 6/9, ER90%, DE 70%, Her 2 negative.\par \par 9/12/19: left mastectomy with sentinel node biopsy and removal of both implants with bilateral DMITRI flap reconstruction. \par Pathology: left breast with 2 foci of invasive ductal carcinoma 2.5 cm and 1.2 cm , SBR 6/9, with foci of DCIS in UOQ, + LVI and PNI, margins negative, 1 / 2 SLN positive ( 8 mm ; no extracapsular extension), 2 more negative non- sentinel LN,  right and left internal mammary nodes negative.(pT2, pN1, Mx = stage IIB)\par \par 10/1/19- develops venous thrombosis in the basilic vein, superficial, extended for at least 10 cm.  LYMPHA procedure postponed. Started on anticoagulation.\par \par 10/2/19: PET/CT consistent with FDG avidity associated with the reconstructed bilateral breast in the anterior abdominal wall; no FDG avid local regional or distant metastatic disease.\par \par 10/17/19 – chemo- education session.\par \par 11/5/19- 2/18/20- completes 6 cycles Docetaxel/ Cyclophosphamide.\par \par 4/6- 5/8/20-  XRT left chest wall.\par \par 5/8/20- starts anastrozole.\par \par 7/10/2020: Bone scan–no radionuclide evidence of osseous metastases. \par \par 7/22/2020: CTA–negative for pulmonary embolism; left upper lobe peripheral groundglass opacity, possibly infectious or inflammatory.\par \par \par \par  [de-identified] : infiltrating ductal carcinoma [de-identified] : ER 90%, AL 90%, Her 2 negative [FreeTextEntry1] : docetaxel/ cyclophosphamide\par Anastrozole–started 5/8/2020 [de-identified] : Last seen in office in August 2020; started anastrozole in May 2020, and continues to comply with antiestrogen therapy.  Reporting mild arthritis.  She has return to work ( ) since June 2020; denies any physical restrictions.  She still presents left inferior breast discomfort after breast reconstruction.  Pending follow-up with plastic surgery for nipple reconstruction ( 11/2/20) with Dr. Rodriguez. Laboratory tests consistent with normal CBC and serum tumor marker (CA 27-29).

## 2020-11-30 ENCOUNTER — OUTPATIENT (OUTPATIENT)
Dept: OUTPATIENT SERVICES | Facility: HOSPITAL | Age: 55
LOS: 1 days | Discharge: HOME | End: 2020-11-30

## 2020-11-30 DIAGNOSIS — Z90.11 ACQUIRED ABSENCE OF RIGHT BREAST AND NIPPLE: Chronic | ICD-10-CM

## 2020-11-30 DIAGNOSIS — Z98.890 OTHER SPECIFIED POSTPROCEDURAL STATES: Chronic | ICD-10-CM

## 2020-11-30 DIAGNOSIS — Z98.82 BREAST IMPLANT STATUS: Chronic | ICD-10-CM

## 2020-11-30 DIAGNOSIS — C50.512 MALIGNANT NEOPLASM OF LOWER-OUTER QUADRANT OF LEFT FEMALE BREAST: ICD-10-CM

## 2020-11-30 DIAGNOSIS — Z17.0 ESTROGEN RECEPTOR POSITIVE STATUS [ER+]: ICD-10-CM

## 2020-12-07 ENCOUNTER — APPOINTMENT (OUTPATIENT)
Age: 55
End: 2020-12-07

## 2020-12-07 VITALS
HEART RATE: 96 BPM | BODY MASS INDEX: 29.83 KG/M2 | TEMPERATURE: 96 F | WEIGHT: 202 LBS | SYSTOLIC BLOOD PRESSURE: 118 MMHG | DIASTOLIC BLOOD PRESSURE: 72 MMHG

## 2021-01-16 ENCOUNTER — OUTPATIENT (OUTPATIENT)
Dept: OUTPATIENT SERVICES | Facility: HOSPITAL | Age: 56
LOS: 1 days | Discharge: HOME | End: 2021-01-16

## 2021-01-16 DIAGNOSIS — Z98.890 OTHER SPECIFIED POSTPROCEDURAL STATES: Chronic | ICD-10-CM

## 2021-01-16 DIAGNOSIS — Z98.82 BREAST IMPLANT STATUS: Chronic | ICD-10-CM

## 2021-01-16 DIAGNOSIS — C50.512 MALIGNANT NEOPLASM OF LOWER-OUTER QUADRANT OF LEFT FEMALE BREAST: ICD-10-CM

## 2021-01-16 DIAGNOSIS — Z17.0 ESTROGEN RECEPTOR POSITIVE STATUS [ER+]: ICD-10-CM

## 2021-01-16 DIAGNOSIS — Z90.11 ACQUIRED ABSENCE OF RIGHT BREAST AND NIPPLE: Chronic | ICD-10-CM

## 2021-01-21 ENCOUNTER — APPOINTMENT (OUTPATIENT)
Age: 56
End: 2021-01-21

## 2021-01-21 VITALS
DIASTOLIC BLOOD PRESSURE: 76 MMHG | TEMPERATURE: 96.5 F | WEIGHT: 204.6 LBS | SYSTOLIC BLOOD PRESSURE: 120 MMHG | HEART RATE: 105 BPM | BODY MASS INDEX: 30.21 KG/M2

## 2021-03-13 ENCOUNTER — OUTPATIENT (OUTPATIENT)
Dept: OUTPATIENT SERVICES | Facility: HOSPITAL | Age: 56
LOS: 1 days | Discharge: HOME | End: 2021-03-13

## 2021-03-13 DIAGNOSIS — Z98.890 OTHER SPECIFIED POSTPROCEDURAL STATES: Chronic | ICD-10-CM

## 2021-03-13 DIAGNOSIS — Z90.11 ACQUIRED ABSENCE OF RIGHT BREAST AND NIPPLE: Chronic | ICD-10-CM

## 2021-03-13 DIAGNOSIS — Z98.82 BREAST IMPLANT STATUS: Chronic | ICD-10-CM

## 2021-03-13 DIAGNOSIS — Z17.0 ESTROGEN RECEPTOR POSITIVE STATUS [ER+]: ICD-10-CM

## 2021-03-13 DIAGNOSIS — C50.512 MALIGNANT NEOPLASM OF LOWER-OUTER QUADRANT OF LEFT FEMALE BREAST: ICD-10-CM

## 2021-03-15 ENCOUNTER — APPOINTMENT (OUTPATIENT)
Dept: OBGYN | Facility: CLINIC | Age: 56
End: 2021-03-15
Payer: COMMERCIAL

## 2021-03-15 PROCEDURE — 99072 ADDL SUPL MATRL&STAF TM PHE: CPT

## 2021-03-15 PROCEDURE — 99396 PREV VISIT EST AGE 40-64: CPT

## 2021-03-15 NOTE — HISTORY OF PRESENT ILLNESS
[FreeTextEntry1] : Patient is a 55-year-old female presents for a routine annual gynecologic examination. Patient with a personal history of breast cancer status post bilateral mastectomy and reconstruction. Patient has no complaints. Patient sees her breast surgeon and oncologist every 6 months. Patient's last bone density test was June 2019

## 2021-03-15 NOTE — PHYSICAL EXAM
[Appropriately responsive] : appropriately responsive [Alert] : alert [No Acute Distress] : no acute distress [No Lymphadenopathy] : no lymphadenopathy [Regular Rate Rhythm] : regular rate rhythm [No Murmurs] : no murmurs [Clear to Auscultation B/L] : clear to auscultation bilaterally [Soft] : soft [Non-tender] : non-tender [Non-distended] : non-distended [No HSM] : No HSM [No Lesions] : no lesions [No Mass] : no mass [Oriented x3] : oriented x3 [FreeTextEntry6] : bilateral breast reconstruction, no skin changes, no masses palpated, axilla negative there is no lymphadenopathy [Examination Of The Breasts] : a normal appearance [No Masses] : no breast masses were palpable [Vulvar Atrophy] : vulvar atrophy [Labia Majora] : normal [Labia Minora] : normal [Atrophy] : atrophy [Normal] : normal [Uterine Adnexae] : normal [No Tenderness] : no tenderness [Nl Sphincter Tone] : normal sphincter tone [FreeTextEntry5] : pap done [FreeTextEntry9] : hemoccult negative

## 2021-03-16 ENCOUNTER — APPOINTMENT (OUTPATIENT)
Age: 56
End: 2021-03-16
Payer: COMMERCIAL

## 2021-03-16 ENCOUNTER — RESULT REVIEW (OUTPATIENT)
Age: 56
End: 2021-03-16

## 2021-03-16 VITALS
HEIGHT: 69 IN | TEMPERATURE: 97.8 F | WEIGHT: 207 LBS | SYSTOLIC BLOOD PRESSURE: 103 MMHG | BODY MASS INDEX: 30.66 KG/M2 | HEART RATE: 89 BPM | DIASTOLIC BLOOD PRESSURE: 70 MMHG | RESPIRATION RATE: 16 BRPM

## 2021-03-16 LAB
24R-OH-CALCIDIOL SERPL-MCNC: 21 NG/ML — LOW (ref 30–80)
ALBUMIN SERPL ELPH-MCNC: 4.2 G/DL — SIGNIFICANT CHANGE UP (ref 3.3–5)
ALP SERPL-CCNC: 59 U/L — SIGNIFICANT CHANGE UP (ref 40–120)
ALT FLD-CCNC: 12 U/L — SIGNIFICANT CHANGE UP (ref 10–45)
ANION GAP SERPL CALC-SCNC: 10 MMOL/L — SIGNIFICANT CHANGE UP (ref 5–17)
AST SERPL-CCNC: 12 U/L — SIGNIFICANT CHANGE UP (ref 10–40)
BASOPHILS # BLD AUTO: 0.1 K/UL — SIGNIFICANT CHANGE UP (ref 0–0.2)
BASOPHILS NFR BLD AUTO: 1.7 % — SIGNIFICANT CHANGE UP (ref 0–2)
BILIRUB SERPL-MCNC: 0.3 MG/DL — SIGNIFICANT CHANGE UP (ref 0.2–1.2)
BUN SERPL-MCNC: 14 MG/DL — SIGNIFICANT CHANGE UP (ref 7–23)
CALCIUM SERPL-MCNC: 9.8 MG/DL — SIGNIFICANT CHANGE UP (ref 8.4–10.5)
CHLORIDE SERPL-SCNC: 104 MMOL/L — SIGNIFICANT CHANGE UP (ref 96–108)
CO2 SERPL-SCNC: 27 MMOL/L — SIGNIFICANT CHANGE UP (ref 22–31)
CREAT SERPL-MCNC: 0.81 MG/DL — SIGNIFICANT CHANGE UP (ref 0.5–1.3)
EOSINOPHIL # BLD AUTO: 0.17 K/UL — SIGNIFICANT CHANGE UP (ref 0–0.5)
EOSINOPHIL NFR BLD AUTO: 2.8 % — SIGNIFICANT CHANGE UP (ref 0–6)
GLUCOSE SERPL-MCNC: 91 MG/DL — SIGNIFICANT CHANGE UP (ref 70–99)
HCT VFR BLD CALC: 40.1 % — SIGNIFICANT CHANGE UP (ref 34.5–45)
HGB BLD-MCNC: 13.2 G/DL — SIGNIFICANT CHANGE UP (ref 11.5–15.5)
HPV HIGH+LOW RISK DNA PNL CVX: NOT DETECTED
IMM GRANULOCYTES NFR BLD AUTO: 0.3 % — SIGNIFICANT CHANGE UP (ref 0–1.5)
LYMPHOCYTES # BLD AUTO: 1.53 K/UL — SIGNIFICANT CHANGE UP (ref 1–3.3)
LYMPHOCYTES # BLD AUTO: 25.5 % — SIGNIFICANT CHANGE UP (ref 13–44)
MCHC RBC-ENTMCNC: 29.1 PG — SIGNIFICANT CHANGE UP (ref 27–34)
MCHC RBC-ENTMCNC: 32.9 GM/DL — SIGNIFICANT CHANGE UP (ref 32–36)
MCV RBC AUTO: 88.3 FL — SIGNIFICANT CHANGE UP (ref 80–100)
MONOCYTES # BLD AUTO: 0.58 K/UL — SIGNIFICANT CHANGE UP (ref 0–0.9)
MONOCYTES NFR BLD AUTO: 9.7 % — SIGNIFICANT CHANGE UP (ref 2–14)
NEUTROPHILS # BLD AUTO: 3.61 K/UL — SIGNIFICANT CHANGE UP (ref 1.8–7.4)
NEUTROPHILS NFR BLD AUTO: 60 % — SIGNIFICANT CHANGE UP (ref 43–77)
NRBC # BLD: 0 /100 WBCS — SIGNIFICANT CHANGE UP (ref 0–0)
PLATELET # BLD AUTO: 322 K/UL — SIGNIFICANT CHANGE UP (ref 150–400)
POTASSIUM SERPL-MCNC: 4.6 MMOL/L — SIGNIFICANT CHANGE UP (ref 3.5–5.3)
POTASSIUM SERPL-SCNC: 4.6 MMOL/L — SIGNIFICANT CHANGE UP (ref 3.5–5.3)
PROT SERPL-MCNC: 6.4 G/DL — SIGNIFICANT CHANGE UP (ref 6–8.3)
RBC # BLD: 4.54 M/UL — SIGNIFICANT CHANGE UP (ref 3.8–5.2)
RBC # FLD: 13.9 % — SIGNIFICANT CHANGE UP (ref 10.3–14.5)
SODIUM SERPL-SCNC: 141 MMOL/L — SIGNIFICANT CHANGE UP (ref 135–145)
WBC # BLD: 6.01 K/UL — SIGNIFICANT CHANGE UP (ref 3.8–10.5)
WBC # FLD AUTO: 6.01 K/UL — SIGNIFICANT CHANGE UP (ref 3.8–10.5)

## 2021-03-16 PROCEDURE — 99214 OFFICE O/P EST MOD 30 MIN: CPT

## 2021-03-16 NOTE — PHYSICAL EXAM
[Fully active, able to carry on all pre-disease performance without restriction] : Status 0 - Fully active, able to carry on all pre-disease performance without restriction [Normal] : normal appearance, no rash, nodules, vesicles, ulcers, erythema [de-identified] : bilateral mastectomy ; bilateral DMITRI reconstruction, scars well healed.No nipple reconstruction; left breast slightly larger than the right [de-identified] : transverse abdominal incision healed; right sided wound opening with minimal serosanguineous secretions; left sided drain catheter in place, draining small amount of serosanguineous fluid

## 2021-03-16 NOTE — HISTORY OF PRESENT ILLNESS
[Disease: _____________________] : Disease: [unfilled] [T: ___] : T[unfilled] [N: ___] : N[unfilled] [AJCC Stage: ____] : AJCC Stage: [unfilled] [de-identified] : 55 F with history right breast cancer at age 37, s/p right mastectomy with saline implant reconstruction, and nipple reconstruction with left nipple graft, s/p adjuvant chemotherapy x 6 months (Dr. Rene White), referred for medical oncologic consultation after diagnosis of left breast carcinoma in September 2019. \par \par CASE SYNOPSIS: \par 5/29/03- segmental resection right breast for moderately differentiated infiltrating ductal carcinoma; margin involved by infiltrating tumor.\par \par 7/10/03- right total mastectomy with saline implant reconstruction( Jak Townsend/ Benny Grover). Received adjuvant systemic chemotherapy ( records to be obtained), under the care of Dr. Don White.\par \par 10/2018- reports lower left breast itching.\par \par 10/31/18- mammogram/ breast US: no suspicious lesions, no evidence of malignancy, intramammary lymph node 2:00 left breast.\par \par 6/24/19 MRI breast – right implant intact; left breast 5:00 N8, 2 adjacent masses 1, and 1.5 cm; abnormal clumped enhancement extending from masses medially, multiple additional masses throughout left breast: 6:00 N4 8 mm, 3:00 N6 1 cm, 2:00 N6 7 mm, 12:00 N6 9 mm, 10:00 N4 8 mm. Multiple small axillary lymph nodes.\par \par 6/25/19- DEXA scan: low bone mass based on left femoral neck T score (-1.2 SD from the mean).\par \par 7/9/19: left breast US and US-guided core biopsy; left axilla with sonographically normal lymph node. \par Left 4-5:00 N6 9 x 7 x 10 mm and irregular inferior mass 18 x 9 x 14 mm. Pathology: \par Left 4-5:00 superior- infiltrating ductal carcinoma, SBR 6/9, ER90%, ID 90%, Her 2 negative. \par Left 4-5:00 inferior - infiltrating ductal carcinoma, SBR 6/9, ER90%, ID 70%, Her 2 negative.\par \par 9/12/19: left mastectomy with sentinel node biopsy and removal of both implants with bilateral DMITRI flap reconstruction. \par Pathology: left breast with 2 foci of invasive ductal carcinoma 2.5 cm and 1.2 cm , SBR 6/9, with foci of DCIS in UOQ, + LVI and PNI, margins negative, 1 / 2 SLN positive ( 8 mm ; no extracapsular extension), 2 more negative non- sentinel LN,  right and left internal mammary nodes negative.(pT2, pN1, Mx = stage IIB)\par \par 10/1/19- develops venous thrombosis in the basilic vein, superficial, extended for at least 10 cm.  LYMPHA procedure postponed. Started on anticoagulation.\par \par 10/2/19: PET/CT consistent with FDG avidity associated with the reconstructed bilateral breast in the anterior abdominal wall; no FDG avid local regional or distant metastatic disease.\par \par 10/17/19 – chemo- education session.\par \par 11/5/19- 2/18/20- completes 6 cycles Docetaxel/ Cyclophosphamide.\par \par 4/6- 5/8/20-  XRT left chest wall.\par \par 5/8/20- starts anastrozole.\par \par 7/10/2020: Bone scan–no radionuclide evidence of osseous metastases. \par \par 7/22/2020: CTA–negative for pulmonary embolism; left upper lobe peripheral groundglass opacity, possibly infectious or inflammatory.\par \par \par \par  [de-identified] : infiltrating ductal carcinoma [de-identified] : ER 90%, [place] 90%, Her 2 negative [FreeTextEntry1] : docetaxel/ cyclophosphamide\par Anastrozole–started 5/8/2020 [de-identified] : Biannual exam; on anastrozole for the past year, with some minor arthralgia reported.  Continues to work full-time.  Ms. RODRIGUEZ did not have bilateral nipple reconstruction (Dr. Rodriguez) as scheduled for November 2020, it is expected to have a follow-up in April 2021 with plastic surgery to discuss nipple reconstruction and removal of [id number].  Patient is minimally symptomatic; she complains of mild paresthesias in the right hand and right lower extremity, mostly when overusing her hand to type.  Blood work consistent with normal serum tumor marker CA 27-29 recent infections or hospitalizations.  Received first round of COVID-19 vaccine.

## 2021-03-17 LAB — CANCER AG27-29 SERPL-ACNC: 8.4 U/ML — SIGNIFICANT CHANGE UP (ref 0–38.6)

## 2021-04-23 ENCOUNTER — APPOINTMENT (OUTPATIENT)
Dept: BREAST CENTER | Facility: CLINIC | Age: 56
End: 2021-04-23
Payer: COMMERCIAL

## 2021-04-23 NOTE — PHYSICAL EXAM
[Sclera nonicteric] : sclera nonicteric [EOMI] : extra ocular movement intact [Supple] : supple [No Supraclavicular Adenopathy] : no supraclavicular adenopathy [No Cervical Adenopathy] : no cervical adenopathy [No Thyromegaly] : no thyromegaly [Clear to Auscultation Bilat] : clear to auscultation bilaterally [Examined in the supine and seated position] : examined in the supine and seated position [No dominant masses] : no dominant masses in right breast  [No dominant masses] : no dominant masses left breast [No Axillary Lymphadenopathy] : no left axillary lymphadenopathy [de-identified] : Circular skin islands.  Medial and lateral scars. [de-identified] : Circular skin islands. Medial and lateral scars. Mild radiation change.\par  [de-identified] : Transverse scar.

## 2021-04-23 NOTE — HISTORY OF PRESENT ILLNESS
[FreeTextEntry1] : This is a 55 year old  female who has a history of right breast cancer at age 37 treated with mastectomy/saline implant  reconstruction (Dr. Townsend/Reilly) and 6 months of chemotherapy (Dr. White- one drug was "red").  The right nipple was reconstructed with a left nipple graft. \par \par She complained of left breast itching in the lower breast since October 2018.  She had a mammogram and ultrasound that were negative.  She changed gynecologists to Dr. Faust in June and still complained of itching, so he sent her for an MRI. The MRI showed a number of abnormalities and a biopsy of 2 adjacent areas confirmed cancer. She has always had small pimple like lesions of her left nipple. She had a left breast saline implant that had been ruptured for at least 2 years.\par \par She underwent a left mastectomy with sentinel node biopsy and removal of both implants with bilateral DMITRI flap reconstructions on 9/12/2019 (Dr. Rodriguez).  She had two areas of cancer up to 2.5 cm.  One sentinel node was positive on final pathology.  ER 90% ND 70% Her 2 neg, Oncotype score was 24 (19%).\par \par She was scheduled for a left axillary dissection with LYMPHA procedure.  She developed a tender cord in her right forearm and had an extensive superficial phlebitis of her basilic vein.  She was started on Xarelto and the surgery was canceled.\par \par She received chemotherapy with TCx6 followed by radiation therapy.  She is on anastrazole.\par \par She still has stiffness and pain in the outer left reconstruction going to her back. (A bone scan in June was negative)\par \par She is scheduled for the next phase of her reconstruction in November.\par \par \par

## 2021-05-26 ENCOUNTER — RX RENEWAL (OUTPATIENT)
Age: 56
End: 2021-05-26

## 2021-06-18 ENCOUNTER — APPOINTMENT (OUTPATIENT)
Dept: BREAST CENTER | Facility: CLINIC | Age: 56
End: 2021-06-18
Payer: COMMERCIAL

## 2021-06-18 VITALS
HEIGHT: 69 IN | HEART RATE: 86 BPM | DIASTOLIC BLOOD PRESSURE: 69 MMHG | SYSTOLIC BLOOD PRESSURE: 117 MMHG | BODY MASS INDEX: 29.62 KG/M2 | WEIGHT: 200 LBS

## 2021-06-18 PROCEDURE — 99213 OFFICE O/P EST LOW 20 MIN: CPT

## 2021-06-18 PROCEDURE — 99072 ADDL SUPL MATRL&STAF TM PHE: CPT

## 2021-06-18 NOTE — HISTORY OF PRESENT ILLNESS
[FreeTextEntry1] : This is a 55 year old  female who has a history of right breast cancer at age 37 treated with mastectomy/saline implant  reconstruction (Dr. Townsend/Reilly) and 6 months of chemotherapy (Dr. White- one drug was "red").  The right nipple was reconstructed with a left nipple graft. \par \par She complained of left breast itching in the lower breast since October 2018.  She had a mammogram and ultrasound that were negative.  She changed gynecologists to Dr. Faust in June and still complained of itching, so he sent her for an MRI. The MRI showed a number of abnormalities and a biopsy of 2 adjacent areas confirmed cancer. She has always had small pimple like lesions of her left nipple. She had a left breast saline implant that had been ruptured for at least 2 years.\par \par She underwent a left mastectomy with sentinel node biopsy and removal of both implants with bilateral DMITRI flap reconstructions on 9/12/2019 (Dr. Rodriguez).  She had two areas of cancer up to 2.5 cm.  One sentinel node was positive on final pathology.  ER 90% KS 70% Her 2 neg, Oncotype score was 24 (19%).\par \par She was scheduled for a left axillary dissection with LYMPHA procedure.  She developed a tender cord in her right forearm and had an extensive superficial phlebitis of her basilic vein.  She was started on Xarelto and the surgery was canceled.\par \par She received chemotherapy with TCx6 followed by radiation therapy.  She is on anastrazole.\par \par She had the first revision of the reconstruction in November and will be having one more with removal of her port.\par \par She still has stiffness and pain in the outer left reconstruction going to her back. (A bone scan in June was negative).  She has episodes of sharp pain/spasms.  She finds much relief doing Pilates twice a week.\par \par \par \par \par

## 2021-06-18 NOTE — PHYSICAL EXAM
[EOMI] : extra ocular movement intact [Sclera nonicteric] : sclera nonicteric [Supple] : supple [No Supraclavicular Adenopathy] : no supraclavicular adenopathy [No Cervical Adenopathy] : no cervical adenopathy [No Thyromegaly] : no thyromegaly [Clear to Auscultation Bilat] : clear to auscultation bilaterally [Examined in the supine and seated position] : examined in the supine and seated position [No dominant masses] : no dominant masses in right breast  [No dominant masses] : no dominant masses left breast [No Axillary Lymphadenopathy] : no left axillary lymphadenopathy [de-identified] : [facility] upper right chest. [de-identified] : Circular skin islands.  Medial and lateral scars. [de-identified] : Circular skin islands. Medial and lateral scars. Mild radiation change.\par  [de-identified] : Transverse scar.

## 2021-07-07 ENCOUNTER — OUTPATIENT (OUTPATIENT)
Dept: OUTPATIENT SERVICES | Facility: HOSPITAL | Age: 56
LOS: 1 days | End: 2021-07-07
Payer: COMMERCIAL

## 2021-07-07 VITALS
WEIGHT: 203.93 LBS | HEART RATE: 90 BPM | TEMPERATURE: 98 F | SYSTOLIC BLOOD PRESSURE: 107 MMHG | HEIGHT: 69 IN | DIASTOLIC BLOOD PRESSURE: 72 MMHG | OXYGEN SATURATION: 97 % | RESPIRATION RATE: 18 BRPM

## 2021-07-07 DIAGNOSIS — Z90.13 ACQUIRED ABSENCE OF BILATERAL BREASTS AND NIPPLES: ICD-10-CM

## 2021-07-07 DIAGNOSIS — Z98.890 OTHER SPECIFIED POSTPROCEDURAL STATES: Chronic | ICD-10-CM

## 2021-07-07 DIAGNOSIS — Z90.11 ACQUIRED ABSENCE OF RIGHT BREAST AND NIPPLE: Chronic | ICD-10-CM

## 2021-07-07 DIAGNOSIS — Z01.818 ENCOUNTER FOR OTHER PREPROCEDURAL EXAMINATION: ICD-10-CM

## 2021-07-07 DIAGNOSIS — Z45.2 ENCOUNTER FOR ADJUSTMENT AND MANAGEMENT OF VASCULAR ACCESS DEVICE: Chronic | ICD-10-CM

## 2021-07-07 DIAGNOSIS — C50.919 MALIGNANT NEOPLASM OF UNSPECIFIED SITE OF UNSPECIFIED FEMALE BREAST: ICD-10-CM

## 2021-07-07 DIAGNOSIS — Z85.3 PERSONAL HISTORY OF MALIGNANT NEOPLASM OF BREAST: ICD-10-CM

## 2021-07-07 DIAGNOSIS — Z98.82 BREAST IMPLANT STATUS: Chronic | ICD-10-CM

## 2021-07-07 LAB
ANION GAP SERPL CALC-SCNC: 14 MMOL/L — SIGNIFICANT CHANGE UP (ref 5–17)
BUN SERPL-MCNC: 13 MG/DL — SIGNIFICANT CHANGE UP (ref 7–23)
CALCIUM SERPL-MCNC: 9.4 MG/DL — SIGNIFICANT CHANGE UP (ref 8.4–10.5)
CHLORIDE SERPL-SCNC: 100 MMOL/L — SIGNIFICANT CHANGE UP (ref 96–108)
CO2 SERPL-SCNC: 24 MMOL/L — SIGNIFICANT CHANGE UP (ref 22–31)
CREAT SERPL-MCNC: 0.82 MG/DL — SIGNIFICANT CHANGE UP (ref 0.5–1.3)
GLUCOSE SERPL-MCNC: 86 MG/DL — SIGNIFICANT CHANGE UP (ref 70–99)
HCT VFR BLD CALC: 40.9 % — SIGNIFICANT CHANGE UP (ref 34.5–45)
HGB BLD-MCNC: 13 G/DL — SIGNIFICANT CHANGE UP (ref 11.5–15.5)
MCHC RBC-ENTMCNC: 28.8 PG — SIGNIFICANT CHANGE UP (ref 27–34)
MCHC RBC-ENTMCNC: 31.8 GM/DL — LOW (ref 32–36)
MCV RBC AUTO: 90.5 FL — SIGNIFICANT CHANGE UP (ref 80–100)
NRBC # BLD: 0 /100 WBCS — SIGNIFICANT CHANGE UP (ref 0–0)
PLATELET # BLD AUTO: 387 K/UL — SIGNIFICANT CHANGE UP (ref 150–400)
POTASSIUM SERPL-MCNC: 4 MMOL/L — SIGNIFICANT CHANGE UP (ref 3.5–5.3)
POTASSIUM SERPL-SCNC: 4 MMOL/L — SIGNIFICANT CHANGE UP (ref 3.5–5.3)
RBC # BLD: 4.52 M/UL — SIGNIFICANT CHANGE UP (ref 3.8–5.2)
RBC # FLD: 13.7 % — SIGNIFICANT CHANGE UP (ref 10.3–14.5)
SODIUM SERPL-SCNC: 138 MMOL/L — SIGNIFICANT CHANGE UP (ref 135–145)
WBC # BLD: 7.21 K/UL — SIGNIFICANT CHANGE UP (ref 3.8–10.5)
WBC # FLD AUTO: 7.21 K/UL — SIGNIFICANT CHANGE UP (ref 3.8–10.5)

## 2021-07-07 PROCEDURE — 80048 BASIC METABOLIC PNL TOTAL CA: CPT

## 2021-07-07 PROCEDURE — G0463: CPT

## 2021-07-07 PROCEDURE — 85027 COMPLETE CBC AUTOMATED: CPT

## 2021-07-07 RX ORDER — LIDOCAINE HCL 20 MG/ML
0.2 VIAL (ML) INJECTION ONCE
Refills: 0 | Status: DISCONTINUED | OUTPATIENT
Start: 2021-07-21 | End: 2021-08-04

## 2021-07-07 RX ORDER — RIVAROXABAN 15 MG-20MG
1 KIT ORAL
Qty: 0 | Refills: 0 | DISCHARGE

## 2021-07-07 RX ORDER — ONDANSETRON 8 MG/1
1 TABLET, FILM COATED ORAL
Qty: 0 | Refills: 0 | DISCHARGE

## 2021-07-07 RX ORDER — ALPRAZOLAM 0.25 MG
1 TABLET ORAL
Qty: 0 | Refills: 0 | DISCHARGE

## 2021-07-07 RX ORDER — DEXAMETHASONE 0.5 MG/5ML
2 ELIXIR ORAL
Qty: 0 | Refills: 0 | DISCHARGE

## 2021-07-07 RX ORDER — SODIUM CHLORIDE 9 MG/ML
3 INJECTION INTRAMUSCULAR; INTRAVENOUS; SUBCUTANEOUS EVERY 8 HOURS
Refills: 0 | Status: DISCONTINUED | OUTPATIENT
Start: 2021-07-21 | End: 2021-08-04

## 2021-07-07 NOTE — H&P PST ADULT - NSICDXPROBLEM_GEN_ALL_CORE_FT
PROBLEM DIAGNOSES  Problem: Primary breast malignancy  Assessment and Plan: Scheduled flro bilateral revision reconstruction, bilateral abdominal/hip scar revision, removal of port on 7/21/21 with Dr. Rodriguez  Preop instructions and chlorohexidine soap given  Labs CBC BMP performed in PST  Covid vaccination on chart

## 2021-07-07 NOTE — H&P PST ADULT - HISTORY OF PRESENT ILLNESS
Th federico a 55 year old female with past medical history of Right breast cancer     Completed Chemo and radiation      **Covid vaccination on chart  Denies any history of Covid. Denies any recent illness, sick contact, or travelling  This is a 55 year old female with past medical history of Right breast cancer s/p right total mastectomy (2003), left breast s/p mastectomy with bilateral DMITRI flap reconstruction (2019) Completed Chemo and radiation 2020'. Follows up breast surgeon every 6 months. Presenting to PST for scheduled bilateral revision reconstruction,  bilateral abdominal/ hip scar revision, removal of port on 7/21/21 with Dr. Rodriguez     **Covid vaccination on chart  Denies any history of Covid. Denies any recent illness, sick contact, or travelling

## 2021-07-07 NOTE — H&P PST ADULT - NSICDXPASTMEDICALHX_GEN_ALL_CORE_FT
PAST MEDICAL HISTORY:  Breast CA     Endometriosis     Ovarian cyst     Phlebitis RUE superficial 10/2019     PAST MEDICAL HISTORY:  Breast CA     Endometriosis     Ovarian cyst     Phlebitis RUE superficial 10/2019, treated with Xarelto

## 2021-07-07 NOTE — H&P PST ADULT - NSICDXPASTSURGICALHX_GEN_ALL_CORE_FT
PAST SURGICAL HISTORY:  H/O removal of cyst ovarian cyst    H/O right breast implant s/p mastectomy    H/O total mastectomy of right breast 2003    S/P breast biopsy, left Mastectomy/DMITRI     PAST SURGICAL HISTORY:  Encounter for insertion of venous access port     H/O removal of cyst ovarian cyst    H/O right breast implant s/p mastectomy    H/O total mastectomy of right breast 2003    History of endometrial ablation     S/P breast biopsy, left Mastectomy/DMITRI

## 2021-07-07 NOTE — H&P PST ADULT - ACTIVITY
Pt perfroms pilates 2x weekly, denies any cardiac distress symptoms during exertion Pt performs piliates 2x weekly, denies any cardiac distress symptoms during exertion

## 2021-07-07 NOTE — H&P PST ADULT - ATTENDING COMMENTS
Patient for next stage of her breast reconstruction including revision of donor site and port removal. Plan and risks reviewed. Markings made. Consent signed.

## 2021-07-14 PROBLEM — I80.9 PHLEBITIS AND THROMBOPHLEBITIS OF UNSPECIFIED SITE: Chronic | Status: ACTIVE | Noted: 2019-10-25

## 2021-07-20 ENCOUNTER — TRANSCRIPTION ENCOUNTER (OUTPATIENT)
Age: 56
End: 2021-07-20

## 2021-07-21 ENCOUNTER — RESULT REVIEW (OUTPATIENT)
Age: 56
End: 2021-07-21

## 2021-07-21 ENCOUNTER — OUTPATIENT (OUTPATIENT)
Dept: OUTPATIENT SERVICES | Facility: HOSPITAL | Age: 56
LOS: 1 days | End: 2021-07-21
Payer: COMMERCIAL

## 2021-07-21 VITALS
RESPIRATION RATE: 16 BRPM | DIASTOLIC BLOOD PRESSURE: 65 MMHG | OXYGEN SATURATION: 98 % | HEART RATE: 74 BPM | WEIGHT: 203.93 LBS | TEMPERATURE: 98 F | HEIGHT: 69 IN | SYSTOLIC BLOOD PRESSURE: 109 MMHG

## 2021-07-21 VITALS
DIASTOLIC BLOOD PRESSURE: 66 MMHG | HEART RATE: 80 BPM | RESPIRATION RATE: 18 BRPM | TEMPERATURE: 97 F | OXYGEN SATURATION: 98 % | SYSTOLIC BLOOD PRESSURE: 112 MMHG

## 2021-07-21 DIAGNOSIS — Z98.890 OTHER SPECIFIED POSTPROCEDURAL STATES: Chronic | ICD-10-CM

## 2021-07-21 DIAGNOSIS — Z98.82 BREAST IMPLANT STATUS: Chronic | ICD-10-CM

## 2021-07-21 DIAGNOSIS — Z01.818 ENCOUNTER FOR OTHER PREPROCEDURAL EXAMINATION: ICD-10-CM

## 2021-07-21 DIAGNOSIS — Z90.13 ACQUIRED ABSENCE OF BILATERAL BREASTS AND NIPPLES: ICD-10-CM

## 2021-07-21 DIAGNOSIS — Z45.2 ENCOUNTER FOR ADJUSTMENT AND MANAGEMENT OF VASCULAR ACCESS DEVICE: Chronic | ICD-10-CM

## 2021-07-21 DIAGNOSIS — Z90.11 ACQUIRED ABSENCE OF RIGHT BREAST AND NIPPLE: Chronic | ICD-10-CM

## 2021-07-21 DIAGNOSIS — Z85.3 PERSONAL HISTORY OF MALIGNANT NEOPLASM OF BREAST: ICD-10-CM

## 2021-07-21 PROCEDURE — 88305 TISSUE EXAM BY PATHOLOGIST: CPT

## 2021-07-21 PROCEDURE — 19380 REVJ RECONSTRUCTED BREAST: CPT | Mod: 50

## 2021-07-21 PROCEDURE — 88302 TISSUE EXAM BY PATHOLOGIST: CPT

## 2021-07-21 PROCEDURE — 88304 TISSUE EXAM BY PATHOLOGIST: CPT | Mod: 26

## 2021-07-21 PROCEDURE — 88304 TISSUE EXAM BY PATHOLOGIST: CPT

## 2021-07-21 PROCEDURE — 88305 TISSUE EXAM BY PATHOLOGIST: CPT | Mod: 26

## 2021-07-21 PROCEDURE — 14301 TIS TRNFR ANY 30.1-60 SQ CM: CPT

## 2021-07-21 PROCEDURE — 13101 CMPLX RPR TRUNK 2.6-7.5 CM: CPT | Mod: XS

## 2021-07-21 PROCEDURE — 88302 TISSUE EXAM BY PATHOLOGIST: CPT | Mod: 26

## 2021-07-21 RX ORDER — CEFAZOLIN SODIUM 1 G
2000 VIAL (EA) INJECTION ONCE
Refills: 0 | Status: COMPLETED | OUTPATIENT
Start: 2021-07-21 | End: 2021-07-21

## 2021-07-21 RX ORDER — HYDROMORPHONE HYDROCHLORIDE 2 MG/ML
0.5 INJECTION INTRAMUSCULAR; INTRAVENOUS; SUBCUTANEOUS
Refills: 0 | Status: DISCONTINUED | OUTPATIENT
Start: 2021-07-21 | End: 2021-07-21

## 2021-07-21 RX ORDER — PANTOPRAZOLE SODIUM 20 MG/1
1 TABLET, DELAYED RELEASE ORAL
Qty: 0 | Refills: 0 | DISCHARGE

## 2021-07-21 RX ORDER — APREPITANT 80 MG/1
40 CAPSULE ORAL ONCE
Refills: 0 | Status: COMPLETED | OUTPATIENT
Start: 2021-07-21 | End: 2021-07-21

## 2021-07-21 RX ORDER — SODIUM CHLORIDE 9 MG/ML
1000 INJECTION, SOLUTION INTRAVENOUS
Refills: 0 | Status: DISCONTINUED | OUTPATIENT
Start: 2021-07-21 | End: 2021-08-04

## 2021-07-21 RX ORDER — SCOPALAMINE 1 MG/3D
1 PATCH, EXTENDED RELEASE TRANSDERMAL
Qty: 0 | Refills: 0 | DISCHARGE

## 2021-07-21 RX ORDER — ANASTROZOLE 1 MG/1
1 TABLET ORAL
Qty: 0 | Refills: 0 | DISCHARGE

## 2021-07-21 RX ORDER — ONDANSETRON 8 MG/1
4 TABLET, FILM COATED ORAL ONCE
Refills: 0 | Status: COMPLETED | OUTPATIENT
Start: 2021-07-21 | End: 2021-07-21

## 2021-07-21 RX ORDER — CHLORHEXIDINE GLUCONATE 213 G/1000ML
1 SOLUTION TOPICAL ONCE
Refills: 0 | Status: COMPLETED | OUTPATIENT
Start: 2021-07-21 | End: 2021-07-21

## 2021-07-21 RX ADMIN — CHLORHEXIDINE GLUCONATE 1 APPLICATION(S): 213 SOLUTION TOPICAL at 07:19

## 2021-07-21 RX ADMIN — APREPITANT 40 MILLIGRAM(S): 80 CAPSULE ORAL at 07:20

## 2021-07-21 RX ADMIN — ONDANSETRON 4 MILLIGRAM(S): 8 TABLET, FILM COATED ORAL at 11:25

## 2021-07-21 NOTE — ASU PATIENT PROFILE, ADULT - PSH
Encounter for insertion of venous access port    H/O removal of cyst  ovarian cyst  H/O right breast implant  s/p mastectomy  H/O total mastectomy of right breast  2003  History of endometrial ablation    S/P breast biopsy, left  Mastectomy/DMITRI

## 2021-07-21 NOTE — ASU DISCHARGE PLAN (ADULT/PEDIATRIC) - CARE PROVIDER_API CALL
Rober Rodriguez)  Plastic Surgery  [address], [address]  [place] [address]  Phone: [phone]  Fax: [phone]  Follow Up Time:

## 2021-07-21 NOTE — BRIEF OPERATIVE NOTE - NSICDXBRIEFPROCEDURE_GEN_ALL_CORE_FT
PROCEDURES:  Revision, breast mound 21-Jul-2021 11:11:00  Mike Bryson  Removal, venous port 21-Jul-2021 11:11:58  Mike Bryson  Biopsy of mass of abdominal wall 21-Jul-2021 11:12:21 excisional biopsy Mike Bryson

## 2021-07-21 NOTE — ASU PATIENT PROFILE, ADULT - TEACHING/LEARNING LEARNING PREFERENCES
verbal instruction/written material Xenograft Text: The defect edges were debeveled with a #15 scalpel blade.  Given the location of the defect, shape of the defect and the proximity to free margins a xenograft was deemed most appropriate.  The graft was then trimmed to fit the size of the defect.  The graft was then placed in the primary defect and oriented appropriately.

## 2021-07-21 NOTE — ASU PATIENT PROFILE, ADULT - PMH
Breast CA    Endometriosis    Ovarian cyst    Phlebitis  RUE superficial 10/2019, treated with Xarelto

## 2021-07-21 NOTE — BRIEF OPERATIVE NOTE - OPERATION/FINDINGS
revision of bilateral reconstructed breasts, reconstruction of Left IMF fold, left abdominal scar revision, excisional biopsy of left abdominal wall subcutaneous mass, removal of right mediport.

## 2021-07-21 NOTE — ASU DISCHARGE PLAN (ADULT/PEDIATRIC) - ASU DC SPECIAL INSTRUCTIONSFT
NOTIFY YOUR SURGEON IF YOU HAVE: any bleeding that does not stop, any pus draining from your wound(s), any fever (over 100.4 F) persistent nausea/vomiting, or if your pain is not controlled on your discharge pain medications, unable to urinate.    ACTIVITY: Please refrain from increased physical activity for a period of 2 weeks. Please do not lift anything heavier than a gallon of milk or about 5 pounds. Upon follow up you will be given further instructions on how much physical activity you may do.     DRESSING: please leave all dressings on, in place, and dry until follow up.      Please take 1000mg Tylenol every 6 hours as needed. Please do not exceed 4000mg Tylenol in any 24 hour period. Please take 400mg ibuprofen every 6 hours as needed for pain not controlled by Tylenol.     Please follow up with Dr. Rodriguez's PA on Wednesday 7/28/2021.

## 2021-07-21 NOTE — ASU DISCHARGE PLAN (ADULT/PEDIATRIC) - PROCEDURE
bilateral revision of reconstructed breasts, abdominal scar revision left, right mediport removal, left abdominal cyst removal.

## 2021-08-02 LAB — SURGICAL PATHOLOGY STUDY: SIGNIFICANT CHANGE UP

## 2021-08-31 ENCOUNTER — OUTPATIENT (OUTPATIENT)
Dept: OUTPATIENT SERVICES | Facility: HOSPITAL | Age: 56
LOS: 1 days | Discharge: HOME | End: 2021-08-31

## 2021-08-31 DIAGNOSIS — Z98.890 OTHER SPECIFIED POSTPROCEDURAL STATES: Chronic | ICD-10-CM

## 2021-08-31 DIAGNOSIS — Z17.0 ESTROGEN RECEPTOR POSITIVE STATUS [ER+]: ICD-10-CM

## 2021-08-31 DIAGNOSIS — Z90.11 ACQUIRED ABSENCE OF RIGHT BREAST AND NIPPLE: Chronic | ICD-10-CM

## 2021-08-31 DIAGNOSIS — Z98.82 BREAST IMPLANT STATUS: Chronic | ICD-10-CM

## 2021-08-31 DIAGNOSIS — C50.512 MALIGNANT NEOPLASM OF LOWER-OUTER QUADRANT OF LEFT FEMALE BREAST: ICD-10-CM

## 2021-08-31 DIAGNOSIS — C50.912 MALIGNANT NEOPLASM OF UNSPECIFIED SITE OF LEFT FEMALE BREAST: ICD-10-CM

## 2021-08-31 DIAGNOSIS — Z45.2 ENCOUNTER FOR ADJUSTMENT AND MANAGEMENT OF VASCULAR ACCESS DEVICE: Chronic | ICD-10-CM

## 2021-09-02 ENCOUNTER — APPOINTMENT (OUTPATIENT)
Age: 56
End: 2021-09-02

## 2021-11-03 ENCOUNTER — OUTPATIENT (OUTPATIENT)
Dept: OUTPATIENT SERVICES | Facility: HOSPITAL | Age: 56
LOS: 1 days | Discharge: HOME | End: 2021-11-03

## 2021-11-03 DIAGNOSIS — Z90.11 ACQUIRED ABSENCE OF RIGHT BREAST AND NIPPLE: Chronic | ICD-10-CM

## 2021-11-03 DIAGNOSIS — Z98.890 OTHER SPECIFIED POSTPROCEDURAL STATES: Chronic | ICD-10-CM

## 2021-11-03 DIAGNOSIS — C50.512 MALIGNANT NEOPLASM OF LOWER-OUTER QUADRANT OF LEFT FEMALE BREAST: ICD-10-CM

## 2021-11-03 DIAGNOSIS — Z45.2 ENCOUNTER FOR ADJUSTMENT AND MANAGEMENT OF VASCULAR ACCESS DEVICE: Chronic | ICD-10-CM

## 2021-11-03 DIAGNOSIS — Z98.82 BREAST IMPLANT STATUS: Chronic | ICD-10-CM

## 2021-11-05 ENCOUNTER — APPOINTMENT (OUTPATIENT)
Age: 56
End: 2021-11-05
Payer: COMMERCIAL

## 2021-11-05 ENCOUNTER — RESULT REVIEW (OUTPATIENT)
Age: 56
End: 2021-11-05

## 2021-11-05 VITALS
RESPIRATION RATE: 16 BRPM | SYSTOLIC BLOOD PRESSURE: 126 MMHG | HEART RATE: 88 BPM | DIASTOLIC BLOOD PRESSURE: 80 MMHG | TEMPERATURE: 97 F | OXYGEN SATURATION: 94 % | BODY MASS INDEX: 30.46 KG/M2 | WEIGHT: 206.23 LBS

## 2021-11-05 LAB
BASOPHILS # BLD AUTO: 0.12 K/UL — SIGNIFICANT CHANGE UP (ref 0–0.2)
BASOPHILS NFR BLD AUTO: 1.4 % — SIGNIFICANT CHANGE UP (ref 0–2)
EOSINOPHIL # BLD AUTO: 0.17 K/UL — SIGNIFICANT CHANGE UP (ref 0–0.5)
EOSINOPHIL NFR BLD AUTO: 1.9 % — SIGNIFICANT CHANGE UP (ref 0–6)
HCT VFR BLD CALC: 40.8 % — SIGNIFICANT CHANGE UP (ref 34.5–45)
HGB BLD-MCNC: 13.2 G/DL — SIGNIFICANT CHANGE UP (ref 11.5–15.5)
IMM GRANULOCYTES NFR BLD AUTO: 0.6 % — SIGNIFICANT CHANGE UP (ref 0–1.5)
LYMPHOCYTES # BLD AUTO: 2.5 K/UL — SIGNIFICANT CHANGE UP (ref 1–3.3)
LYMPHOCYTES # BLD AUTO: 28.3 % — SIGNIFICANT CHANGE UP (ref 13–44)
MCHC RBC-ENTMCNC: 29.1 PG — SIGNIFICANT CHANGE UP (ref 27–34)
MCHC RBC-ENTMCNC: 32.4 G/DL — SIGNIFICANT CHANGE UP (ref 32–36)
MCV RBC AUTO: 90.1 FL — SIGNIFICANT CHANGE UP (ref 80–100)
MONOCYTES # BLD AUTO: 0.6 K/UL — SIGNIFICANT CHANGE UP (ref 0–0.9)
MONOCYTES NFR BLD AUTO: 6.8 % — SIGNIFICANT CHANGE UP (ref 2–14)
NEUTROPHILS # BLD AUTO: 5.38 K/UL — SIGNIFICANT CHANGE UP (ref 1.8–7.4)
NEUTROPHILS NFR BLD AUTO: 61 % — SIGNIFICANT CHANGE UP (ref 43–77)
NRBC # BLD: 0 /100 WBCS — SIGNIFICANT CHANGE UP (ref 0–0)
PLATELET # BLD AUTO: 357 K/UL — SIGNIFICANT CHANGE UP (ref 150–400)
RBC # BLD: 4.53 M/UL — SIGNIFICANT CHANGE UP (ref 3.8–5.2)
RBC # FLD: 13.7 % — SIGNIFICANT CHANGE UP (ref 10.3–14.5)
WBC # BLD: 8.82 K/UL — SIGNIFICANT CHANGE UP (ref 3.8–10.5)
WBC # FLD AUTO: 8.82 K/UL — SIGNIFICANT CHANGE UP (ref 3.8–10.5)

## 2021-11-05 PROCEDURE — 99214 OFFICE O/P EST MOD 30 MIN: CPT

## 2021-11-06 LAB
25(OH)D3 SERPL-MCNC: 30.2 NG/ML
ALBUMIN SERPL ELPH-MCNC: 4.4 G/DL
ALP BLD-CCNC: 70 U/L
ALT SERPL-CCNC: 14 U/L
ANION GAP SERPL CALC-SCNC: 17 MMOL/L
AST SERPL-CCNC: 11 U/L
BILIRUB SERPL-MCNC: 0.2 MG/DL
BUN SERPL-MCNC: 14 MG/DL
CALCIUM SERPL-MCNC: 9.6 MG/DL
CHLORIDE SERPL-SCNC: 103 MMOL/L
CO2 SERPL-SCNC: 21 MMOL/L
CREAT SERPL-MCNC: 0.87 MG/DL
ESTRADIOL SERPL-MCNC: <5 PG/ML
FSH SERPL-MCNC: 92.4 IU/L
GLUCOSE SERPL-MCNC: 92 MG/DL
LH SERPL-ACNC: 37.4 IU/L
POTASSIUM SERPL-SCNC: 4.8 MMOL/L
PROT SERPL-MCNC: 7.2 G/DL
SODIUM SERPL-SCNC: 141 MMOL/L

## 2021-11-07 NOTE — PHYSICAL EXAM
[Fully active, able to carry on all pre-disease performance without restriction] : Status 0 - Fully active, able to carry on all pre-disease performance without restriction [Normal] : normal appearance, no rash, nodules, vesicles, ulcers, erythema [de-identified] : bilateral mastectomy ; bilateral DMITRI reconstruction, scars well healed.No nipple reconstruction; left breast slightly larger than the right [de-identified] : transverse abdominal incision healed; right sided wound opening with minimal serosanguineous secretions; left sided drain catheter in place, draining small amount of serosanguineous fluid

## 2021-11-07 NOTE — HISTORY OF PRESENT ILLNESS
[Disease: _____________________] : Disease: [unfilled] [T: ___] : T[unfilled] [N: ___] : N[unfilled] [AJCC Stage: ____] : AJCC Stage: [unfilled] [de-identified] : 56 F with history right breast cancer at age 37, s/p right mastectomy with saline implant reconstruction, and nipple reconstruction with left nipple graft, s/p adjuvant chemotherapy x 6 months (Dr. Rene White), referred for medical oncologic consultation after diagnosis of left breast carcinoma in September 2019. \par \par CASE SYNOPSIS: \par 5/29/03- segmental resection right breast for moderately differentiated infiltrating ductal carcinoma; margin involved by infiltrating tumor.\par \par 7/10/03- right total mastectomy with saline implant reconstruction( Jak Townsend/ Benny Grover). Received adjuvant systemic chemotherapy ( records to be obtained), under the care of Dr. Don White.\par \par 10/2018- reports lower left breast itching.\par \par 10/31/18- mammogram/ breast US: no suspicious lesions, no evidence of malignancy, intramammary lymph node 2:00 left breast.\par \par 6/24/19 MRI breast – right implant intact; left breast 5:00 N8, 2 adjacent masses 1, and 1.5 cm; abnormal clumped enhancement extending from masses medially, multiple additional masses throughout left breast: 6:00 N4 8 mm, 3:00 N6 1 cm, 2:00 N6 7 mm, 12:00 N6 9 mm, 10:00 N4 8 mm. Multiple small axillary lymph nodes.\par \par 6/25/19- DEXA scan: low bone mass based on left femoral neck T score (-1.2 SD from the mean).\par \par 7/9/19: left breast US and US-guided core biopsy; left axilla with sonographically normal lymph node. \par Left 4-5:00 N6 9 x 7 x 10 mm and irregular inferior mass 18 x 9 x 14 mm. Pathology: \par Left 4-5:00 superior- infiltrating ductal carcinoma, SBR 6/9, ER90%, AL 90%, Her 2 negative. \par Left 4-5:00 inferior - infiltrating ductal carcinoma, SBR 6/9, ER90%, AL 70%, Her 2 negative.\par \par 9/12/19: left mastectomy with sentinel node biopsy and removal of both implants with bilateral DMITRI flap reconstruction. \par Pathology: left breast with 2 foci of invasive ductal carcinoma 2.5 cm and 1.2 cm , SBR 6/9, with foci of DCIS in UOQ, + LVI and PNI, margins negative, 1 / 2 SLN positive ( 8 mm ; no extracapsular extension), 2 more negative non- sentinel LN,  right and left internal mammary nodes negative.(pT2, pN1, Mx = stage IIB)\par \par 10/1/19- develops venous thrombosis in the basilic vein, superficial, extended for at least 10 cm.  LYMPHA procedure postponed. Started on anticoagulation.\par \par 10/2/19: PET/CT consistent with FDG avidity associated with the reconstructed bilateral breast in the anterior abdominal wall; no FDG avid local regional or distant metastatic disease.\par \par 10/17/19 – chemo- education session.\par \par 11/5/19- 2/18/20- completes 6 cycles Docetaxel/ Cyclophosphamide.\par \par 4/6- 5/8/20-  XRT left chest wall.\par \par 5/8/20- starts anastrozole.\par \par 7/10/2020: Bone scan–no radionuclide evidence of osseous metastases. \par \par 7/22/2020: CTA–negative for pulmonary embolism; left upper lobe peripheral groundglass opacity, possibly infectious or inflammatory.\par \par 7/21/21- right and left breast revision reconstruction, reconstruction of right inframammary fold with external Ru skin advancement flap; removal of right chest Mediport, excision of the left lower abdominal wall donor site subcutaneous mass with wound closure and revision of left hip donor site scar deformity.  Pathology consistent with fat necrosis and unremarkable skin ( Dr. Rodriguez).\par \par  [de-identified] : infiltrating ductal carcinoma [de-identified] : ER 90%, VA 90%, Her 2 negative [FreeTextEntry1] : docetaxel/ cyclophosphamide\par Anastrozole–started 5/8/2020 [de-identified] : Returning for biannual medical oncology evaluation.  On 7/21/2021 Dr. Zelaya he performed a right and left breast revision reconstruction, reconstruction of right inframammary fold with external Ru skin advancement flap; removal of right chest Mediport, excision of the left lower abdominal wall donor site subcutaneous mass with wound closure and revision of left hip donor site scar deformity.  Pathology consistent with fat necrosis and unremarkable skin.  Patient has recovered without complications.  Continues to work, complains of being intermittently fatigued, but reports that neuropathy previously induced by chemotherapy has resolved.  Denies new constitutional symptoms and her physical exam is otherwise unchanged.  Medication list reviewed; patient compliant with AI (anastrozole) since May 2020.\par \par \par

## 2021-11-07 NOTE — REVIEW OF SYSTEMS
[Joint Pain] : joint pain [Negative] : Neurological [Skin Wound] : no skin wound [Skin Rash] : no skin rash [FreeTextEntry9] : mild arthritic pain hand joints

## 2021-11-07 NOTE — ASSESSMENT
[FreeTextEntry1] : Will RTC in 6 months. Ms. RODRIGUEZ  was advised to contact the office should she have any additional questions or concerns in the interim.\par \par

## 2021-11-08 LAB — CANCER AG27-29 SERPL-ACNC: 10.1 U/ML

## 2022-01-24 ENCOUNTER — RX RENEWAL (OUTPATIENT)
Age: 57
End: 2022-01-24

## 2022-02-16 ENCOUNTER — OUTPATIENT (OUTPATIENT)
Dept: OUTPATIENT SERVICES | Facility: HOSPITAL | Age: 57
LOS: 1 days | End: 2022-02-16
Payer: COMMERCIAL

## 2022-02-16 ENCOUNTER — APPOINTMENT (OUTPATIENT)
Dept: CT IMAGING | Facility: CLINIC | Age: 57
End: 2022-02-16
Payer: COMMERCIAL

## 2022-02-16 DIAGNOSIS — Z45.2 ENCOUNTER FOR ADJUSTMENT AND MANAGEMENT OF VASCULAR ACCESS DEVICE: Chronic | ICD-10-CM

## 2022-02-16 DIAGNOSIS — G44.229 CHRONIC TENSION-TYPE HEADACHE, NOT INTRACTABLE: ICD-10-CM

## 2022-02-16 DIAGNOSIS — Z98.82 BREAST IMPLANT STATUS: Chronic | ICD-10-CM

## 2022-02-16 DIAGNOSIS — Z90.11 ACQUIRED ABSENCE OF RIGHT BREAST AND NIPPLE: Chronic | ICD-10-CM

## 2022-02-16 DIAGNOSIS — Z98.890 OTHER SPECIFIED POSTPROCEDURAL STATES: Chronic | ICD-10-CM

## 2022-02-16 PROCEDURE — 70450 CT HEAD/BRAIN W/O DYE: CPT | Mod: 26

## 2022-02-16 PROCEDURE — 70450 CT HEAD/BRAIN W/O DYE: CPT

## 2022-03-23 ENCOUNTER — APPOINTMENT (OUTPATIENT)
Dept: BREAST CENTER | Facility: CLINIC | Age: 57
End: 2022-03-23

## 2022-03-25 NOTE — PHYSICAL EXAM
[EOMI] : extra ocular movement intact [Sclera nonicteric] : sclera nonicteric [Supple] : supple [No Supraclavicular Adenopathy] : no supraclavicular adenopathy [No Cervical Adenopathy] : no cervical adenopathy [No Thyromegaly] : no thyromegaly [Clear to Auscultation Bilat] : clear to auscultation bilaterally [Examined in the supine and seated position] : examined in the supine and seated position [No dominant masses] : no dominant masses in right breast  [No dominant masses] : no dominant masses left breast [No Axillary Lymphadenopathy] : no left axillary lymphadenopathy [de-identified] : [facility] upper right chest. [de-identified] : Circular skin islands.  Medial and lateral scars. [de-identified] : Circular skin islands. Medial and lateral scars. Mild radiation change.\par  [de-identified] : Transverse scar.

## 2022-03-25 NOTE — HISTORY OF PRESENT ILLNESS
[FreeTextEntry1] : This is a 56 year old  female who has a history of right breast cancer at age 37 treated with mastectomy/saline implant  reconstruction (Dr. Townsend/Reilly) and 6 months of chemotherapy (Dr. White- one drug was "red").  The right nipple was reconstructed with a left nipple graft. \par \par She complained of left breast itching in the lower breast since October 2018.  She had a mammogram and ultrasound that were negative.  She changed gynecologists to Dr. Faust in June and still complained of itching, so he sent her for an MRI. The MRI showed a number of abnormalities and a biopsy of 2 adjacent areas confirmed cancer. She has always had small pimple like lesions of her left nipple. She had a left breast saline implant that had been ruptured for at least 2 years.\par \par She underwent a left mastectomy with sentinel node biopsy and removal of both implants with bilateral DMITRI flap reconstructions on 9/12/2019 (Dr. Rodriguez).  She had two areas of cancer up to 2.5 cm.  One sentinel node was positive on final pathology.  ER 90% [place] 70% Her 2 neg, Oncotype score was 24 (19%).\par \par She was scheduled for a left axillary dissection with LYMPHA procedure.  She developed a tender cord in her right forearm and had an extensive superficial phlebitis of her basilic vein.  She was started on Xarelto and the surgery was canceled.\par \par She received chemotherapy with TCx6 followed by radiation therapy.  She is on anastrazole.\par \par She had the first revision of the reconstruction in November and will be having one more with removal of her port.\par \par She still has stiffness and pain in the outer left reconstruction going to her back. (A bone scan in June was negative).  She has episodes of sharp pain/spasms.  She finds much relief doing Pilates twice a week.\par \par \par \par \par

## 2022-03-30 ENCOUNTER — APPOINTMENT (OUTPATIENT)
Dept: OBGYN | Facility: CLINIC | Age: 57
End: 2022-03-30
Payer: COMMERCIAL

## 2022-03-30 VITALS
SYSTOLIC BLOOD PRESSURE: 124 MMHG | DIASTOLIC BLOOD PRESSURE: 60 MMHG | WEIGHT: 208 LBS | BODY MASS INDEX: 30.81 KG/M2 | HEIGHT: 69 IN

## 2022-03-30 DIAGNOSIS — Z85.3 PERSONAL HISTORY OF MALIGNANT NEOPLASM OF BREAST: ICD-10-CM

## 2022-03-30 DIAGNOSIS — Z01.419 ENCOUNTER FOR GYNECOLOGICAL EXAMINATION (GENERAL) (ROUTINE) W/OUT ABNORMAL FINDINGS: ICD-10-CM

## 2022-03-30 DIAGNOSIS — N95.2 POSTMENOPAUSAL ATROPHIC VAGINITIS: ICD-10-CM

## 2022-03-30 PROCEDURE — 99396 PREV VISIT EST AGE 40-64: CPT

## 2022-03-30 PROCEDURE — 82270 OCCULT BLOOD FECES: CPT

## 2022-03-30 NOTE — HISTORY OF PRESENT ILLNESS
[FreeTextEntry1] : Patient is a 56-year-old female who presents for a routine annual gynecologic examination.  Patient with a history of breast cancer and status post bilateral mastectomy and reconstruction.  Patient has no complaints.  Patient's last mammogram was July 2019 last bone density was June 2019.

## 2022-03-30 NOTE — PHYSICAL EXAM
[Appropriately responsive] : appropriately responsive [Alert] : alert [No Acute Distress] : no acute distress [No Lymphadenopathy] : no lymphadenopathy [Regular Rate Rhythm] : regular rate rhythm [No Murmurs] : no murmurs [Clear to Auscultation B/L] : clear to auscultation bilaterally [Soft] : soft [Non-tender] : non-tender [Non-distended] : non-distended [No HSM] : No HSM [No Lesions] : no lesions [No Mass] : no mass [Oriented x3] : oriented x3 [FreeTextEntry6] : Bilateral breast reconstruction status post mastectomy, reconstruction is soft nontender without any masses no skin changes, axilla negative there is no lymphadenopathy [Examination Of The Breasts] : a normal appearance [No Masses] : no breast masses were palpable [Vulvar Atrophy] : vulvar atrophy [Labia Majora] : normal [Labia Minora] : normal [Atrophy] : atrophy [Normal] : normal [Uterine Adnexae] : normal [No Tenderness] : no tenderness [Nl Sphincter Tone] : normal sphincter tone [FreeTextEntry5] : Pap done [FreeTextEntry9] : Hemoccult negative

## 2022-04-01 LAB — HPV HIGH+LOW RISK DNA PNL CVX: NOT DETECTED

## 2022-04-11 NOTE — H&P PST ADULT - LYMPHATICS COMMENTS
No cervical/supraclavicular lymphadenopathy palpated on exam. clear to auscultation bilaterally/no rales/no rhonchi/no wheezes

## 2022-05-02 ENCOUNTER — OUTPATIENT (OUTPATIENT)
Dept: OUTPATIENT SERVICES | Facility: HOSPITAL | Age: 57
LOS: 1 days | Discharge: HOME | End: 2022-05-02

## 2022-05-02 DIAGNOSIS — Z98.890 OTHER SPECIFIED POSTPROCEDURAL STATES: Chronic | ICD-10-CM

## 2022-05-02 DIAGNOSIS — C50.512 MALIGNANT NEOPLASM OF LOWER-OUTER QUADRANT OF LEFT FEMALE BREAST: ICD-10-CM

## 2022-05-02 DIAGNOSIS — Z45.2 ENCOUNTER FOR ADJUSTMENT AND MANAGEMENT OF VASCULAR ACCESS DEVICE: Chronic | ICD-10-CM

## 2022-05-02 DIAGNOSIS — Z90.11 ACQUIRED ABSENCE OF RIGHT BREAST AND NIPPLE: Chronic | ICD-10-CM

## 2022-05-02 DIAGNOSIS — Z98.82 BREAST IMPLANT STATUS: Chronic | ICD-10-CM

## 2022-05-06 ENCOUNTER — APPOINTMENT (OUTPATIENT)
Age: 57
End: 2022-05-06
Payer: COMMERCIAL

## 2022-05-25 ENCOUNTER — APPOINTMENT (OUTPATIENT)
Dept: GASTROENTEROLOGY | Facility: CLINIC | Age: 57
End: 2022-05-25
Payer: COMMERCIAL

## 2022-05-25 DIAGNOSIS — Z12.11 ENCOUNTER FOR SCREENING FOR MALIGNANT NEOPLASM OF COLON: ICD-10-CM

## 2022-05-25 PROCEDURE — 99203 OFFICE O/P NEW LOW 30 MIN: CPT

## 2022-05-25 NOTE — HISTORY OF PRESENT ILLNESS
[de-identified] : Ms. NUHA RODRIGUEZ is a 56 year old female presents for screening colonoscopy. Patient has no complaints of bowel issues, bleeding, abdominal pain, family history of colon cancer. Patient has a history of breast cancer. Last colonoscopy was over five years ago. Patient does note frequent heartburn, but no dysphagia. Patient does note globus sensation at night.

## 2022-05-25 NOTE — ASSESSMENT
[FreeTextEntry1] : 55 yo female screening colonoscopy with history of GERD symptoms. Will arrange colonoscopy and upper endoscopy.

## 2022-06-02 ENCOUNTER — OUTPATIENT (OUTPATIENT)
Dept: OUTPATIENT SERVICES | Facility: HOSPITAL | Age: 57
LOS: 1 days | Discharge: HOME | End: 2022-06-02

## 2022-06-02 DIAGNOSIS — Z98.890 OTHER SPECIFIED POSTPROCEDURAL STATES: Chronic | ICD-10-CM

## 2022-06-02 DIAGNOSIS — Z90.11 ACQUIRED ABSENCE OF RIGHT BREAST AND NIPPLE: Chronic | ICD-10-CM

## 2022-06-02 DIAGNOSIS — C50.512 MALIGNANT NEOPLASM OF LOWER-OUTER QUADRANT OF LEFT FEMALE BREAST: ICD-10-CM

## 2022-06-02 DIAGNOSIS — Z98.82 BREAST IMPLANT STATUS: Chronic | ICD-10-CM

## 2022-06-02 DIAGNOSIS — Z45.2 ENCOUNTER FOR ADJUSTMENT AND MANAGEMENT OF VASCULAR ACCESS DEVICE: Chronic | ICD-10-CM

## 2022-06-09 ENCOUNTER — RESULT REVIEW (OUTPATIENT)
Age: 57
End: 2022-06-09

## 2022-06-09 ENCOUNTER — APPOINTMENT (OUTPATIENT)
Age: 57
End: 2022-06-09
Payer: COMMERCIAL

## 2022-06-09 DIAGNOSIS — M85.80 OTHER SPECIFIED DISORDERS OF BONE DENSITY AND STRUCTURE, UNSPECIFIED SITE: ICD-10-CM

## 2022-06-09 LAB
25(OH)D3 SERPL-MCNC: 29.8 NG/ML
ALBUMIN SERPL ELPH-MCNC: 4.5 G/DL
ALP BLD-CCNC: 66 U/L
ALT SERPL-CCNC: 16 U/L
ANION GAP SERPL CALC-SCNC: 13 MMOL/L
AST SERPL-CCNC: 14 U/L
BASOPHILS # BLD AUTO: 0.11 K/UL — SIGNIFICANT CHANGE UP (ref 0–0.2)
BASOPHILS NFR BLD AUTO: 1.4 % — SIGNIFICANT CHANGE UP (ref 0–2)
BILIRUB SERPL-MCNC: 0.3 MG/DL
BUN SERPL-MCNC: 16 MG/DL
CALCIUM SERPL-MCNC: 9.9 MG/DL
CHLORIDE SERPL-SCNC: 104 MMOL/L
CO2 SERPL-SCNC: 26 MMOL/L
CREAT SERPL-MCNC: 0.9 MG/DL
EGFR: 75 ML/MIN/1.73M2
EOSINOPHIL # BLD AUTO: 0.18 K/UL — SIGNIFICANT CHANGE UP (ref 0–0.5)
EOSINOPHIL NFR BLD AUTO: 2.2 % — SIGNIFICANT CHANGE UP (ref 0–6)
GLUCOSE SERPL-MCNC: 94 MG/DL
HCT VFR BLD CALC: 40.2 % — SIGNIFICANT CHANGE UP (ref 34.5–45)
HGB BLD-MCNC: 13 G/DL — SIGNIFICANT CHANGE UP (ref 11.5–15.5)
IMM GRANULOCYTES NFR BLD AUTO: 0.4 % — SIGNIFICANT CHANGE UP (ref 0–1.5)
LYMPHOCYTES # BLD AUTO: 2.23 K/UL — SIGNIFICANT CHANGE UP (ref 1–3.3)
LYMPHOCYTES # BLD AUTO: 27.4 % — SIGNIFICANT CHANGE UP (ref 13–44)
MCHC RBC-ENTMCNC: 29 PG — SIGNIFICANT CHANGE UP (ref 27–34)
MCHC RBC-ENTMCNC: 32.3 G/DL — SIGNIFICANT CHANGE UP (ref 32–36)
MCV RBC AUTO: 89.7 FL — SIGNIFICANT CHANGE UP (ref 80–100)
MONOCYTES # BLD AUTO: 0.64 K/UL — SIGNIFICANT CHANGE UP (ref 0–0.9)
MONOCYTES NFR BLD AUTO: 7.9 % — SIGNIFICANT CHANGE UP (ref 2–14)
NEUTROPHILS # BLD AUTO: 4.95 K/UL — SIGNIFICANT CHANGE UP (ref 1.8–7.4)
NEUTROPHILS NFR BLD AUTO: 60.7 % — SIGNIFICANT CHANGE UP (ref 43–77)
NRBC # BLD: 0 /100 WBCS — SIGNIFICANT CHANGE UP (ref 0–0)
PLATELET # BLD AUTO: 328 K/UL — SIGNIFICANT CHANGE UP (ref 150–400)
POTASSIUM SERPL-SCNC: 4.4 MMOL/L
PROT SERPL-MCNC: 7.2 G/DL
RBC # BLD: 4.48 M/UL — SIGNIFICANT CHANGE UP (ref 3.8–5.2)
RBC # FLD: 13.5 % — SIGNIFICANT CHANGE UP (ref 10.3–14.5)
SODIUM SERPL-SCNC: 142 MMOL/L
WBC # BLD: 8.14 K/UL — SIGNIFICANT CHANGE UP (ref 3.8–10.5)
WBC # FLD AUTO: 8.14 K/UL — SIGNIFICANT CHANGE UP (ref 3.8–10.5)

## 2022-06-09 PROCEDURE — 99214 OFFICE O/P EST MOD 30 MIN: CPT

## 2022-06-09 NOTE — ASSESSMENT
[FreeTextEntry1] : Will RTC in 6 months. Ms. RODRIGUEZ  was advised to contact the office should she have any additional questions or concerns in the interim.\par \par Stage IIb (T2, N1) left breast infiltrating ductal carcinoma, ER 90%, FL 90%, HER-2 negative diagnosed June 2019.  Patient has been on adjuvant hormonal therapy with anastrozole since May 2020, and seems to tolerate treatment overall with no significant musculoskeletal side effects.  Reviewed with patient results of pathology from recent plastic surgery revision consistent with fat necrosis of abdominal wall, and no remarkable pathology on both reconstructed breast.  She will continue to be monitored biannually with physical exam and blood work.  RTC in 6-month.

## 2022-06-09 NOTE — PHYSICAL EXAM
[Fully active, able to carry on all pre-disease performance without restriction] : Status 0 - Fully active, able to carry on all pre-disease performance without restriction [Normal] : normal appearance, no rash, nodules, vesicles, ulcers, erythema [de-identified] : bilateral mastectomy ; bilateral DMITRI reconstruction, scars well healed.No nipple reconstruction; left breast slightly larger than the right [de-identified] : transverse abdominal incision healed; right sided wound opening with minimal serosanguineous secretions; left sided drain catheter in place, draining small amount of serosanguineous fluid

## 2022-06-09 NOTE — RESULTS/DATA
[FreeTextEntry1] : I reviewed today's and recent blood work results with patient.\par \par 5/28/2020:\par WBC 6, hemoglobin 12.5 g/dL, hematocrit 40.1%, platelet 333,000 \par \par 2/18/20:\par WBC 13.54 K, hemoglobin 11 g/ dL, hematocrit  34.3 %, platelet  444,000\par \par 1/7/20:\par WBC 10.56 K, hemoglobin 11.5 g/ dL, hematocrit 35.6 %, platelet  504,000\par \par 11/5/19:\par WBC 14.62, hemoglobin 12.7 g/dL, hematocrit 39.1%, platelets 481 pounds\par \par 9/13/19: \par WBC 16.23, hemoglobin 10.7 g/dL, hematocrit 33%, platelet 343,000 7

## 2022-06-09 NOTE — HISTORY OF PRESENT ILLNESS
[Disease: _____________________] : Disease: [unfilled] [T: ___] : T[unfilled] [N: ___] : N[unfilled] [AJCC Stage: ____] : AJCC Stage: [unfilled] [de-identified] : 56 F with history right breast cancer at age 37, s/p right mastectomy with saline implant reconstruction, and nipple reconstruction with left nipple graft, s/p adjuvant chemotherapy x 6 months (Dr. Rene White), referred for medical oncologic consultation after diagnosis of left breast carcinoma in September 2019. \par \par CASE SYNOPSIS: \par 5/29/03- segmental resection right breast for moderately differentiated infiltrating ductal carcinoma; margin involved by infiltrating tumor.\par \par 7/10/03- right total mastectomy with saline implant reconstruction( Jak Townsend/ Benny Grover). Received adjuvant systemic chemotherapy ( records to be obtained), under the care of Dr. Don White.\par \par 10/2018- reports lower left breast itching.\par \par 10/31/18- mammogram/ breast US: no suspicious lesions, no evidence of malignancy, intramammary lymph node 2:00 left breast.\par \par 6/24/19 MRI breast – right implant intact; left breast 5:00 N8, 2 adjacent masses 1, and 1.5 cm; abnormal clumped enhancement extending from masses medially, multiple additional masses throughout left breast: 6:00 N4 8 mm, 3:00 N6 1 cm, 2:00 N6 7 mm, 12:00 N6 9 mm, 10:00 N4 8 mm. Multiple small axillary lymph nodes.\par \par 6/25/19- DEXA scan: low bone mass based on left femoral neck T score (-1.2 SD from the mean).\par \par 7/9/19: left breast US and US-guided core biopsy; left axilla with sonographically normal lymph node. \par Left 4-5:00 N6 9 x 7 x 10 mm and irregular inferior mass 18 x 9 x 14 mm. Pathology: \par Left 4-5:00 superior- infiltrating ductal carcinoma, SBR 6/9, ER90%, DC 90%, Her 2 negative. \par Left 4-5:00 inferior - infiltrating ductal carcinoma, SBR 6/9, ER90%, DC 70%, Her 2 negative.\par \par 9/12/19: left mastectomy with sentinel node biopsy and removal of both implants with bilateral DMITRI flap reconstruction. \par Pathology: left breast with 2 foci of invasive ductal carcinoma 2.5 cm and 1.2 cm , SBR 6/9, with foci of DCIS in UOQ, + LVI and PNI, margins negative, 1 / 2 SLN positive ( 8 mm ; no extracapsular extension), 2 more negative non- sentinel LN,  right and left internal mammary nodes negative.(pT2, pN1, Mx = stage IIB)\par \par 10/1/19- develops venous thrombosis in the basilic vein, superficial, extended for at least 10 cm.  LYMPHA procedure postponed. Started on anticoagulation.\par \par 10/2/19: PET/CT consistent with FDG avidity associated with the reconstructed bilateral breast in the anterior abdominal wall; no FDG avid local regional or distant metastatic disease.\par \par 10/17/19 – chemo- education session.\par \par 11/5/19- 2/18/20- completes 6 cycles Docetaxel/ Cyclophosphamide.\par \par 4/6- 5/8/20-  XRT left chest wall.\par \par 5/8/20- starts anastrozole.\par \par 7/10/2020: Bone scan–no radionuclide evidence of osseous metastases. \par \par 7/22/2020: CTA–negative for pulmonary embolism; left upper lobe peripheral groundglass opacity, possibly infectious or inflammatory.\par \par 7/21/21- right and left breast revision reconstruction, reconstruction of right inframammary fold with external Ru skin advancement flap; removal of right chest Mediport, excision of the left lower abdominal wall donor site subcutaneous mass with wound closure and revision of left hip donor site scar deformity.  Pathology consistent with fat necrosis and unremarkable skin ( Dr. Rodriguez).\par \par  [de-identified] : infiltrating ductal carcinoma [de-identified] : ER 90%, NV 90%, Her 2 negative [FreeTextEntry1] : docetaxel/ cyclophosphamide\par Anastrozole–started 5/8/2020 [de-identified] : Here for biannual medical oncology follow-up.  Since her last visit in November 2021 patient had follow-ups with GI screening EGD/colonoscopy in May 2022, and GYN (exam normal).  Feeling much better after removal of [id number] in November 2021.  Had no further plastic surgery and physical exam is unchanged from 6-month ago.  Noticed a small cyst between reconstructed breasts, and is occasionally experiencing left axillary shooting pain, short-lived.  She noticed lumpiness in the anterior side of the left breast, but has not followed up with Dr. Rodriguez yet.  She also has postponed visit with tattoo specialist for nipple reconstruction.\par Continues to work full-time; reports no fatigue.  Denies B symptoms, recent hospitalizations.  Did not have repeat DEXA scan yet.\par COVID vaccinated.\par \par \par

## 2022-06-10 LAB — CANCER AG27-29 SERPL-ACNC: 8.9 U/ML

## 2022-06-29 ENCOUNTER — APPOINTMENT (OUTPATIENT)
Dept: BREAST CENTER | Facility: CLINIC | Age: 57
End: 2022-06-29

## 2022-06-29 ENCOUNTER — LABORATORY RESULT (OUTPATIENT)
Age: 57
End: 2022-06-29

## 2022-06-29 VITALS
HEART RATE: 88 BPM | HEIGHT: 69 IN | BODY MASS INDEX: 31.25 KG/M2 | WEIGHT: 211 LBS | SYSTOLIC BLOOD PRESSURE: 124 MMHG | DIASTOLIC BLOOD PRESSURE: 77 MMHG

## 2022-06-29 DIAGNOSIS — N64.1 FAT NECROSIS OF BREAST: ICD-10-CM

## 2022-06-29 PROCEDURE — 76642 ULTRASOUND BREAST LIMITED: CPT

## 2022-06-29 PROCEDURE — 99213 OFFICE O/P EST LOW 20 MIN: CPT

## 2022-06-29 PROCEDURE — 10005 FNA BX W/US GDN 1ST LES: CPT

## 2022-06-29 NOTE — PHYSICAL EXAM
[EOMI] : extra ocular movement intact [Sclera nonicteric] : sclera nonicteric [Supple] : supple [No Supraclavicular Adenopathy] : no supraclavicular adenopathy [No Cervical Adenopathy] : no cervical adenopathy [No Thyromegaly] : no thyromegaly [Clear to Auscultation Bilat] : clear to auscultation bilaterally [Examined in the supine and seated position] : examined in the supine and seated position [No dominant masses] : no dominant masses in right breast  [No dominant masses] : no dominant masses left breast [No Axillary Lymphadenopathy] : no left axillary lymphadenopathy [de-identified] : [facility] upper right chest. [de-identified] : Circular skin islands.  Medial and lateral scars. [de-identified] : Circular skin islands. Medial and lateral scars. <1 cm nodule 9:00 medial breast. Tiny pimple like lesion with skin opening xiphoid area.\par  [de-identified] : Transverse scar.

## 2022-06-29 NOTE — HISTORY OF PRESENT ILLNESS
[FreeTextEntry1] : This is a 56 year old  female who has a history of right breast cancer at age 37 treated with mastectomy/saline implant  reconstruction (Dr. Townsend/Reilly) and 6 months of chemotherapy (Dr. White- one drug was "red").  The right nipple was reconstructed with a left nipple graft. \par \par She complained of left breast itching in the lower breast since October 2018.  She had a mammogram and ultrasound that were negative.  She changed gynecologists to Dr. Faust in June and still complained of itching, so he sent her for an MRI. The MRI showed a number of abnormalities and a biopsy of 2 adjacent areas confirmed cancer. She has always had small pimple like lesions of her left nipple. She had a left breast saline implant that had been ruptured for at least 2 years.\par \par She underwent a left mastectomy with sentinel node biopsy and removal of both implants with bilateral DMITRI flap reconstructions on 9/12/2019 (Dr. Rodriguez).  She had two areas of cancer up to 2.5 cm.  One sentinel node was positive on final pathology.  ER 90% CA 70% Her 2 neg, Oncotype score was 24 (19%).\par \par She was scheduled for a left axillary dissection with LYMPHA procedure.  She developed a tender cord in her right forearm and had an extensive superficial phlebitis of her basilic vein.  She was started on Xarelto and the surgery was canceled.\par \par She received chemotherapy with TCx6 followed by radiation therapy.  She is on anastrazole (Dr. Lindo).\par \par She had the first revision of the reconstruction in November 2020 and had one more with removal of her port..\par \par She feels a lump in the inner left breast and has a small pimple like lesion between her breasts that doesn't seem to heal.\par \par \par \par

## 2022-06-29 NOTE — PROCEDURE
[FreeTextEntry1] : Left breast ultrasound and ultrasound guided aspiration [FreeTextEntry2] : Assess palpable area [FreeTextEntry3] : The left 9:00 breast shows an oval hypoechoic lesion 7x5 mm with adjacent clip artifact associated with posterior shadowing.  An ultrasound guided aspiration was performed with resolution of the hypoechoic lesion.  Thick oily material was withdrawn and a cytologic smear was made.

## 2022-07-15 ENCOUNTER — LABORATORY RESULT (OUTPATIENT)
Age: 57
End: 2022-07-15

## 2022-07-15 RX ORDER — SODIUM PICOSULFATE, MAGNESIUM OXIDE, AND ANHYDROUS CITRIC ACID 10; 3.5; 12 MG/160ML; G/160ML; G/160ML
10-3.5-12 MG-GM LIQUID ORAL
Qty: 1 | Refills: 0 | Status: DISCONTINUED | COMMUNITY
Start: 2022-05-25 | End: 2022-07-14

## 2022-07-19 ENCOUNTER — APPOINTMENT (OUTPATIENT)
Dept: GASTROENTEROLOGY | Facility: AMBULATORY MEDICAL SERVICES | Age: 57
End: 2022-07-19

## 2022-07-19 PROCEDURE — 43239 EGD BIOPSY SINGLE/MULTIPLE: CPT | Mod: 59,GC

## 2022-07-19 PROCEDURE — 45378 DIAGNOSTIC COLONOSCOPY: CPT | Mod: GC

## 2022-08-26 NOTE — ED ADULT NURSE NOTE - CCCP TRG CHIEF CMPLNT
Physical Therapy Visit    Referred by: Carlyle Townsend MD; Medical Diagnosis (from order):    Diagnosis Information      Diagnosis    V43.61 (ICD-9-CM) - Z96.612 (ICD-10-CM) - Status post reverse total shoulder replacement, left              Visit: 9    Visit Type: Daily Treatment Note  Diagnosis Precautions: Pacemaker     Precautions for left shoulder-  -Forward elevation- 120 degrees  -External rotation- 40 degrees    SUBJECTIVE                                                                                                               Patient reports she saw Dr. Townsend and he gave her new instructions. She states her left shoulder is feeling pretty good. Patient reports her pain is at a 0/10 currently.     OBJECTIVE                                                                                                                        TREATMENT                                                                                                                  Therapeutic Exercise:  Upper body ergometer in a forward direction- 10 minutes with discussion on current condition.   -5 minutes in retro direction  Bilateral shoulder flexion with table slides- 2 x 10  Left shoulder scaption with table slide- 2 x 10  Cross body adduction with gentle stretch for left shoulder- 3 x 20 seconds  Pulleys into flexion- 5 minutes to tolerance  Blue theraband- 3 x 10   -external rotation  -internal rotation    Skilled input: verbal instruction/cues    Writer verbally educated and received verbal consent for hand placement, positioning of patient, and techniques to be performed today from patient for therapist position for techniques as described above and how they are pertinent to the patient's plan of care.    Home Exercise Program/Education Materials: Patient was instructed on continued performance of current HEP     ASSESSMENT                                                                                                              Patient was recently consulted by physician and started with progressions for range of motion for left upper extremity this visit. Patient was also continued with progression for increasing left shoulder strength. She is to continue to be progressed as tolerated and according to new physician guidelines at next treatment session.       PLAN                                                                                                                           Suggestions for next session as indicated: Progress per plan of care         Therapy procedure time and total treatment time can be found documented on the Time Entry flowsheet   abdominal pain

## 2022-12-05 ENCOUNTER — OUTPATIENT (OUTPATIENT)
Dept: OUTPATIENT SERVICES | Facility: HOSPITAL | Age: 57
LOS: 1 days | Discharge: HOME | End: 2022-12-05

## 2022-12-05 DIAGNOSIS — Z98.890 OTHER SPECIFIED POSTPROCEDURAL STATES: Chronic | ICD-10-CM

## 2022-12-05 DIAGNOSIS — Z98.82 BREAST IMPLANT STATUS: Chronic | ICD-10-CM

## 2022-12-05 DIAGNOSIS — Z90.11 ACQUIRED ABSENCE OF RIGHT BREAST AND NIPPLE: Chronic | ICD-10-CM

## 2022-12-05 DIAGNOSIS — Z45.2 ENCOUNTER FOR ADJUSTMENT AND MANAGEMENT OF VASCULAR ACCESS DEVICE: Chronic | ICD-10-CM

## 2022-12-05 DIAGNOSIS — C50.512 MALIGNANT NEOPLASM OF LOWER-OUTER QUADRANT OF LEFT FEMALE BREAST: ICD-10-CM

## 2022-12-28 ENCOUNTER — APPOINTMENT (OUTPATIENT)
Dept: BREAST CENTER | Facility: CLINIC | Age: 57
End: 2022-12-28

## 2022-12-28 VITALS
DIASTOLIC BLOOD PRESSURE: 77 MMHG | SYSTOLIC BLOOD PRESSURE: 133 MMHG | WEIGHT: 210 LBS | HEIGHT: 69 IN | BODY MASS INDEX: 31.1 KG/M2 | HEART RATE: 91 BPM

## 2022-12-28 DIAGNOSIS — Z85.3 PERSONAL HISTORY OF MALIGNANT NEOPLASM OF BREAST: ICD-10-CM

## 2022-12-28 DIAGNOSIS — Z90.13 ACQUIRED ABSENCE OF BILATERAL BREASTS AND NIPPLES: ICD-10-CM

## 2022-12-28 PROCEDURE — 99213 OFFICE O/P EST LOW 20 MIN: CPT

## 2022-12-28 NOTE — HISTORY OF PRESENT ILLNESS
[FreeTextEntry1] : This is a 57 year old  female who has a history of right breast cancer at age 37 treated with mastectomy/saline implant  reconstruction (Dr. Townsend/Reilly) and 6 months of chemotherapy (Dr. White- one drug was "red").  The right nipple was reconstructed with a left nipple graft. \par \par She complained of left breast itching in the lower breast since October 2018.  She had a mammogram and ultrasound that were negative.  She changed gynecologists to Dr. Faust in June and still complained of itching, so he sent her for an MRI. The MRI showed a number of abnormalities and a biopsy of 2 adjacent areas confirmed cancer. She has always had small pimple like lesions of her left nipple. She had a left breast saline implant that had been ruptured for at least 2 years.\par \par She underwent a left mastectomy with sentinel node biopsy and removal of both implants with bilateral DMITRI flap reconstructions on 9/12/2019 (Dr. Rodriguez).  She had two areas of cancer up to 2.5 cm.  One sentinel node was positive on final pathology.  ER 90% SD 70% Her 2 neg, Oncotype score was 24 (19%).\par \par She was scheduled for a left axillary dissection with LYMPHA procedure.  She developed a tender cord in her right forearm and had an extensive superficial phlebitis of her basilic vein.  She was started on Xarelto and the surgery was canceled.\par \par She received chemotherapy with TCx6 followed by radiation therapy.  She is on anastrazole (Dr. Lindo).\par \par She had the first revision of the reconstruction in November 2020 and had one more with removal of her port.\par \par She an oil cyst was aspirated from her left breast on her last visit in June.  She also had a small pimple like lesion between her breasts that has since resolved.\par \par She complains of tightness in the left breast area with discomfort that feels like when milk lets down.  She has some stiffness that was better when she was doing Pilates, but she hasn't done that in a while.\par \par \par \par

## 2022-12-28 NOTE — PHYSICAL EXAM
[EOMI] : extra ocular movement intact [Sclera nonicteric] : sclera nonicteric [Supple] : supple [No Supraclavicular Adenopathy] : no supraclavicular adenopathy [No Cervical Adenopathy] : no cervical adenopathy [No Thyromegaly] : no thyromegaly [Clear to Auscultation Bilat] : clear to auscultation bilaterally [Examined in the supine and seated position] : examined in the supine and seated position [No dominant masses] : no dominant masses in right breast  [No dominant masses] : no dominant masses left breast [No Axillary Lymphadenopathy] : no left axillary lymphadenopathy [de-identified] : [facility] upper right chest. [de-identified] : Circular skin islands.  Medial and lateral scars. [de-identified] : Circular skin islands. Medial and lateral scars. Mild fibrosis. [de-identified] : Transverse scar.

## 2023-01-10 ENCOUNTER — RX RENEWAL (OUTPATIENT)
Age: 58
End: 2023-01-10

## 2023-01-12 ENCOUNTER — APPOINTMENT (OUTPATIENT)
Age: 58
End: 2023-01-12
Payer: COMMERCIAL

## 2023-01-12 ENCOUNTER — RESULT REVIEW (OUTPATIENT)
Age: 58
End: 2023-01-12

## 2023-01-12 LAB
BASOPHILS # BLD AUTO: 0.1 K/UL — SIGNIFICANT CHANGE UP (ref 0–0.2)
BASOPHILS NFR BLD AUTO: 1.2 % — SIGNIFICANT CHANGE UP (ref 0–2)
EOSINOPHIL # BLD AUTO: 0.12 K/UL — SIGNIFICANT CHANGE UP (ref 0–0.5)
EOSINOPHIL NFR BLD AUTO: 1.5 % — SIGNIFICANT CHANGE UP (ref 0–6)
HCT VFR BLD CALC: 39.8 % — SIGNIFICANT CHANGE UP (ref 34.5–45)
HGB BLD-MCNC: 13.1 G/DL — SIGNIFICANT CHANGE UP (ref 11.5–15.5)
IMM GRANULOCYTES NFR BLD AUTO: 0.4 % — SIGNIFICANT CHANGE UP (ref 0–0.9)
LYMPHOCYTES # BLD AUTO: 2 K/UL — SIGNIFICANT CHANGE UP (ref 1–3.3)
LYMPHOCYTES # BLD AUTO: 24.7 % — SIGNIFICANT CHANGE UP (ref 13–44)
MCHC RBC-ENTMCNC: 29 PG — SIGNIFICANT CHANGE UP (ref 27–34)
MCHC RBC-ENTMCNC: 32.9 G/DL — SIGNIFICANT CHANGE UP (ref 32–36)
MCV RBC AUTO: 88.2 FL — SIGNIFICANT CHANGE UP (ref 80–100)
MONOCYTES # BLD AUTO: 0.72 K/UL — SIGNIFICANT CHANGE UP (ref 0–0.9)
MONOCYTES NFR BLD AUTO: 8.9 % — SIGNIFICANT CHANGE UP (ref 2–14)
NEUTROPHILS # BLD AUTO: 5.13 K/UL — SIGNIFICANT CHANGE UP (ref 1.8–7.4)
NEUTROPHILS NFR BLD AUTO: 63.3 % — SIGNIFICANT CHANGE UP (ref 43–77)
NRBC # BLD: 0 /100 WBCS — SIGNIFICANT CHANGE UP (ref 0–0)
PLATELET # BLD AUTO: 358 K/UL — SIGNIFICANT CHANGE UP (ref 150–400)
RBC # BLD: 4.51 M/UL — SIGNIFICANT CHANGE UP (ref 3.8–5.2)
RBC # FLD: 13.5 % — SIGNIFICANT CHANGE UP (ref 10.3–14.5)
WBC # BLD: 8.1 K/UL — SIGNIFICANT CHANGE UP (ref 3.8–10.5)
WBC # FLD AUTO: 8.1 K/UL — SIGNIFICANT CHANGE UP (ref 3.8–10.5)

## 2023-01-12 PROCEDURE — 99214 OFFICE O/P EST MOD 30 MIN: CPT

## 2023-01-12 NOTE — HISTORY OF PRESENT ILLNESS
[Disease: _____________________] : Disease: [unfilled] [T: ___] : T[unfilled] [N: ___] : N[unfilled] [AJCC Stage: ____] : AJCC Stage: [unfilled] [de-identified] : 57 F with history right breast cancer at age 37, s/p right mastectomy with saline implant reconstruction, and nipple reconstruction with left nipple graft, s/p adjuvant chemotherapy x 6 months (Dr. Rene White), referred for medical oncologic consultation after diagnosis of left breast carcinoma in September 2019. \par \par CASE SYNOPSIS: \par 5/29/03- segmental resection right breast for moderately differentiated infiltrating ductal carcinoma; margin involved by infiltrating tumor.\par \par 7/10/03- right total mastectomy with saline implant reconstruction( Jak Townsend/ Benny Grover). Received adjuvant systemic chemotherapy ( records to be obtained), under the care of Dr. Don White.\par \par 10/2018- reports lower left breast itching.\par \par 10/31/18- mammogram/ breast US: no suspicious lesions, no evidence of malignancy, intramammary lymph node 2:00 left breast.\par \par 6/24/19 MRI breast – right implant intact; left breast 5:00 N8, 2 adjacent masses 1, and 1.5 cm; abnormal clumped enhancement extending from masses medially, multiple additional masses throughout left breast: 6:00 N4 8 mm, 3:00 N6 1 cm, 2:00 N6 7 mm, 12:00 N6 9 mm, 10:00 N4 8 mm. Multiple small axillary lymph nodes.\par \par 6/25/19- DEXA scan: low bone mass based on left femoral neck T score (-1.2 SD from the mean).\par \par 7/9/19: left breast US and US-guided core biopsy; left axilla with sonographically normal lymph node. \par Left 4-5:00 N6 9 x 7 x 10 mm and irregular inferior mass 18 x 9 x 14 mm. Pathology: \par Left 4-5:00 superior- infiltrating ductal carcinoma, SBR 6/9, ER90%, FL 90%, Her 2 negative. \par Left 4-5:00 inferior - infiltrating ductal carcinoma, SBR 6/9, ER90%, FL 70%, Her 2 negative.\par \par 9/12/19: left mastectomy with sentinel node biopsy and removal of both implants with bilateral DMITRI flap reconstruction. \par Pathology: left breast with 2 foci of invasive ductal carcinoma 2.5 cm and 1.2 cm , SBR 6/9, with foci of DCIS in UOQ, + LVI and PNI, margins negative, 1 / 2 SLN positive ( 8 mm ; no extracapsular extension), 2 more negative non- sentinel LN,  right and left internal mammary nodes negative.(pT2, pN1, Mx = stage IIB)\par \par 10/1/19- develops venous thrombosis in the basilic vein, superficial, extended for at least 10 cm.  LYMPHA procedure postponed. Started on anticoagulation.\par \par 10/2/19: PET/CT consistent with FDG avidity associated with the reconstructed bilateral breast in the anterior abdominal wall; no FDG avid local regional or distant metastatic disease.\par \par 10/17/19 – chemo- education session.\par \par 11/5/19- 2/18/20- completes 6 cycles Docetaxel/ Cyclophosphamide.\par \par 4/6- 5/8/20-  XRT left chest wall.\par \par 5/8/20- starts anastrozole.\par \par 7/10/2020: Bone scan–no radionuclide evidence of osseous metastases. \par \par 7/22/2020: CTA–negative for pulmonary embolism; left upper lobe peripheral groundglass opacity, possibly infectious or inflammatory.\par \par 7/21/21- right and left breast revision reconstruction, reconstruction of right inframammary fold with external Ru skin advancement flap; removal of right chest Mediport, excision of the left lower abdominal wall donor site subcutaneous mass with wound closure and revision of left hip donor site scar deformity.  Pathology consistent with fat necrosis and unremarkable skin ( Dr. Rodriguez).\par \par 6/29/22- FNA left breast 9:00: negative for malignancy [de-identified] : infiltrating ductal carcinoma [de-identified] : ER 90%, AK 90%, Her 2 negative [FreeTextEntry1] : docetaxel/ cyclophosphamide\par Anastrozole–started 5/8/2020 [de-identified] : Here for biannual follow up; last seen in June 2022.Ms. RODRIGUEZ is doing well, complies with adjuvant hormonal treatment ( Anastrozole) for the past 2.5 years.\par She had 2 surgical revisions postmastectomy, but still complains of tightness in the left breast area (chronic since her surgery), reportedly getting worse.\par She also is complaining of chronic cough, possible laryngeal reflux.\par On 7/19/2022 patient had colonoscopy and endoscopy which showed grade 1 esophagitis; taking PPI.\par Was evaluated by surgery in December 2022–no suspicious findings.\par Denies new hospitalizations, falls, fractures, B symptoms.  Continues to work full-time.\par .\par

## 2023-01-12 NOTE — ASSESSMENT
[FreeTextEntry1] : Will RTC in 6 months. Ms. RODRIGUEZ  was advised to contact the office should she have any additional questions or concerns in the interim.\par \par \par \par

## 2023-01-12 NOTE — PHYSICAL EXAM
[Fully active, able to carry on all pre-disease performance without restriction] : Status 0 - Fully active, able to carry on all pre-disease performance without restriction [Normal] : normal appearance, no rash, nodules, vesicles, ulcers, erythema [de-identified] : bilateral mastectomy ; bilateral DMITRI reconstruction, scars well healed.No nipple reconstruction; left breast slightly larger than the right [de-identified] : transverse abdominal incision healed; right sided wound opening with minimal serosanguineous secretions; left sided drain catheter in place, draining small amount of serosanguineous fluid

## 2023-01-26 ENCOUNTER — OUTPATIENT (OUTPATIENT)
Dept: OUTPATIENT SERVICES | Facility: HOSPITAL | Age: 58
LOS: 1 days | End: 2023-01-26
Payer: COMMERCIAL

## 2023-01-26 ENCOUNTER — APPOINTMENT (OUTPATIENT)
Dept: RADIOLOGY | Facility: CLINIC | Age: 58
End: 2023-01-26
Payer: COMMERCIAL

## 2023-01-26 DIAGNOSIS — Z98.82 BREAST IMPLANT STATUS: Chronic | ICD-10-CM

## 2023-01-26 DIAGNOSIS — Z45.2 ENCOUNTER FOR ADJUSTMENT AND MANAGEMENT OF VASCULAR ACCESS DEVICE: Chronic | ICD-10-CM

## 2023-01-26 DIAGNOSIS — Z98.890 OTHER SPECIFIED POSTPROCEDURAL STATES: Chronic | ICD-10-CM

## 2023-01-26 DIAGNOSIS — Z90.11 ACQUIRED ABSENCE OF RIGHT BREAST AND NIPPLE: Chronic | ICD-10-CM

## 2023-01-26 DIAGNOSIS — Z00.8 ENCOUNTER FOR OTHER GENERAL EXAMINATION: ICD-10-CM

## 2023-01-26 PROCEDURE — 71046 X-RAY EXAM CHEST 2 VIEWS: CPT | Mod: 26

## 2023-01-26 PROCEDURE — 71046 X-RAY EXAM CHEST 2 VIEWS: CPT

## 2023-01-28 LAB
25(OH)D3 SERPL-MCNC: 29.4 NG/ML
ALBUMIN SERPL ELPH-MCNC: 4.7 G/DL
ALP BLD-CCNC: 70 U/L
ALT SERPL-CCNC: 14 U/L
ANION GAP SERPL CALC-SCNC: 19 MMOL/L
AST SERPL-CCNC: 9 U/L
BILIRUB SERPL-MCNC: 0.3 MG/DL
BUN SERPL-MCNC: 16 MG/DL
CALCIUM SERPL-MCNC: 9.9 MG/DL
CANCER AG27-29 SERPL-ACNC: 7.3 U/ML
CHLORIDE SERPL-SCNC: 102 MMOL/L
CO2 SERPL-SCNC: 20 MMOL/L
CREAT SERPL-MCNC: 0.9 MG/DL
EGFR: 75 ML/MIN/1.73M2
GLUCOSE SERPL-MCNC: 94 MG/DL
POTASSIUM SERPL-SCNC: 5.2 MMOL/L
PROT SERPL-MCNC: 7.1 G/DL
SODIUM SERPL-SCNC: 141 MMOL/L

## 2023-02-20 ENCOUNTER — APPOINTMENT (OUTPATIENT)
Dept: RADIOLOGY | Facility: CLINIC | Age: 58
End: 2023-02-20

## 2023-03-31 NOTE — ED PROVIDER NOTE - PMH
You were seen today for some discoloration, tingling and pain in the distal aspect of your left index finger. You had basic lab work performed which was reassuring. Additionally you had a CT scan with contrast of the left upper extremity. This did not show any large occlusion of the artery or blood clot. However this was unable to fully evaluate the smaller vessels within the hand and finger. While here in the emergency department you did have some improvement in the discoloration of the finger. Ultimately at this time we do not know exactly what is causing this and you need further work-up. It is very important that you call your primary care physician tomorrow in order to arrange close follow-up. If anything changes, your pain worsens, discoloration returns or worsens, or any other worsening or concerning symptoms arise, please return to the emergency department immediately.
Breast CA    Endometriosis    Ovarian cyst    Phlebitis  RUE superficial 10/2019

## 2023-04-03 ENCOUNTER — APPOINTMENT (OUTPATIENT)
Dept: OBGYN | Facility: CLINIC | Age: 58
End: 2023-04-03
Payer: COMMERCIAL

## 2023-04-03 VITALS — HEIGHT: 69 IN | RESPIRATION RATE: 18 BRPM | DIASTOLIC BLOOD PRESSURE: 74 MMHG | SYSTOLIC BLOOD PRESSURE: 124 MMHG

## 2023-04-03 DIAGNOSIS — N95.2 POSTMENOPAUSAL ATROPHIC VAGINITIS: ICD-10-CM

## 2023-04-03 DIAGNOSIS — Z85.3 PERSONAL HISTORY OF MALIGNANT NEOPLASM OF BREAST: ICD-10-CM

## 2023-04-03 DIAGNOSIS — Z01.419 ENCOUNTER FOR GYNECOLOGICAL EXAMINATION (GENERAL) (ROUTINE) W/OUT ABNORMAL FINDINGS: ICD-10-CM

## 2023-04-03 PROCEDURE — 82270 OCCULT BLOOD FECES: CPT

## 2023-04-03 PROCEDURE — 99396 PREV VISIT EST AGE 40-64: CPT

## 2023-04-03 NOTE — PHYSICAL EXAM
[Appropriately responsive] : appropriately responsive [Alert] : alert [No Acute Distress] : no acute distress [No Lymphadenopathy] : no lymphadenopathy [Regular Rate Rhythm] : regular rate rhythm [No Murmurs] : no murmurs [Clear to Auscultation B/L] : clear to auscultation bilaterally [Soft] : soft [Non-tender] : non-tender [Non-distended] : non-distended [No HSM] : No HSM [No Lesions] : no lesions [No Mass] : no mass [Oriented x3] : oriented x3 [FreeTextEntry6] : Status post bilateral mastectomy and reconstruction [Examination Of The Breasts] : a normal appearance [No Masses] : no breast masses were palpable [Vulvar Atrophy] : vulvar atrophy [Labia Majora] : normal [Labia Minora] : normal [Atrophy] : atrophy [Normal] : normal [Uterine Adnexae] : normal [No Tenderness] : no tenderness [Nl Sphincter Tone] : normal sphincter tone [FreeTextEntry5] : Pap done [FreeTextEntry9] : Hemoccult negative

## 2023-04-03 NOTE — HISTORY OF PRESENT ILLNESS
[FreeTextEntry1] : Patient is a 57-year-old female presents for routine annual gynecologic examination.  Patient with a personal history of breast cancer status post bilateral mastectomy and is currently on Arimidex and being followed by breast surgery.  Patient has no complaints.  Patient's last bone density test was June 2019

## 2023-04-04 ENCOUNTER — APPOINTMENT (OUTPATIENT)
Dept: RADIOLOGY | Facility: CLINIC | Age: 58
End: 2023-04-04
Payer: COMMERCIAL

## 2023-04-04 ENCOUNTER — OUTPATIENT (OUTPATIENT)
Dept: OUTPATIENT SERVICES | Facility: HOSPITAL | Age: 58
LOS: 1 days | End: 2023-04-04
Payer: COMMERCIAL

## 2023-04-04 DIAGNOSIS — Z00.8 ENCOUNTER FOR OTHER GENERAL EXAMINATION: ICD-10-CM

## 2023-04-04 DIAGNOSIS — Z98.890 OTHER SPECIFIED POSTPROCEDURAL STATES: Chronic | ICD-10-CM

## 2023-04-04 DIAGNOSIS — Z90.11 ACQUIRED ABSENCE OF RIGHT BREAST AND NIPPLE: Chronic | ICD-10-CM

## 2023-04-04 DIAGNOSIS — Z45.2 ENCOUNTER FOR ADJUSTMENT AND MANAGEMENT OF VASCULAR ACCESS DEVICE: Chronic | ICD-10-CM

## 2023-04-04 DIAGNOSIS — Z98.82 BREAST IMPLANT STATUS: Chronic | ICD-10-CM

## 2023-04-04 DIAGNOSIS — M85.80 OTHER SPECIFIED DISORDERS OF BONE DENSITY AND STRUCTURE, UNSPECIFIED SITE: ICD-10-CM

## 2023-04-04 LAB — HPV HIGH+LOW RISK DNA PNL CVX: NOT DETECTED

## 2023-04-04 PROCEDURE — 77085 DXA BONE DENSITY AXL VRT FX: CPT

## 2023-04-04 PROCEDURE — 77085 DXA BONE DENSITY AXL VRT FX: CPT | Mod: 26

## 2023-04-06 LAB — CYTOLOGY CVX/VAG DOC THIN PREP: ABNORMAL

## 2023-06-20 ENCOUNTER — APPOINTMENT (OUTPATIENT)
Dept: BREAST CENTER | Facility: CLINIC | Age: 58
End: 2023-06-20

## 2023-06-21 NOTE — HISTORY OF PRESENT ILLNESS
[FreeTextEntry1] : This is a 57 year old  female who has a history of right breast cancer at age 37 treated with mastectomy/saline implant  reconstruction (Dr. Townsend/Reilly) and 6 months of chemotherapy (Dr. White- one drug was "red").  The right nipple was reconstructed with a left nipple graft. \par \par She complained of left breast itching in the lower breast since October 2018.  She had a mammogram and ultrasound that were negative.  She changed gynecologists to Dr. Faust in June and still complained of itching, so he sent her for an MRI. The MRI showed a number of abnormalities and a biopsy of 2 adjacent areas confirmed cancer. She has always had small pimple like lesions of her left nipple. She had a left breast saline implant that had been ruptured for at least 2 years.\par \par She underwent a left mastectomy with sentinel node biopsy and removal of both implants with bilateral DMITRI flap reconstructions on 9/12/2019 (Dr. Rodriguez).  She had two areas of cancer up to 2.5 cm.  One sentinel node was positive on final pathology.  ER 90% [place] 70% Her 2 neg, Oncotype score was 24 (19%).\par \par She was scheduled for a left axillary dissection with LYMPHA procedure.  She developed a tender cord in her right forearm and had an extensive superficial phlebitis of her basilic vein.  She was started on Xarelto and the surgery was canceled.\par \par She received chemotherapy with TCx6 followed by radiation therapy.  She is on anastrazole (Dr. Lindo).\par \par She had the first revision of the reconstruction in November 2020 and had one more with removal of her port.\par \par She an oil cyst was aspirated from her left breast on her last visit in June.  She also had a small pimple like lesion between her breasts that has since resolved.\par \par She complains of tightness in the left breast area with discomfort that feels like when milk lets down.  She has some stiffness that was better when she was doing Pilates, but she hasn't done that in a while.\par \par \par \par

## 2023-06-21 NOTE — PHYSICAL EXAM
[EOMI] : extra ocular movement intact [Sclera nonicteric] : sclera nonicteric [Supple] : supple [No Supraclavicular Adenopathy] : no supraclavicular adenopathy [No Cervical Adenopathy] : no cervical adenopathy [No Thyromegaly] : no thyromegaly [Clear to Auscultation Bilat] : clear to auscultation bilaterally [Examined in the supine and seated position] : examined in the supine and seated position [No dominant masses] : no dominant masses in right breast  [No dominant masses] : no dominant masses left breast [No Axillary Lymphadenopathy] : no left axillary lymphadenopathy [de-identified] : [facility] upper right chest. [de-identified] : Circular skin islands.  Medial and lateral scars. [de-identified] : Circular skin islands. Medial and lateral scars. Mild fibrosis. [de-identified] : Transverse scar.

## 2023-06-25 ENCOUNTER — EMERGENCY (EMERGENCY)
Facility: HOSPITAL | Age: 58
LOS: 0 days | Discharge: HOME | End: 2023-06-25
Attending: STUDENT IN AN ORGANIZED HEALTH CARE EDUCATION/TRAINING PROGRAM
Payer: COMMERCIAL

## 2023-06-25 VITALS
OXYGEN SATURATION: 100 % | HEART RATE: 80 BPM | DIASTOLIC BLOOD PRESSURE: 70 MMHG | SYSTOLIC BLOOD PRESSURE: 115 MMHG | TEMPERATURE: 98 F | RESPIRATION RATE: 16 BRPM

## 2023-06-25 VITALS
RESPIRATION RATE: 17 BRPM | OXYGEN SATURATION: 100 % | TEMPERATURE: 98 F | HEART RATE: 98 BPM | SYSTOLIC BLOOD PRESSURE: 120 MMHG | DIASTOLIC BLOOD PRESSURE: 76 MMHG

## 2023-06-25 DIAGNOSIS — Z45.2 ENCOUNTER FOR ADJUSTMENT AND MANAGEMENT OF VASCULAR ACCESS DEVICE: Chronic | ICD-10-CM

## 2023-06-25 DIAGNOSIS — R10.2 PELVIC AND PERINEAL PAIN: ICD-10-CM

## 2023-06-25 DIAGNOSIS — Z90.11 ACQUIRED ABSENCE OF RIGHT BREAST AND NIPPLE: ICD-10-CM

## 2023-06-25 DIAGNOSIS — Z98.890 OTHER SPECIFIED POSTPROCEDURAL STATES: Chronic | ICD-10-CM

## 2023-06-25 DIAGNOSIS — M54.50 LOW BACK PAIN, UNSPECIFIED: ICD-10-CM

## 2023-06-25 DIAGNOSIS — K57.92 DIVERTICULITIS OF INTESTINE, PART UNSPECIFIED, WITHOUT PERFORATION OR ABSCESS WITHOUT BLEEDING: ICD-10-CM

## 2023-06-25 DIAGNOSIS — Z92.21 PERSONAL HISTORY OF ANTINEOPLASTIC CHEMOTHERAPY: ICD-10-CM

## 2023-06-25 DIAGNOSIS — Z90.11 ACQUIRED ABSENCE OF RIGHT BREAST AND NIPPLE: Chronic | ICD-10-CM

## 2023-06-25 DIAGNOSIS — R51.9 HEADACHE, UNSPECIFIED: ICD-10-CM

## 2023-06-25 DIAGNOSIS — Z98.82 BREAST IMPLANT STATUS: Chronic | ICD-10-CM

## 2023-06-25 DIAGNOSIS — M79.10 MYALGIA, UNSPECIFIED SITE: ICD-10-CM

## 2023-06-25 DIAGNOSIS — R35.0 FREQUENCY OF MICTURITION: ICD-10-CM

## 2023-06-25 DIAGNOSIS — Z85.3 PERSONAL HISTORY OF MALIGNANT NEOPLASM OF BREAST: ICD-10-CM

## 2023-06-25 LAB
ALBUMIN SERPL ELPH-MCNC: 3.7 G/DL — SIGNIFICANT CHANGE UP (ref 3.3–5)
ALP SERPL-CCNC: 66 U/L — SIGNIFICANT CHANGE UP (ref 40–120)
ALT FLD-CCNC: 17 U/L — SIGNIFICANT CHANGE UP (ref 12–78)
ANION GAP SERPL CALC-SCNC: 2 MMOL/L — LOW (ref 5–17)
APPEARANCE UR: CLEAR — SIGNIFICANT CHANGE UP
AST SERPL-CCNC: 7 U/L — LOW (ref 15–37)
BACTERIA # UR AUTO: ABNORMAL
BASOPHILS # BLD AUTO: 0.09 K/UL — SIGNIFICANT CHANGE UP (ref 0–0.2)
BASOPHILS NFR BLD AUTO: 0.6 % — SIGNIFICANT CHANGE UP (ref 0–2)
BILIRUB SERPL-MCNC: 0.8 MG/DL — SIGNIFICANT CHANGE UP (ref 0.2–1.2)
BILIRUB UR-MCNC: NEGATIVE — SIGNIFICANT CHANGE UP
BUN SERPL-MCNC: 11 MG/DL — SIGNIFICANT CHANGE UP (ref 7–23)
CALCIUM SERPL-MCNC: 9.8 MG/DL — SIGNIFICANT CHANGE UP (ref 8.5–10.1)
CHLORIDE SERPL-SCNC: 109 MMOL/L — HIGH (ref 96–108)
CO2 SERPL-SCNC: 30 MMOL/L — SIGNIFICANT CHANGE UP (ref 22–31)
COLOR SPEC: YELLOW — SIGNIFICANT CHANGE UP
COMMENT - URINE: SIGNIFICANT CHANGE UP
CREAT SERPL-MCNC: 0.92 MG/DL — SIGNIFICANT CHANGE UP (ref 0.5–1.3)
DIFF PNL FLD: ABNORMAL
EGFR: 73 ML/MIN/1.73M2 — SIGNIFICANT CHANGE UP
EOSINOPHIL # BLD AUTO: 0.12 K/UL — SIGNIFICANT CHANGE UP (ref 0–0.5)
EOSINOPHIL NFR BLD AUTO: 0.8 % — SIGNIFICANT CHANGE UP (ref 0–6)
EPI CELLS # UR: SIGNIFICANT CHANGE UP
GLUCOSE SERPL-MCNC: 107 MG/DL — HIGH (ref 70–99)
GLUCOSE UR QL: NEGATIVE — SIGNIFICANT CHANGE UP
HCT VFR BLD CALC: 41 % — SIGNIFICANT CHANGE UP (ref 34.5–45)
HGB BLD-MCNC: 13.6 G/DL — SIGNIFICANT CHANGE UP (ref 11.5–15.5)
IMM GRANULOCYTES NFR BLD AUTO: 0.4 % — SIGNIFICANT CHANGE UP (ref 0–0.9)
KETONES UR-MCNC: NEGATIVE — SIGNIFICANT CHANGE UP
LEUKOCYTE ESTERASE UR-ACNC: ABNORMAL
LIDOCAIN IGE QN: 86 U/L — SIGNIFICANT CHANGE UP (ref 73–393)
LYMPHOCYTES # BLD AUTO: 14.4 % — SIGNIFICANT CHANGE UP (ref 13–44)
LYMPHOCYTES # BLD AUTO: 2.1 K/UL — SIGNIFICANT CHANGE UP (ref 1–3.3)
MCHC RBC-ENTMCNC: 29.4 PG — SIGNIFICANT CHANGE UP (ref 27–34)
MCHC RBC-ENTMCNC: 33.2 GM/DL — SIGNIFICANT CHANGE UP (ref 32–36)
MCV RBC AUTO: 88.7 FL — SIGNIFICANT CHANGE UP (ref 80–100)
MONOCYTES # BLD AUTO: 1.28 K/UL — HIGH (ref 0–0.9)
MONOCYTES NFR BLD AUTO: 8.8 % — SIGNIFICANT CHANGE UP (ref 2–14)
NEUTROPHILS # BLD AUTO: 10.89 K/UL — HIGH (ref 1.8–7.4)
NEUTROPHILS NFR BLD AUTO: 75 % — SIGNIFICANT CHANGE UP (ref 43–77)
NITRITE UR-MCNC: NEGATIVE — SIGNIFICANT CHANGE UP
PH UR: 5 — SIGNIFICANT CHANGE UP (ref 5–8)
PLATELET # BLD AUTO: 371 K/UL — SIGNIFICANT CHANGE UP (ref 150–400)
POTASSIUM SERPL-MCNC: 4.1 MMOL/L — SIGNIFICANT CHANGE UP (ref 3.5–5.3)
POTASSIUM SERPL-SCNC: 4.1 MMOL/L — SIGNIFICANT CHANGE UP (ref 3.5–5.3)
PROT SERPL-MCNC: 7.8 GM/DL — SIGNIFICANT CHANGE UP (ref 6–8.3)
PROT UR-MCNC: NEGATIVE — SIGNIFICANT CHANGE UP
RBC # BLD: 4.62 M/UL — SIGNIFICANT CHANGE UP (ref 3.8–5.2)
RBC # FLD: 13.7 % — SIGNIFICANT CHANGE UP (ref 10.3–14.5)
RBC CASTS # UR COMP ASSIST: NEGATIVE /HPF — SIGNIFICANT CHANGE UP (ref 0–4)
SODIUM SERPL-SCNC: 141 MMOL/L — SIGNIFICANT CHANGE UP (ref 135–145)
SP GR SPEC: 1.01 — SIGNIFICANT CHANGE UP (ref 1.01–1.02)
UROBILINOGEN FLD QL: NEGATIVE — SIGNIFICANT CHANGE UP
WBC # BLD: 14.54 K/UL — HIGH (ref 3.8–10.5)
WBC # FLD AUTO: 14.54 K/UL — HIGH (ref 3.8–10.5)
WBC UR QL: SIGNIFICANT CHANGE UP /HPF (ref 0–5)

## 2023-06-25 PROCEDURE — 80053 COMPREHEN METABOLIC PANEL: CPT

## 2023-06-25 PROCEDURE — 99284 EMERGENCY DEPT VISIT MOD MDM: CPT | Mod: 25

## 2023-06-25 PROCEDURE — 74177 CT ABD & PELVIS W/CONTRAST: CPT | Mod: 26,ME

## 2023-06-25 PROCEDURE — 87086 URINE CULTURE/COLONY COUNT: CPT

## 2023-06-25 PROCEDURE — 74177 CT ABD & PELVIS W/CONTRAST: CPT | Mod: ME

## 2023-06-25 PROCEDURE — 36415 COLL VENOUS BLD VENIPUNCTURE: CPT

## 2023-06-25 PROCEDURE — 85025 COMPLETE CBC W/AUTO DIFF WBC: CPT

## 2023-06-25 PROCEDURE — G1004: CPT

## 2023-06-25 PROCEDURE — 81001 URINALYSIS AUTO W/SCOPE: CPT

## 2023-06-25 PROCEDURE — 96374 THER/PROPH/DIAG INJ IV PUSH: CPT | Mod: XU

## 2023-06-25 PROCEDURE — 83690 ASSAY OF LIPASE: CPT

## 2023-06-25 PROCEDURE — 99285 EMERGENCY DEPT VISIT HI MDM: CPT

## 2023-06-25 RX ORDER — SODIUM CHLORIDE 9 MG/ML
1000 INJECTION INTRAMUSCULAR; INTRAVENOUS; SUBCUTANEOUS ONCE
Refills: 0 | Status: COMPLETED | OUTPATIENT
Start: 2023-06-25 | End: 2023-06-25

## 2023-06-25 RX ORDER — ACETAMINOPHEN 500 MG
1000 TABLET ORAL ONCE
Refills: 0 | Status: COMPLETED | OUTPATIENT
Start: 2023-06-25 | End: 2023-06-25

## 2023-06-25 RX ADMIN — SODIUM CHLORIDE 1000 MILLILITER(S): 9 INJECTION INTRAMUSCULAR; INTRAVENOUS; SUBCUTANEOUS at 11:25

## 2023-06-25 RX ADMIN — Medication 400 MILLIGRAM(S): at 11:52

## 2023-06-25 NOTE — ED STATDOCS - NSFOLLOWUPINSTRUCTIONS_ED_ALL_ED_FT
Diverticulitis    WHAT YOU NEED TO KNOW:    Diverticulitis is a condition that causes small pockets along your intestine called diverticula to become inflamed or infected. This is caused by hard bowel movements, food, or bacteria that get stuck in the pockets.         DISCHARGE INSTRUCTIONS:    Return to the emergency department if:     You have bowel movement or foul-smelling discharge leaking from your vagina or in your urine.      You have severe diarrhea.      You urinate less than usual or not at all.      You are not able to have a bowel movement.      You cannot stop vomiting.       You have severe abdominal pain, a fever, and your abdomen is larger than usual.       You have new or increased blood in your bowel movements.     Contact your healthcare provider if:     You have pain when you urinate.      Your symptoms get worse or do not go away.       You have questions or concerns about your condition or care.     Medicines:     Antibiotics may be given to help treat a bacterial infection.      Prescription pain medicine may be given. Ask your healthcare provider how to take this medicine safely. Some prescription pain medicines contain acetaminophen. Do not take other medicines that contain acetaminophen without talking to your healthcare provider. Too much acetaminophen may cause liver damage. Prescription pain medicine may cause constipation. Ask your healthcare provider how to prevent or treat constipation.       Take your medicine as directed. Contact your healthcare provider if you think your medicine is not helping or if you have side effects. Tell him or her if you are allergic to any medicine. Keep a list of the medicines, vitamins, and herbs you take. Include the amounts, and when and why you take them. Bring the list or the pill bottles to follow-up visits. Carry your medicine list with you in case of an emergency.    Clear liquid diet: A clear liquid diet includes any liquids that you can see through. Examples include water, ginger-melvin, cranberry or apple juice, frozen fruit ice, or broth. Stay on a clear liquid diet until your symptoms are gone, or as directed.     Follow up with your healthcare provider as directed: You may need to return for a colonoscopy. When your symptoms are gone, you may need a low-fat, high-fiber diet to prevent diverticulitis from developing again. Your healthcare provider or dietitian can help you create meal plans. Write down your questions so you remember to ask them during your visits.

## 2023-06-25 NOTE — ED STATDOCS - NSICDXPASTSURGICALHX_GEN_ALL_CORE_FT
PAST SURGICAL HISTORY:  Encounter for insertion of venous access port     H/O removal of cyst ovarian cyst    H/O right breast implant s/p mastectomy    H/O total mastectomy of right breast 2003    History of endometrial ablation     S/P breast biopsy, left Mastectomy/DMITRI

## 2023-06-25 NOTE — ED STATDOCS - NS_ ATTENDINGSCRIBEDETAILS _ED_A_ED_FT
I, Marco Shabazz DO,  performed the initial face to face bedside interview with this patient regarding history of present illness, review of symptoms and relevant past medical, social and family history.  I completed an independent physical examination.  I was the initial provider who evaluated this patient.   I personally saw the patient and performed a substantive portion of the visit including all aspects of the medical decision making.  The history, relevant review of systems, past medical and surgical history, medical decision making, and physical examination was documented by the scribe in my presence and I attest to the accuracy of the documentation.

## 2023-06-25 NOTE — ED STATDOCS - CLINICAL SUMMARY MEDICAL DECISION MAKING FREE TEXT BOX
57 year old female p/w lower pelvic pain radiating to the back. Overall well appearing. Diffuse abd tenderness without rebound, does not appear to be surgical abdomen. Plan for IV, labs, urinalysis, CT A/P, reassess.

## 2023-06-25 NOTE — ED STATDOCS - OBJECTIVE STATEMENT
58 y/o male w/PMHx of Breast CA dx 16 years ago s/p mastectomy and previously on chemo, Ovarian cyst, Endometriosis, diverticulitis presents to ED c/o persistent pelvic pain radiating to lower back onset two days ago. last night pt woke up diaphoretic from night sweats with lower abdominal pain, headache, nausea, and body aches. pt able to tolerate PO. denies dysuria but reports pressure upon urination. pt on arimidex. last seen gynecologist 2 months ago with no significant findings. NKDA. no tobacco use.

## 2023-06-25 NOTE — ED STATDOCS - NSICDXPASTMEDICALHX_GEN_ALL_CORE_FT
PAST MEDICAL HISTORY:  Breast CA     Endometriosis     Ovarian cyst     Phlebitis RUE superficial 10/2019, treated with Xarelto

## 2023-06-25 NOTE — ED ADULT NURSE NOTE - OBJECTIVE STATEMENT
Patient is a 57y old female, alert and oriented X3, presenting to the ER with flu-like symptoms. Patient reports "Friday I felt fatigue. Saturday I woke up with pain in my stomach. In the middle of the night I woke up drenched in sweat and clammy. I feel better after I eat something." Patient denies CP, SOB at this time, fevers, vomiting, burning on urination, hematuria or blood in stool.   Hx: Breast Cancer.   20GA IV placed in the left AC, blood work and urine collected and sent to the lab.

## 2023-06-25 NOTE — ED STATDOCS - ATTENDING APP SHARED VISIT CONTRIBUTION OF CARE
I, Marco Shabazz DO, personally saw the patient with ACP.  I have personally performed a face to face diagnostic evaluation on this patient.  I have reviewed the ACP note and agree with the history, exam, and plan of care, except as noted.   The initial assessment was performed by myself and then the patient was handed off to the ACP. The patient was followed and re-evaluated by the ACP. All labs, imaging and procedures were evaluated and performed by the ACP and I was available for consultation if any questions in the patients care came up.

## 2023-06-25 NOTE — ED STATDOCS - NS ED ATTENDING STATEMENT MOD
This was a shared visit with the TYSON. I reviewed and verified the documentation and independently performed the documented:

## 2023-06-25 NOTE — ED STATDOCS - DIFFERENTIAL DIAGNOSIS
Including but not limited to: diverticulitis, colitis, UTI, less likely pelvic pathology. Differential Diagnosis

## 2023-06-25 NOTE — ED STATDOCS - PROGRESS NOTE DETAILS
56 y/o F with PMH of breast CA s/p mastectomy and breast reconstruction x2 presents with lower abdominal pain, body aches, chills, night sweats x 2 days. States pain radiates from lower abdomen to back. Denies fever, nausea, vomiting, constipation. +urinary frequency. PE: Well appearing. Cardiac: s1s2, RRR. lungs; CTAB. Abdomen: NBS x4, soft, +diffuse tenderness, worse in lower abdomen. No CVAT. A/P: r/o appendicitis, uti/pyelonephritis, diverticulitis. plan for labs,. CT, reassess. - Grant Pool PA-C Labs and imaging reviewed with patient. Will treat as diverticulitis. Plan for dc with PO antibiotics. - Grant Pool PA-C

## 2023-06-25 NOTE — ED ADULT TRIAGE NOTE - STATUS:
Applied Presents with c/o diff breathing, nasal congestion, sore throat for 4 days. Others in his home with same illness that started today.

## 2023-06-25 NOTE — ED ADULT TRIAGE NOTE - CHIEF COMPLAINT QUOTE
pt c/o abdominal pain, cough, body aches, nausea, night sweats. denies cp/sob. pmh : breast CA, radiation 2018

## 2023-06-25 NOTE — ED ADULT NURSE NOTE - NS ED NURSE DC INFO COMPLEXITY
Impression: Tributary (branch) retinal vein occlusion, right eye, stable: O00.0448 Right. Plan: --exam reveals an old inferotemporal BRVO OD
--OCT reveals no evidence of CME
--FA shows delay in A/V transit in I/T quad OD, no NVD/E
--findings/diagnosis d/w pt in detail --treatment not indicated at this time --pt to see PCP within 2 weeks for BP/lipid monitoring --pt will see Dr. Bobbi Trivedi annually for monitoring --pt instructed to call immediately with s/s decreased vision/pain RTC PRN Simple: Patient demonstrates quick and easy understanding/Verbalized Understanding

## 2023-06-25 NOTE — ED STATDOCS - PATIENT PORTAL LINK FT
You can access the FollowMyHealth Patient Portal offered by [facility] by registering at the following website: [email or web address]. By joining [facility]’s FollowMyHealth portal, you will also be able to view your health information using other applications (apps) compatible with our system.

## 2023-06-25 NOTE — ED ADULT NURSE NOTE - NSFALLUNIVINTERV_ED_ALL_ED
Bed/Stretcher in lowest position, wheels locked, appropriate side rails in place/Call bell, personal items and telephone in reach/Instruct patient to call for assistance before getting out of bed/chair/stretcher/Non-slip footwear applied when patient is off stretcher/[place] to call system/Physically safe environment - no spills, clutter or unnecessary equipment/Purposeful proactive rounding/Room/bathroom lighting operational, light cord in reach

## 2023-06-26 LAB
CULTURE RESULTS: SIGNIFICANT CHANGE UP
SPECIMEN SOURCE: SIGNIFICANT CHANGE UP

## 2023-08-11 ENCOUNTER — OUTPATIENT (OUTPATIENT)
Dept: OUTPATIENT SERVICES | Facility: HOSPITAL | Age: 58
LOS: 1 days | Discharge: HOME | End: 2023-08-11

## 2023-08-11 DIAGNOSIS — Z98.82 BREAST IMPLANT STATUS: Chronic | ICD-10-CM

## 2023-08-11 DIAGNOSIS — C50.512 MALIGNANT NEOPLASM OF LOWER-OUTER QUADRANT OF LEFT FEMALE BREAST: ICD-10-CM

## 2023-08-11 DIAGNOSIS — Z45.2 ENCOUNTER FOR ADJUSTMENT AND MANAGEMENT OF VASCULAR ACCESS DEVICE: Chronic | ICD-10-CM

## 2023-08-11 DIAGNOSIS — Z98.890 OTHER SPECIFIED POSTPROCEDURAL STATES: Chronic | ICD-10-CM

## 2023-08-11 DIAGNOSIS — Z90.11 ACQUIRED ABSENCE OF RIGHT BREAST AND NIPPLE: Chronic | ICD-10-CM

## 2023-08-13 NOTE — OB HISTORY
[Currently In Menopause] : currently in menopause [Menopause Age: ____] : age at menopause was [unfilled] [___] : Living: [unfilled] Fall Risk;

## 2023-08-14 ENCOUNTER — RESULT REVIEW (OUTPATIENT)
Age: 58
End: 2023-08-14

## 2023-08-14 ENCOUNTER — APPOINTMENT (OUTPATIENT)
Age: 58
End: 2023-08-14
Payer: COMMERCIAL

## 2023-08-14 LAB
BASOPHILS # BLD AUTO: 0.11 K/UL — SIGNIFICANT CHANGE UP (ref 0–0.2)
BASOPHILS NFR BLD AUTO: 1.4 % — SIGNIFICANT CHANGE UP (ref 0–2)
EOSINOPHIL # BLD AUTO: 0.21 K/UL — SIGNIFICANT CHANGE UP (ref 0–0.5)
EOSINOPHIL NFR BLD AUTO: 2.6 % — SIGNIFICANT CHANGE UP (ref 0–6)
HCT VFR BLD CALC: 39.4 % — SIGNIFICANT CHANGE UP (ref 34.5–45)
HGB BLD-MCNC: 12.8 G/DL — SIGNIFICANT CHANGE UP (ref 11.5–15.5)
IMM GRANULOCYTES NFR BLD AUTO: 0.4 % — SIGNIFICANT CHANGE UP (ref 0–0.9)
LYMPHOCYTES # BLD AUTO: 2.37 K/UL — SIGNIFICANT CHANGE UP (ref 1–3.3)
LYMPHOCYTES # BLD AUTO: 29.3 % — SIGNIFICANT CHANGE UP (ref 13–44)
MCHC RBC-ENTMCNC: 28.8 PG — SIGNIFICANT CHANGE UP (ref 27–34)
MCHC RBC-ENTMCNC: 32.5 G/DL — SIGNIFICANT CHANGE UP (ref 32–36)
MCV RBC AUTO: 88.5 FL — SIGNIFICANT CHANGE UP (ref 80–100)
MONOCYTES # BLD AUTO: 0.57 K/UL — SIGNIFICANT CHANGE UP (ref 0–0.9)
MONOCYTES NFR BLD AUTO: 7 % — SIGNIFICANT CHANGE UP (ref 2–14)
NEUTROPHILS # BLD AUTO: 4.81 K/UL — SIGNIFICANT CHANGE UP (ref 1.8–7.4)
NEUTROPHILS NFR BLD AUTO: 59.3 % — SIGNIFICANT CHANGE UP (ref 43–77)
NRBC # BLD: 0 /100 WBCS — SIGNIFICANT CHANGE UP (ref 0–0)
PLATELET # BLD AUTO: 314 K/UL — SIGNIFICANT CHANGE UP (ref 150–400)
RBC # BLD: 4.45 M/UL — SIGNIFICANT CHANGE UP (ref 3.8–5.2)
RBC # FLD: 13.4 % — SIGNIFICANT CHANGE UP (ref 10.3–14.5)
WBC # BLD: 8.1 K/UL — SIGNIFICANT CHANGE UP (ref 3.8–10.5)
WBC # FLD AUTO: 8.1 K/UL — SIGNIFICANT CHANGE UP (ref 3.8–10.5)

## 2023-08-14 PROCEDURE — 99215 OFFICE O/P EST HI 40 MIN: CPT

## 2023-08-14 NOTE — PHYSICAL EXAM
[Fully active, able to carry on all pre-disease performance without restriction] : Status 0 - Fully active, able to carry on all pre-disease performance without restriction [Normal] : normal appearance, no rash, nodules, vesicles, ulcers, erythema [de-identified] : bilateral mastectomy ; bilateral DMITRI reconstruction, scars well healed.No nipple reconstruction; left breast slightly larger than the right [de-identified] : transverse abdominal incision healed; right sided wound opening with minimal serosanguineous secretions; left sided drain catheter in place, draining small amount of serosanguineous fluid

## 2023-08-14 NOTE — HISTORY OF PRESENT ILLNESS
[Disease: _____________________] : Disease: [unfilled] [T: ___] : T[unfilled] [N: ___] : N[unfilled] [AJCC Stage: ____] : AJCC Stage: [unfilled] [de-identified] : 58F with history right breast cancer at age 37, s/p right mastectomy with saline implant reconstruction, and nipple reconstruction with left nipple graft, s/p adjuvant chemotherapy x 6 months (Dr. Rene White), referred for medical oncologic consultation after diagnosis of left breast carcinoma in September 2019.   CASE SYNOPSIS:  5/29/03- segmental resection right breast for moderately differentiated infiltrating ductal carcinoma; margin involved by infiltrating tumor.  7/10/03- right total mastectomy with saline implant reconstruction( Jak Townsend/ Benny Grover). Received adjuvant systemic chemotherapy ( records to be obtained), under the care of Dr. Don White.  10/2018- reports lower left breast itching.  10/31/18- mammogram/ breast US: no suspicious lesions, no evidence of malignancy, intramammary lymph node 2:00 left breast.  6/24/19 MRI breast - right implant intact; left breast 5:00 N8, 2 adjacent masses 1, and 1.5 cm; abnormal clumped enhancement extending from masses medially, multiple additional masses throughout left breast: 6:00 N4 8 mm, 3:00 N6 1 cm, 2:00 N6 7 mm, 12:00 N6 9 mm, 10:00 N4 8 mm. Multiple small axillary lymph nodes.  6/25/19- DEXA scan: low bone mass based on left femoral neck T score (-1.2 SD from the mean).  7/9/19: left breast US and US-guided core biopsy; left axilla with sonographically normal lymph node.  Left 4-5:00 N6 9 x 7 x 10 mm and irregular inferior mass 18 x 9 x 14 mm. Pathology:  Left 4-5:00 superior- infiltrating ductal carcinoma, SBR 6/9, ER90%, AR 90%, Her 2 negative.  Left 4-5:00 inferior - infiltrating ductal carcinoma, SBR 6/9, ER90%, AR 70%, Her 2 negative.  9/12/19: left mastectomy with sentinel node biopsy and removal of both implants with bilateral DMITRI flap reconstruction.  Pathology: left breast with 2 foci of invasive ductal carcinoma 2.5 cm and 1.2 cm , SBR 6/9, with foci of DCIS in UOQ, + LVI and PNI, margins negative, 1 / 2 SLN positive ( 8 mm ; no extracapsular extension), 2 more negative non- sentinel LN,  right and left internal mammary nodes negative.(pT2, pN1, Mx = stage IIB)  10/1/19- develops venous thrombosis in the basilic vein, superficial, extended for at least 10 cm.  LYMPHA procedure postponed. Started on anticoagulation.  10/2/19: PET/CT consistent with FDG avidity associated with the reconstructed bilateral breast in the anterior abdominal wall; no FDG avid local regional or distant metastatic disease.  10/17/19 - chemo- education session.  11/5/19- 2/18/20- completes 6 cycles Docetaxel/ Cyclophosphamide.  4/6- 5/8/20-  XRT left chest wall.  5/8/20- starts anastrozole.  7/10/2020: Bone scan-no radionuclide evidence of osseous metastases.   7/22/2020: CTA-negative for pulmonary embolism; left upper lobe peripheral groundglass opacity, possibly infectious or inflammatory.  7/21/21- right and left breast revision reconstruction, reconstruction of right inframammary fold with external Ru skin advancement flap; removal of right chest Mediport, excision of the left lower abdominal wall donor site subcutaneous mass with wound closure and revision of left hip donor site scar deformity.  Pathology consistent with fat necrosis and unremarkable skin ( Dr. Rodriguez).  6/29/22- FNA left breast 9:00: negative for malignancy.  4/4/23 bone density: Osteopenia  6/25/2023 CT A/P: focal wall thickening with mild stranding in the adjacent peritoneal fat noted in the mid sigmoid colon, likely focal diverticulitis versus focal colitis. No loculated collections to suggest an abscess. No evidence of perforation.  [de-identified] : infiltrating ductal carcinoma [de-identified] : ER 90%, WA 90%, Her 2 negative [FreeTextEntry1] : docetaxel/ cyclophosphamide\par  Anastrozole-started 5/8/2020 [de-identified] : Returning for follow-up; since the past 3 years patient is compliant with adjuvant endocrine therapy (anastrozole) started 5/8/2020.   Patient stable clinically during the past 6month since last visit in office; follows up with Dr. Liu and Dr. Faust (GYN) regularly.   No new images available for review.  Denies pain, reports minimal arthralgia.  No new changes in medication list.   Recently hospitalized with colitis; CT A/P 6/2323/25/2023 showed focal wall thickening with mild stranding in the adjacent peritoneal fat noted in the mid sigmoid colon, likely focal diverticulitis versus focal colitis. No loculated collections to suggest an abscess. No evidence of perforation. Physical examination and hematologic profile unchanged.  Patient complaining of left chest wall pain due to chronic cough, likely related to GERD.  Chest x-ray in January 2023 was negative for active pulmonary disease.

## 2023-08-14 NOTE — RESULTS/DATA
[FreeTextEntry1] : I reviewed today's and recent blood work results with patient.  5/28/2020: WBC 6, hemoglobin 12.5 g/dL, hematocrit 40.1%, platelet 333,000   2/18/20: WBC 13.54 K, hemoglobin 11 g/ dL, hematocrit  34.3 %, platelet  444,000  1/7/20: WBC 10.56 K, hemoglobin 11.5 g/ dL, hematocrit 35.6 %, platelet  504,000  11/5/19: WBC 14.62, hemoglobin 12.7 g/dL, hematocrit 39.1%, platelets 481 pounds  9/13/19:  WBC 16.23, hemoglobin 10.7 g/dL, hematocrit 33%, platelet 343,000

## 2023-08-15 LAB
25(OH)D3 SERPL-MCNC: 36.9 NG/ML
ALBUMIN SERPL ELPH-MCNC: 4.2 G/DL
ALP BLD-CCNC: 59 U/L
ALT SERPL-CCNC: 11 U/L
ANION GAP SERPL CALC-SCNC: 14 MMOL/L
AST SERPL-CCNC: 12 U/L
BILIRUB SERPL-MCNC: 0.2 MG/DL
BUN SERPL-MCNC: 18 MG/DL
CALCIUM SERPL-MCNC: 9.2 MG/DL
CHLORIDE SERPL-SCNC: 103 MMOL/L
CO2 SERPL-SCNC: 24 MMOL/L
CREAT SERPL-MCNC: 0.95 MG/DL
EGFR: 69 ML/MIN/1.73M2
GLUCOSE SERPL-MCNC: 125 MG/DL
POTASSIUM SERPL-SCNC: 4.3 MMOL/L
PROT SERPL-MCNC: 7 G/DL
SODIUM SERPL-SCNC: 141 MMOL/L

## 2023-08-20 NOTE — HISTORY OF PRESENT ILLNESS
Statement Selected [Disease: _____________________] : Disease: [unfilled] [N: ___] : N[unfilled] [T: ___] : T[unfilled] [AJCC Stage: ____] : AJCC Stage: [unfilled] [de-identified] : 54F with history right breast cancer at age 37, s/p right mastectomy with saline implant reconstruction, and nipple reconstruction with left nipple graft, s/p adjuvant chemotherapy x 6 months (Dr. Rene White), referred for medical oncologic consultation after diagnosis of left breast carcinoma in September 2019. \par \par CASE SYNOPSIS: \par 5/29/03- segmental resection right breast for moderately differentiated infiltrating ductal carcinoma; margin involved by infiltrating tumor.\par \par 7/10/03- right total mastectomy with saline implant reconstruction( Meghna Townsend/ Benny Grover). Received adjuvant systemic chemotherapy ( records to be obtained), under the care of Dr. Dno White.\par \par 10/2018- reports lower left breast itching.\par \par 10/31/18- mammogram/ breast US: no suspicious lesions, no evidence of malignancy, intramammary lymph node 2:00 left breast.\par \par 6/24/19 MRI breast – right implant intact; left breast 5:00 N8, 2 adjacent masses 1, and 1.5 cm; abnormal clumped enhancement extending from masses medially, multiple additional masses throughout left breast: 6:00 N4 8 mm, 3:00 N6 1 cm, 2:00 N6 7 mm, 12:00 N6 9 mm, 10:00 N4 8 mm. Multiple small axillary lymph nodes.\par \par 6/25/19- DEXA scan: low bone mass based on left femoral neck T score (-1.2 SD from the mean).\par \par 7/9/19: left breast US and US-guided core biopsy; left axilla with sonographically normal lymph node. \par Left 4-5:00 N6 9 x 7 x 10 mm and irregular inferior mass 18 x 9 x 14 mm. Pathology: \par Left 4-5:00 superior- infiltrating ductal carcinoma, SBR 6/9, ER90%, SC 90%, Her 2 negative. \par Left 4-5:00 inferior - infiltrating ductal carcinoma, SBR 6/9, ER90%, SC 70%, Her 2 negative.\par \par 9/12/19: left mastectomy with sentinel node biopsy and removal of both implants with bilateral DMITRI flap reconstruction. \par Pathology: left breast with 2 foci of invasive ductal carcinoma 2.5 cm and 1.2 cm , SBR 6/9, with foci of DCIS in UOQ, + LVI and PNI, margins negative, 1 / 2 SLN positive ( 8 mm ; no extracapsular extension), 2 more negative non- sentinel LN,  right and left internal mammary nodes negative.(pT2, pN1, Mx = stage IIB)\par \par Recovered well postoperatively. Has difficulty finding a position of comfort after DMITRI, and hence complaining of insomnia. Still taking pain medication. Gives a family history of breast cancer (mother, age 70) and maternal aunt with carcinoma of uterus.\par  [de-identified] : infiltrating ductal carcinoma [de-identified] : ER 90%, MN 90%, Her 2 negative [FreeTextEntry1] : axillary dissection scheduled for October 4, 2019

## 2023-10-02 ENCOUNTER — APPOINTMENT (OUTPATIENT)
Dept: OBGYN | Facility: CLINIC | Age: 58
End: 2023-10-02
Payer: COMMERCIAL

## 2023-10-02 VITALS
WEIGHT: 210 LBS | BODY MASS INDEX: 31.1 KG/M2 | HEIGHT: 69 IN | DIASTOLIC BLOOD PRESSURE: 70 MMHG | SYSTOLIC BLOOD PRESSURE: 112 MMHG

## 2023-10-02 DIAGNOSIS — Z85.3 PERSONAL HISTORY OF MALIGNANT NEOPLASM OF BREAST: ICD-10-CM

## 2023-10-02 PROCEDURE — 99214 OFFICE O/P EST MOD 30 MIN: CPT

## 2023-10-16 ENCOUNTER — RX RENEWAL (OUTPATIENT)
Age: 58
End: 2023-10-16

## 2023-10-24 ENCOUNTER — EMERGENCY (EMERGENCY)
Facility: HOSPITAL | Age: 58
LOS: 0 days | Discharge: HOME | End: 2023-10-24
Attending: STUDENT IN AN ORGANIZED HEALTH CARE EDUCATION/TRAINING PROGRAM
Payer: COMMERCIAL

## 2023-10-24 VITALS — HEIGHT: 69 IN | WEIGHT: 205.91 LBS

## 2023-10-24 VITALS
TEMPERATURE: 98 F | RESPIRATION RATE: 17 BRPM | HEART RATE: 68 BPM | DIASTOLIC BLOOD PRESSURE: 68 MMHG | SYSTOLIC BLOOD PRESSURE: 123 MMHG | OXYGEN SATURATION: 100 %

## 2023-10-24 DIAGNOSIS — Z98.890 OTHER SPECIFIED POSTPROCEDURAL STATES: Chronic | ICD-10-CM

## 2023-10-24 DIAGNOSIS — Z90.11 ACQUIRED ABSENCE OF RIGHT BREAST AND NIPPLE: ICD-10-CM

## 2023-10-24 DIAGNOSIS — Z98.82 BREAST IMPLANT STATUS: ICD-10-CM

## 2023-10-24 DIAGNOSIS — R10.32 LEFT LOWER QUADRANT PAIN: ICD-10-CM

## 2023-10-24 DIAGNOSIS — Z45.2 ENCOUNTER FOR ADJUSTMENT AND MANAGEMENT OF VASCULAR ACCESS DEVICE: Chronic | ICD-10-CM

## 2023-10-24 DIAGNOSIS — K57.92 DIVERTICULITIS OF INTESTINE, PART UNSPECIFIED, WITHOUT PERFORATION OR ABSCESS WITHOUT BLEEDING: ICD-10-CM

## 2023-10-24 DIAGNOSIS — N80.9 ENDOMETRIOSIS, UNSPECIFIED: ICD-10-CM

## 2023-10-24 DIAGNOSIS — Z90.11 ACQUIRED ABSENCE OF RIGHT BREAST AND NIPPLE: Chronic | ICD-10-CM

## 2023-10-24 DIAGNOSIS — R11.0 NAUSEA: ICD-10-CM

## 2023-10-24 DIAGNOSIS — Z98.82 BREAST IMPLANT STATUS: Chronic | ICD-10-CM

## 2023-10-24 DIAGNOSIS — Z85.3 PERSONAL HISTORY OF MALIGNANT NEOPLASM OF BREAST: ICD-10-CM

## 2023-10-24 LAB
ALBUMIN SERPL ELPH-MCNC: 3.4 G/DL — SIGNIFICANT CHANGE UP (ref 3.3–5)
ALBUMIN SERPL ELPH-MCNC: 3.4 G/DL — SIGNIFICANT CHANGE UP (ref 3.3–5)
ALP SERPL-CCNC: 62 U/L — SIGNIFICANT CHANGE UP (ref 40–120)
ALP SERPL-CCNC: 62 U/L — SIGNIFICANT CHANGE UP (ref 40–120)
ALT FLD-CCNC: 32 U/L — SIGNIFICANT CHANGE UP (ref 12–78)
ALT FLD-CCNC: 32 U/L — SIGNIFICANT CHANGE UP (ref 12–78)
ANION GAP SERPL CALC-SCNC: 6 MMOL/L — SIGNIFICANT CHANGE UP (ref 5–17)
ANION GAP SERPL CALC-SCNC: 6 MMOL/L — SIGNIFICANT CHANGE UP (ref 5–17)
APPEARANCE UR: CLEAR — SIGNIFICANT CHANGE UP
APPEARANCE UR: CLEAR — SIGNIFICANT CHANGE UP
AST SERPL-CCNC: 17 U/L — SIGNIFICANT CHANGE UP (ref 15–37)
AST SERPL-CCNC: 17 U/L — SIGNIFICANT CHANGE UP (ref 15–37)
BASOPHILS # BLD AUTO: 0.13 K/UL — SIGNIFICANT CHANGE UP (ref 0–0.2)
BASOPHILS # BLD AUTO: 0.13 K/UL — SIGNIFICANT CHANGE UP (ref 0–0.2)
BASOPHILS NFR BLD AUTO: 1.6 % — SIGNIFICANT CHANGE UP (ref 0–2)
BASOPHILS NFR BLD AUTO: 1.6 % — SIGNIFICANT CHANGE UP (ref 0–2)
BILIRUB SERPL-MCNC: 0.3 MG/DL — SIGNIFICANT CHANGE UP (ref 0.2–1.2)
BILIRUB SERPL-MCNC: 0.3 MG/DL — SIGNIFICANT CHANGE UP (ref 0.2–1.2)
BILIRUB UR-MCNC: NEGATIVE — SIGNIFICANT CHANGE UP
BILIRUB UR-MCNC: NEGATIVE — SIGNIFICANT CHANGE UP
BUN SERPL-MCNC: 12 MG/DL — SIGNIFICANT CHANGE UP (ref 7–23)
BUN SERPL-MCNC: 12 MG/DL — SIGNIFICANT CHANGE UP (ref 7–23)
CALCIUM SERPL-MCNC: 9 MG/DL — SIGNIFICANT CHANGE UP (ref 8.5–10.1)
CALCIUM SERPL-MCNC: 9 MG/DL — SIGNIFICANT CHANGE UP (ref 8.5–10.1)
CHLORIDE SERPL-SCNC: 105 MMOL/L — SIGNIFICANT CHANGE UP (ref 96–108)
CHLORIDE SERPL-SCNC: 105 MMOL/L — SIGNIFICANT CHANGE UP (ref 96–108)
CO2 SERPL-SCNC: 26 MMOL/L — SIGNIFICANT CHANGE UP (ref 22–31)
CO2 SERPL-SCNC: 26 MMOL/L — SIGNIFICANT CHANGE UP (ref 22–31)
COLOR SPEC: YELLOW — SIGNIFICANT CHANGE UP
COLOR SPEC: YELLOW — SIGNIFICANT CHANGE UP
CREAT SERPL-MCNC: 0.9 MG/DL — SIGNIFICANT CHANGE UP (ref 0.5–1.3)
CREAT SERPL-MCNC: 0.9 MG/DL — SIGNIFICANT CHANGE UP (ref 0.5–1.3)
DIFF PNL FLD: NEGATIVE — SIGNIFICANT CHANGE UP
DIFF PNL FLD: NEGATIVE — SIGNIFICANT CHANGE UP
EGFR: 74 ML/MIN/1.73M2 — SIGNIFICANT CHANGE UP
EGFR: 74 ML/MIN/1.73M2 — SIGNIFICANT CHANGE UP
EOSINOPHIL # BLD AUTO: 0.14 K/UL — SIGNIFICANT CHANGE UP (ref 0–0.5)
EOSINOPHIL # BLD AUTO: 0.14 K/UL — SIGNIFICANT CHANGE UP (ref 0–0.5)
EOSINOPHIL NFR BLD AUTO: 1.7 % — SIGNIFICANT CHANGE UP (ref 0–6)
EOSINOPHIL NFR BLD AUTO: 1.7 % — SIGNIFICANT CHANGE UP (ref 0–6)
GLUCOSE SERPL-MCNC: 97 MG/DL — SIGNIFICANT CHANGE UP (ref 70–99)
GLUCOSE SERPL-MCNC: 97 MG/DL — SIGNIFICANT CHANGE UP (ref 70–99)
GLUCOSE UR QL: NEGATIVE MG/DL — SIGNIFICANT CHANGE UP
GLUCOSE UR QL: NEGATIVE MG/DL — SIGNIFICANT CHANGE UP
HCT VFR BLD CALC: 41 % — SIGNIFICANT CHANGE UP (ref 34.5–45)
HCT VFR BLD CALC: 41 % — SIGNIFICANT CHANGE UP (ref 34.5–45)
HGB BLD-MCNC: 13.5 G/DL — SIGNIFICANT CHANGE UP (ref 11.5–15.5)
HGB BLD-MCNC: 13.5 G/DL — SIGNIFICANT CHANGE UP (ref 11.5–15.5)
IMM GRANULOCYTES NFR BLD AUTO: 0.4 % — SIGNIFICANT CHANGE UP (ref 0–0.9)
IMM GRANULOCYTES NFR BLD AUTO: 0.4 % — SIGNIFICANT CHANGE UP (ref 0–0.9)
KETONES UR-MCNC: NEGATIVE MG/DL — SIGNIFICANT CHANGE UP
KETONES UR-MCNC: NEGATIVE MG/DL — SIGNIFICANT CHANGE UP
LEUKOCYTE ESTERASE UR-ACNC: NEGATIVE — SIGNIFICANT CHANGE UP
LEUKOCYTE ESTERASE UR-ACNC: NEGATIVE — SIGNIFICANT CHANGE UP
LIDOCAIN IGE QN: 29 U/L — SIGNIFICANT CHANGE UP (ref 13–75)
LIDOCAIN IGE QN: 29 U/L — SIGNIFICANT CHANGE UP (ref 13–75)
LYMPHOCYTES # BLD AUTO: 2.58 K/UL — SIGNIFICANT CHANGE UP (ref 1–3.3)
LYMPHOCYTES # BLD AUTO: 2.58 K/UL — SIGNIFICANT CHANGE UP (ref 1–3.3)
LYMPHOCYTES # BLD AUTO: 31.9 % — SIGNIFICANT CHANGE UP (ref 13–44)
LYMPHOCYTES # BLD AUTO: 31.9 % — SIGNIFICANT CHANGE UP (ref 13–44)
MAGNESIUM SERPL-MCNC: 2.1 MG/DL — SIGNIFICANT CHANGE UP (ref 1.6–2.6)
MAGNESIUM SERPL-MCNC: 2.1 MG/DL — SIGNIFICANT CHANGE UP (ref 1.6–2.6)
MCHC RBC-ENTMCNC: 29.5 PG — SIGNIFICANT CHANGE UP (ref 27–34)
MCHC RBC-ENTMCNC: 29.5 PG — SIGNIFICANT CHANGE UP (ref 27–34)
MCHC RBC-ENTMCNC: 32.9 GM/DL — SIGNIFICANT CHANGE UP (ref 32–36)
MCHC RBC-ENTMCNC: 32.9 GM/DL — SIGNIFICANT CHANGE UP (ref 32–36)
MCV RBC AUTO: 89.5 FL — SIGNIFICANT CHANGE UP (ref 80–100)
MCV RBC AUTO: 89.5 FL — SIGNIFICANT CHANGE UP (ref 80–100)
MONOCYTES # BLD AUTO: 0.68 K/UL — SIGNIFICANT CHANGE UP (ref 0–0.9)
MONOCYTES # BLD AUTO: 0.68 K/UL — SIGNIFICANT CHANGE UP (ref 0–0.9)
MONOCYTES NFR BLD AUTO: 8.4 % — SIGNIFICANT CHANGE UP (ref 2–14)
MONOCYTES NFR BLD AUTO: 8.4 % — SIGNIFICANT CHANGE UP (ref 2–14)
NEUTROPHILS # BLD AUTO: 4.54 K/UL — SIGNIFICANT CHANGE UP (ref 1.8–7.4)
NEUTROPHILS # BLD AUTO: 4.54 K/UL — SIGNIFICANT CHANGE UP (ref 1.8–7.4)
NEUTROPHILS NFR BLD AUTO: 56 % — SIGNIFICANT CHANGE UP (ref 43–77)
NEUTROPHILS NFR BLD AUTO: 56 % — SIGNIFICANT CHANGE UP (ref 43–77)
NITRITE UR-MCNC: NEGATIVE — SIGNIFICANT CHANGE UP
NITRITE UR-MCNC: NEGATIVE — SIGNIFICANT CHANGE UP
PH UR: 5.5 — SIGNIFICANT CHANGE UP (ref 5–8)
PH UR: 5.5 — SIGNIFICANT CHANGE UP (ref 5–8)
PLATELET # BLD AUTO: 402 K/UL — HIGH (ref 150–400)
PLATELET # BLD AUTO: 402 K/UL — HIGH (ref 150–400)
POTASSIUM SERPL-MCNC: 4 MMOL/L — SIGNIFICANT CHANGE UP (ref 3.5–5.3)
POTASSIUM SERPL-MCNC: 4 MMOL/L — SIGNIFICANT CHANGE UP (ref 3.5–5.3)
POTASSIUM SERPL-SCNC: 4 MMOL/L — SIGNIFICANT CHANGE UP (ref 3.5–5.3)
POTASSIUM SERPL-SCNC: 4 MMOL/L — SIGNIFICANT CHANGE UP (ref 3.5–5.3)
PROT SERPL-MCNC: 8.1 GM/DL — SIGNIFICANT CHANGE UP (ref 6–8.3)
PROT SERPL-MCNC: 8.1 GM/DL — SIGNIFICANT CHANGE UP (ref 6–8.3)
PROT UR-MCNC: NEGATIVE MG/DL — SIGNIFICANT CHANGE UP
PROT UR-MCNC: NEGATIVE MG/DL — SIGNIFICANT CHANGE UP
RBC # BLD: 4.58 M/UL — SIGNIFICANT CHANGE UP (ref 3.8–5.2)
RBC # BLD: 4.58 M/UL — SIGNIFICANT CHANGE UP (ref 3.8–5.2)
RBC # FLD: 13.4 % — SIGNIFICANT CHANGE UP (ref 10.3–14.5)
RBC # FLD: 13.4 % — SIGNIFICANT CHANGE UP (ref 10.3–14.5)
SODIUM SERPL-SCNC: 137 MMOL/L — SIGNIFICANT CHANGE UP (ref 135–145)
SODIUM SERPL-SCNC: 137 MMOL/L — SIGNIFICANT CHANGE UP (ref 135–145)
SP GR SPEC: 1.02 — SIGNIFICANT CHANGE UP (ref 1–1.03)
SP GR SPEC: 1.02 — SIGNIFICANT CHANGE UP (ref 1–1.03)
UROBILINOGEN FLD QL: 1 MG/DL — SIGNIFICANT CHANGE UP (ref 0.2–1)
UROBILINOGEN FLD QL: 1 MG/DL — SIGNIFICANT CHANGE UP (ref 0.2–1)
WBC # BLD: 8.1 K/UL — SIGNIFICANT CHANGE UP (ref 3.8–10.5)
WBC # BLD: 8.1 K/UL — SIGNIFICANT CHANGE UP (ref 3.8–10.5)
WBC # FLD AUTO: 8.1 K/UL — SIGNIFICANT CHANGE UP (ref 3.8–10.5)
WBC # FLD AUTO: 8.1 K/UL — SIGNIFICANT CHANGE UP (ref 3.8–10.5)

## 2023-10-24 PROCEDURE — 80053 COMPREHEN METABOLIC PANEL: CPT

## 2023-10-24 PROCEDURE — 83690 ASSAY OF LIPASE: CPT

## 2023-10-24 PROCEDURE — 81003 URINALYSIS AUTO W/O SCOPE: CPT

## 2023-10-24 PROCEDURE — 99284 EMERGENCY DEPT VISIT MOD MDM: CPT | Mod: 25

## 2023-10-24 PROCEDURE — 83735 ASSAY OF MAGNESIUM: CPT

## 2023-10-24 PROCEDURE — 74177 CT ABD & PELVIS W/CONTRAST: CPT | Mod: 26,MA

## 2023-10-24 PROCEDURE — 99285 EMERGENCY DEPT VISIT HI MDM: CPT

## 2023-10-24 PROCEDURE — 85025 COMPLETE CBC W/AUTO DIFF WBC: CPT

## 2023-10-24 PROCEDURE — 36415 COLL VENOUS BLD VENIPUNCTURE: CPT

## 2023-10-24 PROCEDURE — 74177 CT ABD & PELVIS W/CONTRAST: CPT | Mod: MA

## 2023-10-24 PROCEDURE — 87086 URINE CULTURE/COLONY COUNT: CPT

## 2023-10-24 PROCEDURE — 96360 HYDRATION IV INFUSION INIT: CPT | Mod: XU

## 2023-10-24 RX ORDER — SODIUM CHLORIDE 9 MG/ML
1000 INJECTION INTRAMUSCULAR; INTRAVENOUS; SUBCUTANEOUS ONCE
Refills: 0 | Status: COMPLETED | OUTPATIENT
Start: 2023-10-24 | End: 2023-10-24

## 2023-10-24 RX ADMIN — SODIUM CHLORIDE 1000 MILLILITER(S): 9 INJECTION INTRAMUSCULAR; INTRAVENOUS; SUBCUTANEOUS at 15:06

## 2023-10-24 RX ADMIN — Medication 1 TABLET(S): at 15:30

## 2023-10-24 RX ADMIN — SODIUM CHLORIDE 1000 MILLILITER(S): 9 INJECTION INTRAMUSCULAR; INTRAVENOUS; SUBCUTANEOUS at 13:41

## 2023-10-24 NOTE — ED PROVIDER NOTE - NS_ ATTENDINGSCRIBEDETAILS _ED_A_ED_FT
Attending Starr: The scribe's documentation has been prepared under my direction and personally reviewed by me in its entirety. I confirm that the note above accurately reflects all work, treatment, procedures, and medical decision making performed by me.

## 2023-10-24 NOTE — ED PROVIDER NOTE - PROGRESS NOTE DETAILS
Attending Camilao: CT w/ acute uncomplicated divertic. informed pt. abx given and sent to pharmacy. return precautions provided, ready for DC.

## 2023-10-24 NOTE — ED PROVIDER NOTE - PHYSICAL EXAMINATION
Gen: NAD, AOx3, able to make needs known, non-toxic  Head: NCAT  HEENT: EOMI, oral mucosa moist, normal conjunctiva  Lung: no respiratory distress, CTAB, no wheezes/rhonchi/rales B/L, speaking in full sentences  CV: RRR, no murmurs  Abd: non distended, soft, no guarding, no CVA tenderness. Lower abd tenderness, worse in LLQ  MSK: no visible deformities  Neuro: Appears non focal  Skin: Warm, well perfused  Psych: normal affect

## 2023-10-24 NOTE — ED PROVIDER NOTE - CLINICAL SUMMARY MEDICAL DECISION MAKING FREE TEXT BOX
59 y/o female with presents with lower abd pain. Pt overall well appearing and NAD. Abd with LLQ tenderness. Will CT abd pelvis to evaluate for diverticulitis vs other acute intraabdominal pathology. Plan: labs, check UA, CT. Pt declines pain meds at this time. Will reassess. 57 y/o female with presents with lower abd pain. Pt overall well appearing and NAD. Abd with LLQ tenderness. Will CT abd pelvis to evaluate for diverticulitis vs other acute intraabdominal pathology. Doubtful UTI given no urinary symptoms. Plan: labs, check UA, meds PRN, CT a/p. Pt declines pain meds at this time. Will reassess the need for additional interventions as clinically warranted. Refer to any progress notes for updates on clinical course and as a continuation of this MDM.

## 2023-10-24 NOTE — ED PROVIDER NOTE - NSFOLLOWUPINSTRUCTIONS_ED_ALL_ED_FT
1) Follow up with your doctor this week  2) Return to the ED immediately for new or worsening symptoms   3) Please continue to take any home medications as prescribed  4) Your test results from your ED visit were discussed with you prior to discharge  5) You were provided with a copy of your test results  6) Please  your antibiotics at the pharmacy and take as directed  7) Please take Motrin 600 mg by mouth every 6 hours as needed for pain. Please take this medication with food.  8) Please take Tylenol 1,000 mg every 6 hours as needed for pain. Please do not exceed more than 4,000mg of Tylenol in a day     Diverticulitis    Diverticulitis is inflammation or infection of small pouches in your colon that form when you HAVE a condition called diverticulosis. This condition can range from mild to severe potentially leading to perforation or obstructions of your colon. Symptoms include abdominal pain, fever/chills, nausea, vomiting, diarrhea, constipation, or blood in your stool. If you were prescribed an antibiotic medicine, take it as told by your health care provider. Do not stop taking the antibiotic even if you start to feel better.    SEEK IMMEDIATE MEDICAL CARE IF YOU HAVE ANY OF THE FOLLOWING SYMPTOMS: worsening abdominal pain, high fever, inability to hold down liquids or medication, black or bloody stools, inability to pass gas, lightheadedness/dizziness, or a change in mental status.

## 2023-10-24 NOTE — ED PROVIDER NOTE - OBJECTIVE STATEMENT
57 y/o female with a PMHx of breast cancer, diverticulitis, endometriosis, ovarian cyst, phlebitis presents to the ED c/o lower abd pain x2 weeks. Denies fevers, diarrhea. Pt took Tylenol at home, last dose PTA. No other complaints at this time. 59 y/o female with a PMHx of breast cancer, diverticulitis, endometriosis, ovarian cyst, phlebitis presents to the ED c/o lower abd pain x2 weeks. Was on vacation when symptoms started, had diarrhea then, but then has since resolved. Pain described as burning sensation across lower abdomen, worse in LLQ. Having some nausea, but no vomiting. Pt took Tylenol at home, last dose PTA. No other complaints at this time. Denies fevers, chills, headache, dizziness, blurred vision, chest pain, cough, shortness of breath, constipation, urinary symptoms, MSK pain, rash.

## 2023-10-24 NOTE — ED PROVIDER NOTE - PATIENT PORTAL LINK FT
You can access the FollowMyHealth Patient Portal offered by [facility] by registering at the following website: [email or web address]. By joining [facility]’s FollowMyHealth portal, you will also be able to view your health information using other applications (apps) compatible with our system.

## 2023-10-24 NOTE — ED ADULT TRIAGE NOTE - CHIEF COMPLAINT QUOTE
Pt presents to ED c/o lower abdominal pain and back pain x2wks. Pt describes abdominal pain as burning sensation that worsens when lying down. Endorses nausea. Denies vomiting, diarrhea, dark/bloody stools, burning with urination

## 2023-12-12 ENCOUNTER — APPOINTMENT (OUTPATIENT)
Dept: BREAST CENTER | Facility: CLINIC | Age: 58
End: 2023-12-12
Payer: COMMERCIAL

## 2023-12-12 VITALS
WEIGHT: 205 LBS | BODY MASS INDEX: 30.36 KG/M2 | SYSTOLIC BLOOD PRESSURE: 129 MMHG | HEIGHT: 69 IN | DIASTOLIC BLOOD PRESSURE: 75 MMHG | HEART RATE: 81 BPM

## 2023-12-12 DIAGNOSIS — M79.622 PAIN IN LEFT UPPER ARM: ICD-10-CM

## 2023-12-12 PROCEDURE — 99214 OFFICE O/P EST MOD 30 MIN: CPT

## 2023-12-13 NOTE — ED STATDOCS - NSFOLLOWUPINSTRUCTIONS_ED_ALL_ED_FT
[place]
Chest Pain    WHAT YOU NEED TO KNOW:    Chest pain can be caused by a range of conditions, from not serious to life-threatening. Chest pain can be a symptom of a digestive problem, such as acid reflux or a stomach ulcer. An anxiety attack or a strong emotion, such as anger, can also cause chest pain. Infection, inflammation, or a fracture in the bones or cartilage in your chest can cause pain or discomfort. Sometimes chest pain or pressure is caused by poor blood flow to your heart (angina). Chest pain may also be caused by life-threatening conditions such as a heart attack or blood clot in your lungs.     DISCHARGE INSTRUCTIONS:    Call 911 if:     You have any of the following signs of a heart attack:   Squeezing, pressure, or pain in your chest       and any of the following:   Discomfort or pain in your back, neck, jaw, stomach, or arm       Shortness of breath      Nausea or vomiting      Lightheadedness or a sudden cold sweat        Return to the emergency department if:     You have chest discomfort that gets worse, even with medicine.      You cough or vomit blood.       Your bowel movements are black or bloody.       You cannot stop vomiting, or it hurts to swallow.     Contact your healthcare provider if:     You have questions or concerns about your condition or care.        Medicines:     Medicines may be given to treat the cause of your chest pain. Examples include pain medicine, anxiety medicine, or medicines to increase blood flow to your heart.       Do not take certain medicines without asking your healthcare provider first. These include NSAIDs, herbal or vitamin supplements, or hormones (estrogen or progestin).       Take your medicine as directed. Contact your healthcare provider if you think your medicine is not helping or if you have side effects. Tell him or her if you are allergic to any medicine. Keep a list of the medicines, vitamins, and herbs you take. Include the amounts, and when and why you take them. Bring the list or the pill bottles to follow-up visits. Carry your medicine list with you in case of an emergency.    Follow up with your healthcare provider within 72 hours, or as directed: You may need to return for more tests to find the cause of your chest pain. You may be referred to a specialist, such as a cardiologist or gastroenterologist. Write down your questions so you remember to ask them during your visits.     Healthy living tips: The following are general healthy guidelines. If your chest pain is caused by a heart problem, your healthcare provider will give you specific guidelines to follow.    Do not smoke. Nicotine and other chemicals in cigarettes and cigars can cause lung and heart damage. Ask your healthcare provider for information if you currently smoke and need help to quit. E-cigarettes or smokeless tobacco still contain nicotine. Talk to your healthcare provider before you use these products.       Eat a variety of healthy, low-fat, low-salt foods. Healthy foods include fruits, vegetables, whole-grain breads, low-fat dairy products, beans, lean meats, and fish. Ask for more information about a heart healthy diet.      Drink plenty of water every day. Your body is made of mostly water. Water helps your body to control your temperature and blood pressure. Ask your healthcare provider how much water you should drink every day.      Ask about activity. Your healthcare provider will tell you which activities to limit or avoid. Ask when you can drive, return to work, and have sex. Ask about the best exercise plan for you.      Maintain a healthy weight. Ask your healthcare provider how much you should weigh. Ask him or her to help you create a weight loss plan if you are overweight.       Get the flu and pneumonia vaccines. All adults should get the influenza (flu) vaccine. Get it every year as soon as it becomes available. The pneumococcal vaccine is given to adults aged 65 years or older. The vaccine is given every 5 years to prevent pneumococcal disease, such as pneumonia.    If you have a stent:     Carry your stent card with you at all times.       Let all healthcare providers know that you have a stent.

## 2023-12-20 ENCOUNTER — APPOINTMENT (OUTPATIENT)
Dept: GASTROENTEROLOGY | Facility: CLINIC | Age: 58
End: 2023-12-20

## 2024-01-10 NOTE — PRE-ANESTHESIA EVALUATION ADULT - HEART RATE (BEATS/MIN)
"Anesthesia Transfer of Care Note    Patient: Tori Bojorquez    Procedure(s) Performed: Procedure(s) (LRB):  CERCLAGE, CERVIX (N/A)    Patient location: PACU    Anesthesia Type: general    Transport from OR: Transported from OR on room air with adequate spontaneous ventilation    Post pain: adequate analgesia    Post assessment: no apparent anesthetic complications    Post vital signs: stable    Level of consciousness: responds to stimulation    Nausea/Vomiting: no nausea/vomiting    Complications: none    Transfer of care protocol was followed      Last vitals: Visit Vitals  /65   Pulse 77   Temp 36.5 °C (97.7 °F) (Oral)   Resp 18   Ht 5' 4" (1.626 m)   Wt 94.4 kg (208 lb 1.8 oz)   LMP 10/16/2023 (Approximate)   SpO2 97%   Breastfeeding No   BMI 35.72 kg/m²     "
74

## 2024-02-06 ENCOUNTER — OUTPATIENT (OUTPATIENT)
Dept: OUTPATIENT SERVICES | Facility: HOSPITAL | Age: 59
LOS: 1 days | Discharge: HOME | End: 2024-02-06

## 2024-02-06 DIAGNOSIS — Z45.2 ENCOUNTER FOR ADJUSTMENT AND MANAGEMENT OF VASCULAR ACCESS DEVICE: Chronic | ICD-10-CM

## 2024-02-06 DIAGNOSIS — Z90.11 ACQUIRED ABSENCE OF RIGHT BREAST AND NIPPLE: Chronic | ICD-10-CM

## 2024-02-06 DIAGNOSIS — C50.512 MALIGNANT NEOPLASM OF LOWER-OUTER QUADRANT OF LEFT FEMALE BREAST: ICD-10-CM

## 2024-02-06 DIAGNOSIS — Z98.82 BREAST IMPLANT STATUS: Chronic | ICD-10-CM

## 2024-02-06 DIAGNOSIS — Z98.890 OTHER SPECIFIED POSTPROCEDURAL STATES: Chronic | ICD-10-CM

## 2024-03-04 ENCOUNTER — APPOINTMENT (OUTPATIENT)
Dept: OBGYN | Facility: CLINIC | Age: 59
End: 2024-03-04

## 2024-03-04 ENCOUNTER — APPOINTMENT (OUTPATIENT)
Dept: HEMATOLOGY ONCOLOGY | Facility: CLINIC | Age: 59
End: 2024-03-04
Payer: COMMERCIAL

## 2024-03-04 DIAGNOSIS — C50.512 MALIGNANT NEOPLASM OF LOWER-OUTER QUADRANT OF LEFT FEMALE BREAST: ICD-10-CM

## 2024-03-04 DIAGNOSIS — Z17.0 MALIGNANT NEOPLASM OF LOWER-OUTER QUADRANT OF LEFT FEMALE BREAST: ICD-10-CM

## 2024-03-04 PROCEDURE — 99215 OFFICE O/P EST HI 40 MIN: CPT

## 2024-03-04 NOTE — PHYSICAL EXAM
[Fully active, able to carry on all pre-disease performance without restriction] : Status 0 - Fully active, able to carry on all pre-disease performance without restriction [Normal] : normal appearance, no rash, nodules, vesicles, ulcers, erythema [de-identified] : bilateral mastectomy ; bilateral DMITRI reconstruction, scars well healed.No nipple reconstruction; left breast slightly larger than the right [de-identified] : transverse abdominal incision healed; right sided wound opening with minimal serosanguineous secretions; left sided drain catheter in place, draining small amount of serosanguineous fluid

## 2024-03-04 NOTE — HISTORY OF PRESENT ILLNESS
[Disease: _____________________] : Disease: [unfilled] [T: ___] : T[unfilled] [N: ___] : N[unfilled] [AJCC Stage: ____] : AJCC Stage: [unfilled] [de-identified] : 58F with history right breast cancer at age 37, s/p right mastectomy with saline implant reconstruction, and nipple reconstruction with left nipple graft, s/p adjuvant chemotherapy x 6 months (Dr. Rene White), referred for medical oncologic consultation after diagnosis of left breast carcinoma in September 2019.   CASE SYNOPSIS:  5/29/03- segmental resection right breast for moderately differentiated infiltrating ductal carcinoma; margin involved by infiltrating tumor.  7/10/03- right total mastectomy with saline implant reconstruction( Jak Townsend/ Benny Grover). Received adjuvant systemic chemotherapy ( records to be obtained), under the care of Dr. Don White.  10/2018- reports lower left breast itching.  10/31/18- mammogram/ breast US: no suspicious lesions, no evidence of malignancy, intramammary lymph node 2:00 left breast.  6/24/19 MRI breast - right implant intact; left breast 5:00 N8, 2 adjacent masses 1, and 1.5 cm; abnormal clumped enhancement extending from masses medially, multiple additional masses throughout left breast: 6:00 N4 8 mm, 3:00 N6 1 cm, 2:00 N6 7 mm, 12:00 N6 9 mm, 10:00 N4 8 mm. Multiple small axillary lymph nodes.  6/25/19- DEXA scan: low bone mass based on left femoral neck T score (-1.2 SD from the mean).  7/9/19: left breast US and US-guided core biopsy; left axilla with sonographically normal lymph node.  Left 4-5:00 N6 9 x 7 x 10 mm and irregular inferior mass 18 x 9 x 14 mm. Pathology:  Left 4-5:00 superior- infiltrating ductal carcinoma, SBR 6/9, ER90%, MT 90%, Her 2 negative.  Left 4-5:00 inferior - infiltrating ductal carcinoma, SBR 6/9, ER90%, MT 70%, Her 2 negative.  9/12/19: left mastectomy with sentinel node biopsy and removal of both implants with bilateral DMITRI flap reconstruction.  Pathology: left breast with 2 foci of invasive ductal carcinoma 2.5 cm and 1.2 cm , SBR 6/9, with foci of DCIS in UOQ, + LVI and PNI, margins negative, 1 / 2 SLN positive ( 8 mm ; no extracapsular extension), 2 more negative non- sentinel LN,  right and left internal mammary nodes negative.(pT2, pN1, Mx = stage IIB)  10/1/19- develops venous thrombosis in the basilic vein, superficial, extended for at least 10 cm.  LYMPHA procedure postponed. Started on anticoagulation.  10/2/19: PET/CT consistent with FDG avidity associated with the reconstructed bilateral breast in the anterior abdominal wall; no FDG avid local regional or distant metastatic disease.  10/17/19 - chemo- education session.  11/5/19- 2/18/20- completes 6 cycles Docetaxel/ Cyclophosphamide.  4/6- 5/8/20-  XRT left chest wall.  5/8/20- starts anastrozole.  7/10/2020: Bone scan-no radionuclide evidence of osseous metastases.   7/22/2020: CTA-negative for pulmonary embolism; left upper lobe peripheral groundglass opacity, possibly infectious or inflammatory.  7/21/21- right and left breast revision reconstruction, reconstruction of right inframammary fold with external Ru skin advancement flap; removal of right chest Mediport, excision of the left lower abdominal wall donor site subcutaneous mass with wound closure and revision of left hip donor site scar deformity.  Pathology consistent with fat necrosis and unremarkable skin ( Dr. Rodriguez).  6/29/22- FNA left breast 9:00: negative for malignancy.  4/4/23 bone density: Osteopenia  6/25/2023 CT A/P: focal wall thickening with mild stranding in the adjacent peritoneal fat noted in the mid sigmoid colon, likely focal diverticulitis versus focal colitis. No loculated collections to suggest an abscess. No evidence of perforation.  [de-identified] : infiltrating ductal carcinoma [de-identified] : ER 90%, AK 90%, Her 2 negative [FreeTextEntry1] : docetaxel/ cyclophosphamide\par  Anastrozole-started 5/8/2020 [de-identified] : Last seen in the office in August 2023.Continues treatment with anastrozole since May 2020; complains of minimal arthralgia, not affecting daily living activities. Physical examination unchanged.  Still complaining of non-productive cough, heartburn and occasional left breast pain in inferior quadrant, where she palpates an area of firm reconstructed breast tissue.  No new images available for review.  Last DEXA/bone density performed a year ago showed osteopenia. Hematologic profile stable; vitamin D in normal range.

## 2024-04-01 ENCOUNTER — APPOINTMENT (OUTPATIENT)
Dept: MRI IMAGING | Facility: CLINIC | Age: 59
End: 2024-04-01
Payer: COMMERCIAL

## 2024-04-01 ENCOUNTER — OUTPATIENT (OUTPATIENT)
Dept: OUTPATIENT SERVICES | Facility: HOSPITAL | Age: 59
LOS: 1 days | End: 2024-04-01
Payer: COMMERCIAL

## 2024-04-01 DIAGNOSIS — Z98.890 OTHER SPECIFIED POSTPROCEDURAL STATES: Chronic | ICD-10-CM

## 2024-04-01 DIAGNOSIS — C50.512 MALIGNANT NEOPLASM OF LOWER-OUTER QUADRANT OF LEFT FEMALE BREAST: ICD-10-CM

## 2024-04-01 DIAGNOSIS — Z90.11 ACQUIRED ABSENCE OF RIGHT BREAST AND NIPPLE: Chronic | ICD-10-CM

## 2024-04-01 DIAGNOSIS — Z98.82 BREAST IMPLANT STATUS: Chronic | ICD-10-CM

## 2024-04-01 DIAGNOSIS — Z45.2 ENCOUNTER FOR ADJUSTMENT AND MANAGEMENT OF VASCULAR ACCESS DEVICE: Chronic | ICD-10-CM

## 2024-04-01 PROCEDURE — 77049 MRI BREAST C-+ W/CAD BI: CPT | Mod: 26

## 2024-04-01 PROCEDURE — A9585: CPT

## 2024-04-01 PROCEDURE — C8937: CPT

## 2024-04-01 PROCEDURE — C8908: CPT

## 2024-04-22 ENCOUNTER — APPOINTMENT (OUTPATIENT)
Dept: OTOLARYNGOLOGY | Facility: CLINIC | Age: 59
End: 2024-04-22
Payer: COMMERCIAL

## 2024-04-22 ENCOUNTER — NON-APPOINTMENT (OUTPATIENT)
Age: 59
End: 2024-04-22

## 2024-04-22 VITALS
WEIGHT: 206.5 LBS | HEART RATE: 80 BPM | SYSTOLIC BLOOD PRESSURE: 162 MMHG | DIASTOLIC BLOOD PRESSURE: 87 MMHG | BODY MASS INDEX: 30.59 KG/M2 | HEIGHT: 69 IN

## 2024-04-22 DIAGNOSIS — R05.3 CHRONIC COUGH: ICD-10-CM

## 2024-04-22 PROCEDURE — 31575 DIAGNOSTIC LARYNGOSCOPY: CPT

## 2024-04-22 PROCEDURE — 99204 OFFICE O/P NEW MOD 45 MIN: CPT | Mod: 25

## 2024-04-22 RX ORDER — BECLOMETHASONE DIPROPIONATE HFA 80 UG/1
80 AEROSOL, METERED RESPIRATORY (INHALATION) TWICE DAILY
Qty: 1 | Refills: 5 | Status: ACTIVE | COMMUNITY
Start: 2024-04-22 | End: 1900-01-01

## 2024-04-22 RX ORDER — PANTOPRAZOLE 40 MG/1
40 TABLET, DELAYED RELEASE ORAL DAILY
Qty: 30 | Refills: 5 | Status: ACTIVE | COMMUNITY
Start: 2024-04-22 | End: 2024-10-19

## 2024-04-22 RX ORDER — POLYETHYLENE GLYCOL-3350 AND ELECTROLYTES 236; 6.74; 5.86; 2.97; 22.74 G/274.31G; G/274.31G; G/274.31G; G/274.31G; G/274.31G
236 POWDER, FOR SOLUTION ORAL
Qty: 1 | Refills: 0 | Status: COMPLETED | COMMUNITY
Start: 2022-07-14 | End: 2024-04-22

## 2024-04-22 NOTE — HISTORY OF PRESENT ILLNESS
[de-identified] : NUHA RODRIGUEZ is a 58 year old female who is coming to the [facility] for evaluation of a chronic cough x 3 years. She is referred by Dr. Elvia Lindo (onc). PMH significant for right breast cancer at age 37 s/p right mastectomy and adjuvant chemotherapy x 6 months. Had new left breast carcinoma in September 2019. They note a cough for the past 3 years. Specific triggers for the cough include: anything They do note severe paroxysms of cough. They do note coughing as they lie down for bed. They do note being awakened from sleep by this cough frequently. They do note specific difficulties with swallowing - globus sensation gives her the feeling of not being able to swallow They do not note changes in their voice. They do not note problems with breathing. They do note frequent classic heartburn symptoms - pantoprazole daily, ran out 2 months ago They are not currently smoking.  They do not note an exposure to someone with Tuberculosis. They do not note unintentional weight loss in the past 3 months. They do not report chills/night sweats in the last month. They have tried the following meds in an attempt to treat the cough: allergy medications They are not maintained on an ACE inhibitor.   PREVIOUS STUDIES: CXR 1/26/23: No radiographic evidence of active chest disease. MRI [facility]reat 4/1/24: No MRI evidence of malignancy.

## 2024-04-22 NOTE — ASSESSMENT
[FreeTextEntry1] : Assessment/Plan: #1 Idiopathic chronic cough  After a thorough discussion regarding our findings today the patient understands that they have the presumptive diagnosis of idiopathic chronic cough.  They have agreed to start my Chronic Cough Protocol. They are to take 30 mg of prednisone daily for a total of two weeks. The day of their last dose they are to start using an inhaled steroid two puffs twice daily until I discontinue it. Over half of patients with chronic cough will have a complete cessation or near complete cessation of their cough doing this protocol. If they have resolution or near resolution of cough, I will continue them on the inhaled steroid for three more months. At that time they may attempt to slowly taper their dose. Most patients are able to get completely off their inhaled steroid, but if the cough starts to return they are to stay on the maximum dose of 2 puffs twice a day. If they are using QVAR they do not need a spacer, but if using a different inhaled steroid I strongly suggest using a spacer. Regardless, they should rinse their mouth out after each use. I have discussed the risks, benefits, and alternatives to care in regards to the oral steroids. If their insurance does not cover the steroid inhaler I prescribed the following are all acceptable substitutes:  Qvar redihaler (Beclomethasone) 80 mcg 2 puffs BID Flovent HFA (Fluticasone propionate) 220 mcg 2 puffs BID Alvesco (Ciclesonide) 160 mcg 2 puffs BID Pulmicort (Budesomide) 180 mcg 2 puffs BID Asthmanex HFA (Mometasone) 200 mcg 2 puffs BID  Should they fail the Chronic Cough Protocol and still have a cough after 2 weeks of prednisone they are to call or message my office to update me.  At that point we would order them for a CT chest w/o contrast, start them on gabapentin, and schedule a follow up with me.    The patient expressed understanding with this plan and received a handout explaining it in detail.  The risks, benefits, and alternatives were discussed.  Patient will reach out with any questions.

## 2024-04-22 NOTE — CONSULT LETTER
[Consult Letter:] : I had the pleasure of evaluating your patient, [unfilled]. [Dear  ___] : Dear  [unfilled], [Please see my note below.] : Please see my note below. [Consult Closing:] : Thank you very much for allowing me to participate in the care of this patient.  If you have any questions, please do not hesitate to contact me. [Sincerely,] : Sincerely, [FreeTextEntry2] : Dr. Elvia Lindo  [FreeTextEntry3] : Reid Alvarenga M.D. Division of Laryngology | Department of Otolaryngology  [address] [address]

## 2024-04-22 NOTE — PROCEDURE
[de-identified] : Procedure: Transnasal flexible laryngoscopy  Description: Informed consent was obtained from the patient prior to the procedure. The patient was seated in the clinic chair. Topical anesthesia was achieved by first spraying the nasal cavities with 4% lidocaine and 1% phenylephrine.   Exam: This demonstrates a clear vallecula and crisp epiglottis. The aryepiglottic folds are intact and symmetric bilaterally. The hypopharynx does not demonstrate pooling. The interarytenoid space demonstrates no lesions. It does not demonstrate pachydermia. The false vocal folds are symmetric and without lesions or masses. False fold voicing (plica ventricularis) is not noted today. The true vocal folds show normal and symmetric motion bilaterally. There is no paradoxical motion. The right medial edge is crisp and shows no lesions or masses. The left medial edge is crisp and shows no lesions or masses. The mucosal covering is minimally edematous. There is not erythema present today. Large right vascular ectasia. The vocal processes do not demonstrate granulomas. The subglottis and proximal trachea is clear and unobstructed to the limits of the examination today.

## 2024-04-23 ENCOUNTER — EMERGENCY (EMERGENCY)
Facility: HOSPITAL | Age: 59
LOS: 0 days | Discharge: HOME | End: 2024-04-24
Attending: EMERGENCY MEDICINE
Payer: COMMERCIAL

## 2024-04-23 VITALS
DIASTOLIC BLOOD PRESSURE: 56 MMHG | TEMPERATURE: 99 F | SYSTOLIC BLOOD PRESSURE: 101 MMHG | HEART RATE: 89 BPM | RESPIRATION RATE: 18 BRPM | OXYGEN SATURATION: 95 %

## 2024-04-23 VITALS — HEIGHT: 69 IN

## 2024-04-23 DIAGNOSIS — I25.10 ATHEROSCLEROTIC HEART DISEASE OF NATIVE CORONARY ARTERY WITHOUT ANGINA PECTORIS: ICD-10-CM

## 2024-04-23 DIAGNOSIS — Z45.2 ENCOUNTER FOR ADJUSTMENT AND MANAGEMENT OF VASCULAR ACCESS DEVICE: Chronic | ICD-10-CM

## 2024-04-23 DIAGNOSIS — N28.9 DISORDER OF KIDNEY AND URETER, UNSPECIFIED: ICD-10-CM

## 2024-04-23 DIAGNOSIS — K44.9 DIAPHRAGMATIC HERNIA WITHOUT OBSTRUCTION OR GANGRENE: ICD-10-CM

## 2024-04-23 DIAGNOSIS — R68.83 CHILLS (WITHOUT FEVER): ICD-10-CM

## 2024-04-23 DIAGNOSIS — K76.9 LIVER DISEASE, UNSPECIFIED: ICD-10-CM

## 2024-04-23 DIAGNOSIS — Z90.11 ACQUIRED ABSENCE OF RIGHT BREAST AND NIPPLE: Chronic | ICD-10-CM

## 2024-04-23 DIAGNOSIS — Z85.3 PERSONAL HISTORY OF MALIGNANT NEOPLASM OF BREAST: ICD-10-CM

## 2024-04-23 DIAGNOSIS — R11.0 NAUSEA: ICD-10-CM

## 2024-04-23 DIAGNOSIS — Z98.890 OTHER SPECIFIED POSTPROCEDURAL STATES: Chronic | ICD-10-CM

## 2024-04-23 DIAGNOSIS — R10.30 LOWER ABDOMINAL PAIN, UNSPECIFIED: ICD-10-CM

## 2024-04-23 DIAGNOSIS — Z98.82 BREAST IMPLANT STATUS: Chronic | ICD-10-CM

## 2024-04-23 DIAGNOSIS — K57.32 DIVERTICULITIS OF LARGE INTESTINE WITHOUT PERFORATION OR ABSCESS WITHOUT BLEEDING: ICD-10-CM

## 2024-04-23 LAB
ALBUMIN SERPL ELPH-MCNC: 3.6 G/DL — SIGNIFICANT CHANGE UP (ref 3.3–5)
ALP SERPL-CCNC: 60 U/L — SIGNIFICANT CHANGE UP (ref 40–120)
ALT FLD-CCNC: 34 U/L — SIGNIFICANT CHANGE UP (ref 12–78)
ANION GAP SERPL CALC-SCNC: 6 MMOL/L — SIGNIFICANT CHANGE UP (ref 5–17)
APPEARANCE UR: CLEAR — SIGNIFICANT CHANGE UP
AST SERPL-CCNC: 26 U/L — SIGNIFICANT CHANGE UP (ref 15–37)
BACTERIA # UR AUTO: NEGATIVE /HPF — SIGNIFICANT CHANGE UP
BASOPHILS # BLD AUTO: 0.08 K/UL — SIGNIFICANT CHANGE UP (ref 0–0.2)
BASOPHILS NFR BLD AUTO: 0.5 % — SIGNIFICANT CHANGE UP (ref 0–2)
BILIRUB SERPL-MCNC: 0.7 MG/DL — SIGNIFICANT CHANGE UP (ref 0.2–1.2)
BILIRUB UR-MCNC: ABNORMAL
BUN SERPL-MCNC: 13 MG/DL — SIGNIFICANT CHANGE UP (ref 7–23)
CALCIUM SERPL-MCNC: 9.6 MG/DL — SIGNIFICANT CHANGE UP (ref 8.5–10.1)
CAST: 2 /LPF — SIGNIFICANT CHANGE UP (ref 0–4)
CHLORIDE SERPL-SCNC: 106 MMOL/L — SIGNIFICANT CHANGE UP (ref 96–108)
CO2 SERPL-SCNC: 28 MMOL/L — SIGNIFICANT CHANGE UP (ref 22–31)
COLOR SPEC: SIGNIFICANT CHANGE UP
COMMENT - URINE: SIGNIFICANT CHANGE UP
CREAT SERPL-MCNC: 0.88 MG/DL — SIGNIFICANT CHANGE UP (ref 0.5–1.3)
DIFF PNL FLD: ABNORMAL
EGFR: 76 ML/MIN/1.73M2 — SIGNIFICANT CHANGE UP
EOSINOPHIL # BLD AUTO: 0.12 K/UL — SIGNIFICANT CHANGE UP (ref 0–0.5)
EOSINOPHIL NFR BLD AUTO: 0.7 % — SIGNIFICANT CHANGE UP (ref 0–6)
GLUCOSE SERPL-MCNC: 112 MG/DL — HIGH (ref 70–99)
GLUCOSE UR QL: NEGATIVE MG/DL — SIGNIFICANT CHANGE UP
HCT VFR BLD CALC: 40.9 % — SIGNIFICANT CHANGE UP (ref 34.5–45)
HGB BLD-MCNC: 13.2 G/DL — SIGNIFICANT CHANGE UP (ref 11.5–15.5)
IMM GRANULOCYTES NFR BLD AUTO: 0.5 % — SIGNIFICANT CHANGE UP (ref 0–0.9)
KETONES UR-MCNC: 15 MG/DL
LEUKOCYTE ESTERASE UR-ACNC: ABNORMAL
LIDOCAIN IGE QN: 22 U/L — SIGNIFICANT CHANGE UP (ref 13–75)
LYMPHOCYTES # BLD AUTO: 1.95 K/UL — SIGNIFICANT CHANGE UP (ref 1–3.3)
LYMPHOCYTES # BLD AUTO: 11.1 % — LOW (ref 13–44)
MCHC RBC-ENTMCNC: 29.1 PG — SIGNIFICANT CHANGE UP (ref 27–34)
MCHC RBC-ENTMCNC: 32.3 GM/DL — SIGNIFICANT CHANGE UP (ref 32–36)
MCV RBC AUTO: 90.1 FL — SIGNIFICANT CHANGE UP (ref 80–100)
MONOCYTES # BLD AUTO: 1.26 K/UL — HIGH (ref 0–0.9)
MONOCYTES NFR BLD AUTO: 7.1 % — SIGNIFICANT CHANGE UP (ref 2–14)
NEUTROPHILS # BLD AUTO: 14.14 K/UL — HIGH (ref 1.8–7.4)
NEUTROPHILS NFR BLD AUTO: 80.1 % — HIGH (ref 43–77)
NITRITE UR-MCNC: NEGATIVE — SIGNIFICANT CHANGE UP
PH UR: 5 — SIGNIFICANT CHANGE UP (ref 5–8)
PLATELET # BLD AUTO: 351 K/UL — SIGNIFICANT CHANGE UP (ref 150–400)
POTASSIUM SERPL-MCNC: 3.6 MMOL/L — SIGNIFICANT CHANGE UP (ref 3.5–5.3)
POTASSIUM SERPL-SCNC: 3.6 MMOL/L — SIGNIFICANT CHANGE UP (ref 3.5–5.3)
PROT SERPL-MCNC: 8 GM/DL — SIGNIFICANT CHANGE UP (ref 6–8.3)
PROT UR-MCNC: 30 MG/DL
RBC # BLD: 4.54 M/UL — SIGNIFICANT CHANGE UP (ref 3.8–5.2)
RBC # FLD: 13.8 % — SIGNIFICANT CHANGE UP (ref 10.3–14.5)
RBC CASTS # UR COMP ASSIST: 7 /HPF — HIGH (ref 0–4)
SODIUM SERPL-SCNC: 140 MMOL/L — SIGNIFICANT CHANGE UP (ref 135–145)
SP GR SPEC: >1.03 — HIGH (ref 1–1.03)
SQUAMOUS # UR AUTO: 4 /HPF — SIGNIFICANT CHANGE UP (ref 0–5)
UROBILINOGEN FLD QL: 1 MG/DL — SIGNIFICANT CHANGE UP (ref 0.2–1)
WBC # BLD: 17.63 K/UL — HIGH (ref 3.8–10.5)
WBC # FLD AUTO: 17.63 K/UL — HIGH (ref 3.8–10.5)
WBC UR QL: 5 /HPF — SIGNIFICANT CHANGE UP (ref 0–5)

## 2024-04-23 PROCEDURE — 74177 CT ABD & PELVIS W/CONTRAST: CPT | Mod: 26,MC

## 2024-04-23 PROCEDURE — 83690 ASSAY OF LIPASE: CPT

## 2024-04-23 PROCEDURE — 80053 COMPREHEN METABOLIC PANEL: CPT

## 2024-04-23 PROCEDURE — 96375 TX/PRO/DX INJ NEW DRUG ADDON: CPT

## 2024-04-23 PROCEDURE — 99284 EMERGENCY DEPT VISIT MOD MDM: CPT | Mod: 25

## 2024-04-23 PROCEDURE — 74177 CT ABD & PELVIS W/CONTRAST: CPT | Mod: MC

## 2024-04-23 PROCEDURE — 36415 COLL VENOUS BLD VENIPUNCTURE: CPT

## 2024-04-23 PROCEDURE — 87086 URINE CULTURE/COLONY COUNT: CPT

## 2024-04-23 PROCEDURE — 96374 THER/PROPH/DIAG INJ IV PUSH: CPT | Mod: XU

## 2024-04-23 PROCEDURE — 81001 URINALYSIS AUTO W/SCOPE: CPT

## 2024-04-23 PROCEDURE — 85025 COMPLETE CBC W/AUTO DIFF WBC: CPT

## 2024-04-23 PROCEDURE — 99285 EMERGENCY DEPT VISIT HI MDM: CPT

## 2024-04-23 RX ORDER — ONDANSETRON 8 MG/1
1 TABLET, FILM COATED ORAL
Qty: 10 | Refills: 0
Start: 2024-04-23

## 2024-04-23 RX ORDER — SODIUM CHLORIDE 9 MG/ML
1000 INJECTION INTRAMUSCULAR; INTRAVENOUS; SUBCUTANEOUS ONCE
Refills: 0 | Status: COMPLETED | OUTPATIENT
Start: 2024-04-23 | End: 2024-04-23

## 2024-04-23 RX ORDER — METRONIDAZOLE 500 MG
500 TABLET ORAL ONCE
Refills: 0 | Status: COMPLETED | OUTPATIENT
Start: 2024-04-23 | End: 2024-04-23

## 2024-04-23 RX ORDER — MORPHINE SULFATE 50 MG/1
4 CAPSULE, EXTENDED RELEASE ORAL ONCE
Refills: 0 | Status: DISCONTINUED | OUTPATIENT
Start: 2024-04-23 | End: 2024-04-23

## 2024-04-23 RX ORDER — CIPROFLOXACIN LACTATE 400MG/40ML
400 VIAL (ML) INTRAVENOUS ONCE
Refills: 0 | Status: COMPLETED | OUTPATIENT
Start: 2024-04-23 | End: 2024-04-23

## 2024-04-23 RX ORDER — ONDANSETRON 8 MG/1
4 TABLET, FILM COATED ORAL ONCE
Refills: 0 | Status: COMPLETED | OUTPATIENT
Start: 2024-04-23 | End: 2024-04-23

## 2024-04-23 RX ORDER — CIPROFLOXACIN LACTATE 400MG/40ML
1 VIAL (ML) INTRAVENOUS
Qty: 20 | Refills: 0
Start: 2024-04-23 | End: 2024-05-02

## 2024-04-23 RX ORDER — ACETAMINOPHEN 500 MG
1000 TABLET ORAL ONCE
Refills: 0 | Status: COMPLETED | OUTPATIENT
Start: 2024-04-23 | End: 2024-04-23

## 2024-04-23 RX ORDER — METRONIDAZOLE 500 MG
1 TABLET ORAL
Qty: 30 | Refills: 0
Start: 2024-04-23 | End: 2024-05-02

## 2024-04-23 RX ADMIN — Medication 100 MILLIGRAM(S): at 23:31

## 2024-04-23 RX ADMIN — ONDANSETRON 4 MILLIGRAM(S): 8 TABLET, FILM COATED ORAL at 21:52

## 2024-04-23 RX ADMIN — SODIUM CHLORIDE 1000 MILLILITER(S): 9 INJECTION INTRAMUSCULAR; INTRAVENOUS; SUBCUTANEOUS at 21:21

## 2024-04-23 RX ADMIN — Medication 400 MILLIGRAM(S): at 21:52

## 2024-04-23 NOTE — ED ADULT NURSE NOTE - NSFALLUNIVINTERV_ED_ALL_ED
Bed/Stretcher in lowest position, wheels locked, appropriate side rails in place/Call bell, personal items and telephone in reach/Instruct patient to call for assistance before getting out of bed/chair/stretcher/Non-slip footwear applied when patient is off stretcher/[place] to call system/Physically safe environment - no spills, clutter or unnecessary equipment/Purposeful proactive rounding/Room/bathroom lighting operational, light cord in reach

## 2024-04-23 NOTE — ED STATDOCS - PROGRESS NOTE DETAILS
PA: Patient is a 58 year old female with PMHx of breast cancer on oral chemo, hx of mastectomy with Maria De Jesus flap (removed from abdomen), who presents to ED c/o LLQ pain x 3 days. DENIES n/v/d, bloody stools. ~Michael Ralph PA-C PA note: +Divertic on CT. All labwork/radiology results discussed in detail with patient. Patient re-examined and re-evaluated. Patient feels much better at this time. ED evaluation, Diagnosis and management discussed with the patient in detail. Workup results discussed with ED attending, OK to dc home. Close GI follow up encouraged, aftercare to assist with scheduling appointment ASAP. Strict ED return instructions discussed in detail and patient given the opportunity to ask any questions about their discharge diagnosis and instructions. Patient verbalized understanding. ~ Michael Ralph PA-C

## 2024-04-23 NOTE — ED STATDOCS - PHYSICAL EXAMINATION
PA NOTE: GEN: AOX3, NAD. HEENT: Throat clear. Airway is patent. EYES: PERRLA. EOMI. Head: NC/AT. NECK: Supple, No JVD. FROM. C-spine non-tender. CV:S1S2, RRR, LUNGS: Non-labored breathing, no tachypnea. O2sat 100% RA. CTA b/l. No w/r/r. CHEST: Equal chest expansion and rise. No deformity. ABD: Soft, +Mild tenderness LLQ area. No rebound, no guarding. No CVAT. EXT: No e/c/c. 2+ distal pulses. SKIN: No rashes. NEURO: No focal deficits. CN II-XII intact. FROM. 5/5 motor and sensory. ~Michael Ralph PA-C

## 2024-04-23 NOTE — ED STATDOCS - NSFOLLOWUPINSTRUCTIONS_ED_ALL_ED_FT
Take tylenol 650mg every 4-6 hours as needed for fever, chills, or pain.  Keep well hydrated.  Take zofran as needed for nausea.   Take Cipro and Flagyl as prescribed.  Follow up with Dr. Mix within 1 week.    Diverticulitis    WHAT YOU NEED TO KNOW:    Diverticulitis is a condition that causes small pockets along your intestine called diverticula to become inflamed or infected. This is caused by hard bowel movements, food, or bacteria that get stuck in the pockets.         DISCHARGE INSTRUCTIONS:    Return to the emergency department if:     You have bowel movement or foul-smelling discharge leaking from your vagina or in your urine.      You have severe diarrhea.      You urinate less than usual or not at all.      You are not able to have a bowel movement.      You cannot stop vomiting.       You have severe abdominal pain, a fever, and your abdomen is larger than usual.       You have new or increased blood in your bowel movements.     Contact your healthcare provider if:     You have pain when you urinate.      Your symptoms get worse or do not go away.       You have questions or concerns about your condition or care.     Medicines:     Antibiotics may be given to help treat a bacterial infection.      Prescription pain medicine may be given. Ask your healthcare provider how to take this medicine safely. Some prescription pain medicines contain acetaminophen. Do not take other medicines that contain acetaminophen without talking to your healthcare provider. Too much acetaminophen may cause liver damage. Prescription pain medicine may cause constipation. Ask your healthcare provider how to prevent or treat constipation.       Take your medicine as directed. Contact your healthcare provider if you think your medicine is not helping or if you have side effects. Tell him or her if you are allergic to any medicine. Keep a list of the medicines, vitamins, and herbs you take. Include the amounts, and when and why you take them. Bring the list or the pill bottles to follow-up visits. Carry your medicine list with you in case of an emergency.    Clear liquid diet: A clear liquid diet includes any liquids that you can see through. Examples include water, ginger-melvin, cranberry or apple juice, frozen fruit ice, or broth. Stay on a clear liquid diet until your symptoms are gone, or as directed.     Follow up with your healthcare provider as directed: You may need to return for a colonoscopy. When your symptoms are gone, you may need a low-fat, high-fiber diet to prevent diverticulitis from developing again. Your healthcare provider or dietitian can help you create meal plans. Write down your questions so you remember to ask them during your visits.
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
## 2024-04-23 NOTE — ED STATDOCS - CARE PROVIDER_API CALL
Sanjeev Mix.  Gastroenterology  [address] [place] [id number]  Phone: [phone]  Fax: [phone]  Follow Up Time:

## 2024-04-23 NOTE — ED STATDOCS - CLINICAL SUMMARY MEDICAL DECISION MAKING FREE TEXT BOX
59 yo F with abdominal pain X 2-3 days, left sided.  Hx of diverticulitis.  Also decreased PO intake and dehydrated.  Labs, urine and CT to r/o colitis, diverticulitis, UTI, pyelonephritis or kidney stone planned.      22:40 Discussed all results with patient.  Patient's gastroenterologist is Dr. Mix.  She has had colonoscopy with him in the past; understands she will need to see him and schedule another one. Will have patient f/u with GI on antibiotics for diverticulitis. 57 yo F with abdominal pain X 2-3 days, left sided.  Hx of diverticulitis.  Also decreased PO intake and dehydrated.  Labs, urine and CT to r/o colitis, diverticulitis, UTI, pyelonephritis or kidney stone planned.      22:40 Discussed all results with patient.  Patient's gastroenterologist is Dr. Mix.  She has had colonoscopy with him in the past; understands she will need to see him and schedule another one. Will have patient f/u with GI on antibiotics for diverticulitis.    PA note: +Divertic on CT. All labwork/radiology results discussed in detail with patient. Patient re-examined and re-evaluated. Patient feels much better at this time. ED evaluation, Diagnosis and management discussed with the patient in detail. Workup results discussed with ED attending, OK to MS home. Close GI follow up encouraged, aftercare to assist with scheduling appointment ASAP. Strict ED return instructions discussed in detail and patient given the opportunity to ask any questions about their discharge diagnosis and instructions. Patient verbalized understanding. ~ Michael Ralph PA-C

## 2024-04-23 NOTE — ED STATDOCS - ATTENDING APP SHARED VISIT CONTRIBUTION OF CARE
Attending Contribution to Care: I, Puja Lr, performed the initial face to face bedside interview with this patient regarding history of present illness, review of symptoms and relevant past medical, social and family history.  I completed an independent physical examination.  I was the initial provider who evaluated this patient. I have signed out the follow up of any pending tests (i.e. labs, radiological studies) to the ACP.  I have communicated the patient’s plan of care and disposition with the ACP.

## 2024-04-23 NOTE — ED STATDOCS - PATIENT PORTAL LINK FT
You can access the FollowMyHealth Patient Portal offered by [facility] by registering at the following website: [email or web address]. By joining [employer]’s FollowMyHealth portal, you will also be able to view your health information using other applications (apps) compatible with our system.

## 2024-04-23 NOTE — ED ADULT NURSE NOTE - OBJECTIVE STATEMENT
pt presents to the ED c/o abdominal pain, vomiting x 1 and irregular bowel movements starting yesterday. pt reports hx of diverticulitis. denies fevers. pt A&Ox4, well appearing, and ambulatory at baseline with no further complaints or discomforts reported at this time.

## 2024-04-23 NOTE — ED ADULT TRIAGE NOTE - CHIEF COMPLAINT QUOTE
Pt presents to the ED c/o left-sided abdominal pain radiating to left flank x2 days. Pt reports vomiting yesterday. Denies fevers or dysuria. PMH of diverticulitis.

## 2024-04-24 RX ORDER — DIPHENHYDRAMINE HCL 50 MG
50 CAPSULE ORAL ONCE
Refills: 0 | Status: COMPLETED | OUTPATIENT
Start: 2024-04-24 | End: 2024-04-24

## 2024-04-24 RX ADMIN — Medication 200 MILLIGRAM(S): at 00:35

## 2024-04-24 RX ADMIN — Medication 50 MILLIGRAM(S): at 01:12

## 2024-04-24 RX ADMIN — Medication 1 TABLET(S): at 01:12

## 2024-04-25 LAB
CULTURE RESULTS: SIGNIFICANT CHANGE UP
SPECIMEN SOURCE: SIGNIFICANT CHANGE UP

## 2024-04-26 ENCOUNTER — APPOINTMENT (OUTPATIENT)
Age: 59
End: 2024-04-26
Payer: COMMERCIAL

## 2024-04-26 VITALS
HEIGHT: 69 IN | DIASTOLIC BLOOD PRESSURE: 82 MMHG | SYSTOLIC BLOOD PRESSURE: 128 MMHG | BODY MASS INDEX: 30.36 KG/M2 | WEIGHT: 205 LBS

## 2024-04-26 DIAGNOSIS — K21.9 GASTRO-ESOPHAGEAL REFLUX DISEASE W/OUT ESOPHAGITIS: ICD-10-CM
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
## 2024-04-26 DIAGNOSIS — L89.90 PRESSURE ULCER OF UNSPECIFIED SITE, UNSPECIFIED STAGE: ICD-10-CM

## 2024-04-26 DIAGNOSIS — K57.32 DIVERTICULITIS OF LARGE INTESTINE W/OUT PERFORATION OR ABSCESS W/OUT BLEEDING: ICD-10-CM

## 2024-04-26 DIAGNOSIS — Z09 ENCOUNTER FOR FOLLOW-UP EXAMINATION AFTER COMPLETED TREATMENT FOR CONDITIONS OTHER THAN MALIGNANT NEOPLASM: ICD-10-CM

## 2024-04-26 PROCEDURE — 99214 OFFICE O/P EST MOD 30 MIN: CPT

## 2024-04-26 RX ORDER — ESOMEPRAZOLE MAGNESIUM 40 MG/1
40 CAPSULE, DELAYED RELEASE ORAL
Qty: 90 | Refills: 1 | Status: ACTIVE | COMMUNITY
Start: 2024-04-26

## 2024-04-26 NOTE — ASSESSMENT
11/17/2023: Called patient after receiving alert from PPM and reviewed it with Tech services.  Transmission shows AP 62%,  <1%. Reviewed what she might feel if that lead failed to capture- ie dizzy, light headed.        A Sensing 1.5mV today (set at 0.75mV) Alerted for below 2:1 safety margin.  Atrial Capture threshold today at 1.75V@0.4ms, (set at 2.25V@0.4ms)  Alerted for < 2:1 safety margin.    Device will audibly beep 4 times 10 hours apart- 1st beep was at 0230 today.    Battery is close to SACHA, estimates <1 month.    Reviewed with patient. Recheck remote ordered for 11/27/2023,  Pam Howell, Device RN           [FreeTextEntry1] : 58-year-old presents for hospital follow up. CT revealed acute uncomplicated distal transverse colonic diverticulitis. Pt also reporting persistent GERD despite pantoprazole therapy.   Plan:  Diverticulitis: Pt to continue regimen of Augmentin and clear liquid diet. As course of abx treatment continues symptoms should resolve. Pt can advance diet as tolerated but to remain low fiber while recovering. Once symptoms have resolved and pt is feeling better can resume high fiber diet to prevent future diverticulitis attacks.  GERD: Will change pantoprazole to esomeprazole daily and add famotidine. With persistent GERD despite PPI therapy recommend EGD for further evaluation. PUD vs Gastritis.  Pt to call office if symptoms worsen. Pt agrees to plan, all questions answered. I spent 35 minutes on this encounter.

## 2024-04-26 NOTE — PHYSICAL EXAM
[Alert] : alert [Normal Voice/Communication] : normal voice/communication [Healthy Appearing] : healthy appearing [Sclera] : the sclera and conjunctiva were normal [Hearing Threshold Finger Rub Not [place]] : hearing was normal [Normal Lips/Gums] : the lips and gums were normal [Normal Appearance] : the appearance of the neck was normal [No Respiratory Distress] : no respiratory distress [Auscultation Breath Sounds / Voice Sounds] : lungs were clear to auscultation bilaterally [Heart Rate And Rhythm] : heart rate was normal and rhythm regular [Normal S1, S2] : normal S1 and S2 [Bowel Sounds] : normal bowel sounds [Abdomen Soft] : soft [Abnormal Walk] : normal gait [Normal Color / Pigmentation] : normal skin color and pigmentation [Oriented To Time, Place, And Person] : oriented to person, place, and time [RLQ] : RLQ [LLQ] : LLQ

## 2024-04-26 NOTE — HISTORY OF PRESENT ILLNESS
[FreeTextEntry1] : Augusta Delgado is a 58-year-old female presents for hospital follow up. Pt seen at [facility] ED after having LLQ pain for three days. CT revealed acute uncomplicated distal transverse colonic diverticulitis. Pt given 10-day course of Augmentin, currently on day 2. Pt still reports intermittent discomfort in LLQ, remains on clear liquid diet. Pt also reporting persistent GERD despite pantoprazole therapy. Last EGD in 2022. Previous colonoscopy in 2022- Dr. Mix. Denies FMH of CRC. Denies significant straining or overt bleeding such as melena or hematochezia. Denies nausea, vomiting, or dysphagia. Denies unintentional weight loss.

## 2024-04-26 NOTE — REVIEW OF SYSTEMS
[Negative] : Heme/Lymph [As Noted in HPI] : as noted in HPI [Abdominal Pain] : abdominal pain [Vomiting] : no vomiting [Constipation] : no constipation [Diarrhea] : no diarrhea [Heartburn] : heartburn [Melena (black stool)] : no melena [Bleeding] : no bleeding [Fecal Incontinence (soiling)] : no fecal incontinence [Bloating (gassiness)] : no bloating

## 2024-04-30 RX ORDER — PANTOPRAZOLE 20 MG/1
20 TABLET, DELAYED RELEASE ORAL DAILY
Qty: 90 | Refills: 3 | Status: DISCONTINUED | COMMUNITY
Start: 2019-07-11 | End: 2024-04-30

## 2024-05-19 ENCOUNTER — RX RENEWAL (OUTPATIENT)
Age: 59
End: 2024-05-19

## 2024-05-19 RX ORDER — ANASTROZOLE TABLETS 1 MG/1
1 TABLET ORAL
Qty: 90 | Refills: 1 | Status: ACTIVE | COMMUNITY
Start: 2020-05-01 | End: 1900-01-01

## 2024-06-13 ENCOUNTER — APPOINTMENT (OUTPATIENT)
Dept: BREAST CENTER | Facility: CLINIC | Age: 59
End: 2024-06-13
Payer: COMMERCIAL

## 2024-06-13 VITALS
DIASTOLIC BLOOD PRESSURE: 76 MMHG | WEIGHT: 205 LBS | HEIGHT: 69 IN | SYSTOLIC BLOOD PRESSURE: 119 MMHG | BODY MASS INDEX: 30.36 KG/M2 | HEART RATE: 79 BPM

## 2024-06-13 DIAGNOSIS — Z08 ENCOUNTER FOR FOLLOW-UP EXAMINATION AFTER COMPLETED TREATMENT FOR MALIGNANT NEOPLASM: ICD-10-CM

## 2024-06-13 DIAGNOSIS — G89.18 OTHER ACUTE POSTPROCEDURAL PAIN: ICD-10-CM

## 2024-06-13 DIAGNOSIS — Z85.3 ENCOUNTER FOR FOLLOW-UP EXAMINATION AFTER COMPLETED TREATMENT FOR MALIGNANT NEOPLASM: ICD-10-CM

## 2024-06-13 PROCEDURE — 99214 OFFICE O/P EST MOD 30 MIN: CPT

## 2024-06-13 RX ORDER — FAMOTIDINE 40 MG/1
40 TABLET, FILM COATED ORAL
Qty: 90 | Refills: 2 | Status: DISCONTINUED | COMMUNITY
Start: 2024-04-26 | End: 2024-06-13

## 2024-06-13 RX ORDER — PREDNISONE 10 MG/1
10 TABLET ORAL
Qty: 42 | Refills: 0 | Status: DISCONTINUED | COMMUNITY
Start: 2024-04-22 | End: 2024-06-13

## 2024-06-13 NOTE — PHYSICAL EXAM
[Normocephalic] : normocephalic [Sclera nonicteric] : sclera nonicteric [Supple] : supple [No Supraclavicular Adenopathy] : no supraclavicular adenopathy [No Cervical Adenopathy] : no cervical adenopathy [Clear to Auscultation Bilat] : clear to auscultation bilaterally [Normal Sinus Rhythm] : normal sinus rhythm [Examined in the supine and seated position] : examined in the supine and seated position [No dominant masses] : no dominant masses in right breast  [No dominant masses] : no dominant masses left breast [No Axillary Lymphadenopathy] : no left axillary lymphadenopathy [No Edema] : no edema [No Rashes] : no rashes [No Ulceration] : no ulceration [de-identified] : Circumareolar scar.  Medial and lateral scars. DMITRI flap [de-identified] : Circumareolar scar. Medial and lateral scars. DMITRI flap.  [de-identified] : Transverse scar.

## 2024-06-13 NOTE — DATA REVIEWED
[FreeTextEntry1] : I have independently reviewed the reports and the images.   Breast MRI 4/1/24 - BIRADS 2

## 2024-06-13 NOTE — ASSESSMENT
[FreeTextEntry1] :  Ms. Delgado is a 58 year old woman  - 21 years s/p R mastectomy for a right breast cancer - 4 years 9 mo s/p L mastectomy for a stage II (prognostic stage I) infiltrating ductal carcinoma   Her breast exam today is without suspicious findings. I would like to see her back for a follow-up in 6 months. She understands and is comfortable with the plan. She is encouraged to call if any questions or concerns arise.

## 2024-06-13 NOTE — HISTORY OF PRESENT ILLNESS
[FreeTextEntry1] : Ms. Delgado is a 58 year old woman here for a follow-up for surveillance for breast cancer.   She had right breast cancer at age 37 treated with mastectomy/saline implant reconstruction (Dr. Townsend/Reilly) and 6 months of chemotherapy (Dr. White- one drug was "red").  The right nipple was reconstructed with a left nipple graft.   She complained of left breast itching in the lower breast since October 2018.  MRI in June 2019 showed a number of abnormalities and a biopsy of 2 adjacent areas confirmed infiltrating ductal carcinoma, 6/9, ER 90%, KY 70%, Her2 0. She has always had small pimple like lesions of her left nipple. She had a left breast saline implant that had been ruptured for at least 2 years. She underwent a left mastectomy with sentinel node biopsy (Dr. Betancourt, 9/12/19) and removal of both implants with bilateral DMITRI flap reconstructions (Dr. Rodriguez) which showed two tumors up to 2.5 cm, and 1/3 L sentinel and non-sentinel nodes. She was scheduled for a left axillary dissection with LYMPHA procedure, but developed a tender cord in her right forearm and extensive superficial phlebitis of her basilic vein requiring Xarelto, and so surgery was canceled. Oncotype was 24. She is s/p adjuvant chemotherapy (Dr. Lindo, ) and radiation (Dr. Torrez) and is currently on anastrazole.  After surgery and radiation on the left side, she had an ache in the left axilla and an itching burning discomfort in the left mastectomy site. Her symptoms improve when she does Pilates.   Currently she has no breast lumps or skin changes. She still has the same feeling in the left breast that feels like a clogged duct, and it has been the same. She has resumed Pilates which improves her symptoms.  Her genetic panel testing is negative. Her family history is significant for breast cancer in the mother at 71.

## 2024-07-05 ENCOUNTER — OUTPATIENT (OUTPATIENT)
Dept: OUTPATIENT SERVICES | Facility: HOSPITAL | Age: 59
LOS: 1 days | End: 2024-07-05
Payer: COMMERCIAL

## 2024-07-05 ENCOUNTER — APPOINTMENT (OUTPATIENT)
Dept: RADIOLOGY | Facility: CLINIC | Age: 59
End: 2024-07-05
Payer: COMMERCIAL

## 2024-07-05 DIAGNOSIS — Z90.11 ACQUIRED ABSENCE OF RIGHT BREAST AND NIPPLE: Chronic | ICD-10-CM

## 2024-07-05 DIAGNOSIS — Z98.890 OTHER SPECIFIED POSTPROCEDURAL STATES: Chronic | ICD-10-CM

## 2024-07-05 DIAGNOSIS — Z98.82 BREAST IMPLANT STATUS: Chronic | ICD-10-CM

## 2024-07-05 DIAGNOSIS — R05.8 OTHER SPECIFIED COUGH: ICD-10-CM

## 2024-07-05 DIAGNOSIS — Z45.2 ENCOUNTER FOR ADJUSTMENT AND MANAGEMENT OF VASCULAR ACCESS DEVICE: Chronic | ICD-10-CM

## 2024-07-05 PROCEDURE — 71046 X-RAY EXAM CHEST 2 VIEWS: CPT

## 2024-07-05 PROCEDURE — 71046 X-RAY EXAM CHEST 2 VIEWS: CPT | Mod: 26

## 2024-07-29 NOTE — ED ADULT NURSE NOTE - ATTEMPT TO OOB
Place of Service: [facility]    Patient: Jane Staley    Date of procedure: 7/12/2024    Surgeon: Wilder Booker MD    Primary Physician: Jazmyne Jordan MD    Assistant: None     Operative Procedure: Colonoscopy    Preoperative Diagnosis: Weight loss    Post Op Diagnosis: Normal and Hemorrhoids    Anesthesia Staff: No responsible provider has been recorded for the case.    Anesthesia Administered: as per Anesthesia    Procedure events  Sedation Start Time: As per Anesthesia   Event Tracking       Panel 1       Procedure : EGD        Event Time In    Procedure Start 1303    Procedure End 1308         Procedure : COLONOSCOPY        Event Time In    Procedure Start 1312    Procedure End 1353           Scope In: 1328   At Cecum: 1350  Scope Out: 1353  Scope Withdrawal Time: 3         Procedure Description: The patient was placed in the left lateral position and monitored continuously through ECG tracing, pulse oximetry monitoring and direct observations. Medications were administered incrementally over the course of the procedure to achieve an adequate level of conscious sedation. After anorectal examination was performed, the Olympus scope as noted in chart was inserted into the rectum and advanced under direct vision to the cecum, which was identified by IC valve and appendiceal orifice. The procedure was considered not difficult..      During withdrawal examination, the final quality of the prep was Bedford bowel prep scale.  Right colon:2- (minor amount of residual staining, small fragments of stool, and\or opaque liquid, but mucosa of colon segment is seen well)  Transverse colon: 2- (minor amount of residual staining, small fragments of stool, and\or opaque liquid, but mucosa of colon segment is seen well)  Left: 2- (minor amount of residual staining, small fragments of stool, and\or opaque liquid, but mucosa of colon segment is seen well)    A careful inspection was made as the  colonoscope was withdrawn, including a retroflexed view of the rectum, findings and interventions are described below. Appropriate photo documentation was obtained.    Overall Jane Staley tolerated the procedure well, without undue discomfort, hypotension or desaturation.    Findings:  Anorectal: Hemorrhoids  Terminal ileum: Not Examined  Cecum: Normal Mucosa  Ascending colon:Normal Mucosa  Transverse colon: Normal Mucosa  Descending colon: Normal Mucosa  Sigmoid colon: Normal Mucosa  Rectum: Normal Mucosa    Interventions:   None    Implants:  * No implants in log *    Specimens:  None    Blood loss: None    Complications: none    Impression:  Wt. loss       Recommendations  Resume previous diet and We are not recommending follow up at this time based on age.       no

## 2024-08-05 NOTE — ED ADULT NURSE NOTE - OBJECTIVE STATEMENT
55 y/o female with a PMHx of Breast CA dx 16 years ago s/p mastectomy and on chemo finished in February, Ovarian cyst, Endometriosis, presents to the ED c/o chest heaviness x 2 days, non reproducible. States she "always has chest heaviness" after mastectomy. Went to  today for body aches, SOB and covid testing. Had a normal CXR today. Sent from  for EKG changes. Denies fever, abdominal pain, urinary complaints. Hx of DVT to JENNA, was placed on blood thinners until January, 2020 No

## 2024-08-29 NOTE — DISCHARGE INSTRUCTIONS: CHEMOTHERAPY - NSAPPTSLABDETAIL_HEME_A_AMB
[facility]ist Internal Medicine   Progress Note              Subjective:    8/29:  Pain stable. No F or C  No worsening abx pain  No other c/c     8/28: had some worseing of gout pain yesterday   No increasing pain in abdomen   No SOB, minimal cough  No urinary sxs  No other c/c  No N V       8/27  Pain resolving  Had Bms yesterday  No N/V  No F or C     8/26  More hypoxic today CXR with some fluid overload / infiltrates  Gave 20mg IV Lasix x1, no resp distress, NT pro bnp elevated  Discussed with APN surgery  Stop Protonix gtt, start IV pronotix scheduled  Stop fluids  Creatinine stable 1.4 range  Mild elevated LFT  Mild acidosis based on bicarb, lactic negative last night  Hgb 8.8      8/25  Creatinine stable 1.37  CKD  LFT coming down  Hgb 9 from 9.8  Afebrile pulse normal, , on 3L   WBC 15k up from 9   Rare gpc from abd fluid, blood cultures NGTD  Family at bedside  Pt appears more comfortable today, less flushed, less pain, smiling.    8/24        Denies CP, denies SOB  Creatinine 1.46 at baseline   , , tbili normal  Lactic acidosis was mild and now resolved  WBC normal  9.8 hgb at baseline, chronic. Will likely be aspect of ac blood loss anemia.  Pt on ceftriaxone, micafungin, flagyl. He is also on pca  S/p   ROBOTIC ASSISTED DIAGNOSTIC LAPAROSCOPY, ABDOMINIAL WASHOUT, GHRAM PATCH OF PERFORATED DUODENAL ULCER, CHOLECYSTECTOMY, DRAIN PLACEMENT  2 cm ventral umbilical hernia repair, primary closure.    14 point Review of systems is negative except for as noted above.      Time of note does not necessarily reflect time of exam. Pt seen and examined by me on date of service.If  used, formal phone or iPad  used (hospital approved and provided). Voice recognition software may have been used.    I/O's    Intake/Output Summary (Last 24 hours) at 8/29/2024 1117  Last data filed at 8/29/2024 1000  Gross per 24 hour   Intake 290 ml   Output 20 ml   Net 270 ml          ALLERGIES:  Patient has no known allergies.     No data recorded  MAP (mmHg)  Av.8  Min: 94  Max: 108          HOSPITAL MEDS  Current Facility-Administered Medications   Medication    iohexol ORAL (OMNIPAQUE 350) oral contrast solution 12.5 mL    piperacillin-tazobactam (ZOSYN) 3.375 g in sodium chloride 0.9 % 100 mL IVPB    acetaminophen (TYLENOL) tablet 1,000 mg    oxyCODONE (IMM REL) (ROXICODONE) tablet 5 mg    pantoprazole (PROTONIX INJECT) injection 40 mg    bisacodyl (DULCOLAX) suppository 10 mg    nalbuphine (NUBAIN) injection 2.5 mg    naLOXone (NARCAN) injection 0.1 mg    heparin (porcine) injection 5,000 Units    micafungin (MYCAMINE) 100 mg in sodium chloride 0.9 % 100 mL IVPB    sodium chloride 0.9 % flush bag 25 mL    sodium chloride 0.9 % injection 2 mL    sodium chloride 0.9% infusion         Last Recorded Vitals      SpO2 Readings from Last 3 Encounters:   24 97%   22 100%   06/10/22 98%        VITAL SIGNS:     Vital Last Value 24 Hour Range   Temperature 97.2 °F (36.2 °C) (24 1105) Temp  Min: 97.2 °F (36.2 °C)  Max: 98.2 °F (36.8 °C)   Pulse 72 (24 1105) Pulse  Min: 61  Max: 77   Respiratory 16 (24 1500) Resp  Min: 16  Max: 18   Non-Invasive  Blood Pressure 131/76 (24 1105) BP  Min: 131/76  Max: 160/82   Pulse Oximetry 97 % (24 1105) SpO2  Min: 95 %  Max: 99 %   Arterial   Blood Pressure   No data recorded      Vital Today Admitted   Weight 63.4 kg (139 lb 12.4 oz) (24 0600) Weight: 62 kg (136 lb 11 oz) (24 181)   Height N/A Height: 5' 3\" (160 cm) (24 020)   BMI N/A BMI (Calculated): 25.42 (24 020)            Physical Exam:   Physical Exam  Vitals reviewed.   Constitutional:       Appearance: Normal appearance.   HENT:      Head: Normocephalic and atraumatic.   Eyes:      Extraocular Movements: Extraocular movements intact.      Conjunctiva/sclera: Conjunctivae normal.   Cardiovascular:      Rate and Rhythm: Normal rate.    Pulmonary:      Effort: Pulmonary effort is normal.      Breath sounds: No stridor.   Abdominal:      Tenderness: There is no abdominal tenderness. There is no guarding.   Musculoskeletal:      Right lower leg: No edema.      Left lower leg: No edema.   Skin:     General: Skin is warm and dry.   Neurological:      General: No focal deficit present.      Mental Status: He is alert and oriented to person, place, and time. Mental status is at baseline.   Psychiatric:         Mood and Affect: Mood normal.         Behavior: Behavior normal.         Labs   Recent Labs     08/27/24 0756 08/28/24  0523 08/29/24  0502   WBC 15.8* 19.4* 19.1*   RBC 3.19* 3.15* 3.03*   HGB 9.7* 9.5* 9.2*   HCT 29.1* 28.6* 27.4*    256 279   MCV 91.2 90.8 90.4   MCH 30.4 30.2 30.4   MCHC 33.3 33.2 33.6   NRBCRE 0 0 0         Recent Labs     08/27/24  0756 08/28/24  0523 08/28/24 1627 08/29/24  0502   SODIUM 140 145  --  144   POTASSIUM 3.1* 3.2* 3.5 3.3*   CO2 21 20*  --  22   ANIONGAP 11 14  --  9   GLUCOSE 120* 117*  --  119*   BUN 30* 36*  --  36*   CREATININE 1.40* 1.48*  --  1.40*   CALCIUM 9.2 9.3  --  9.2   BILIRUBIN 0.5 0.4  --  0.4   AST 27 26  --  19   GPT 49 40  --  33   ALKPT 54 56  --  51   GLOB 3.9 4.1*  --  3.9   AGR 0.5* 0.5*  --  0.5*        Recent Labs   Lab 08/29/24  0502 08/28/24  1627 08/28/24  0523 08/27/24  0756   SODIUM 144  --  145 140   POTASSIUM 3.3* 3.5 3.2* 3.1*   CHLORIDE 116*  --  114* 111*   CO2 22  --  20* 21   BUN 36*  --  36* 30*   CREATININE 1.40*  --  1.48* 1.40*   GLUCOSE 119*  --  117* 120*   ALBUMIN 2.0*  --  2.1* 2.1*   AST 19  --  26 27   BILIRUBIN 0.4  --  0.4 0.5       No results for input(s): \"PCT\" in the last 72 hours.       Recent Labs   Lab 08/26/24  0530   NTPROB 8,487*        No results for input(s): \"INR\", \"PT\", \"PTT\" in the last 72 hours.      Recent Labs   Lab 08/28/24  1212 08/28/24  1757   GLUCOSE BEDSIDE 127* 209*       Imaging    FL Upper GI Single Contrast   Final Result       No contrast leak or obstruction to the flow of contrast. See discussion   above.               Electronically Signed by: MARLENA GARCIA M.D.    Signed on: 8/28/2024 12:17 PM    Workstation ID: [id number]      XR CHEST AP OR PA   Final Result      Bibasilar atelectasis, infiltrates with trace effusion.      Preliminary results issued by [employer].               Electronically Signed by: NALDO ANDREA M.D.    Signed on: 8/26/2024 7:32 AM    Workstation ID: [id number]      CT ABDOMEN PELVIS W CONTRAST - IV contrast only   Final Result      1.  There is pneumoperitoneum likely representing perforated viscus. The   source of free air is difficult to ascertain. Free air is seen within a   small umbilical hernia.      2. There is free air in the epigastric region which could suggest a distal   esophageal perforation. However, there is no visualized pneumomediastinum.      3. The gastric fundus is distended with ingested material and fluid. A   gastric perforation or proximal duodenal perforation should be considered   but is not definitively visualized.  See series 3 image 58.      4. There is no obvious evidence of bowel obstruction. There is a large left   inguinal hernia that contains seemingly nonobstructed loops of small bowel.   A left inguinal hernia was reported on a 1/4/2022 groin ultrasound study.   There is no obvious free air within the left inguinal hernia sac.      5. Retained fecal material in the colon. The appendix appears normal.   Pneumoperitoneum and multiple foci of free air are seen along the splenic   flecture. A discrete splenic flexure perforation is not definitively   visualized.      6. Portions of the left/sigmoid colon are not well distended, limiting   evaluation. No obvious free air identified within the lower abdomen/pelvis.      7. There is a small amount of low-density free fluid mostly surrounding the   liver.  No rim-enhancing intra-abdominal abscess identified.      8.  Atherosclerotic disease of the abdominal aorta and bifurcation. Grossly   patent appearance of the visceral arteries and mesenteric veins. No obvious   evidence of mesenteric ischemia.      See above for additional findings/observations.          Findings were discussed with Dr. Toth at 6:07 p.m. 8/23/2024.         Electronically Signed by: SHEILA BRODERICK DO    Signed on: 8/23/2024 6:19 PM    Workstation ID: [id number]      CT ABDOMEN PELVIS W CONTRAST    (Results Pending)       Cultures  Microbiology Results  (Last 10 results in the past 7 days)      Specimen   Gram Smear   Culture Result   Status       08/23/24 2034         Few Polymorphonuclear cells.            Rare Gram positive cocci            Few Yeast.                  All imaging, labs, vitals and related tests have been reviewed and interpreted by me.     Active Hospital Problems    *Perforated viscus      Stage 3b chronic kidney disease  (CMD)      Anemia due to stage 3b chronic kidney disease  (CMD)      Hyperlipidemia      Hypertensive disorder      Gout        Assessment/Plan:  All problems present on admission unless otherwise specified      8/26  More hypoxic today CXR with some fluid overload / infiltrates  Gave 20mg IV Lasix x1, no resp distress, NT pro bnp elevated  Discussed with APN surgery  Stop Protonix gtt, start IV pronotix scheduled  Stop fluids  Creatinine stable 1.4 range  Mild elevated LFT  Mild acidosis based on bicarb, lactic negative last night  Hgb 8.8    Perforated viscus  Sepsis due to bowel perforation--improving  Patient has been taking high doses of celecoxib for gout.  It seems he may be taking 400 mg twice daily  Emergent surgery tonight for repair  8/24  S/p   ROBOTIC ASSISTED DIAGNOSTIC LAPAROSCOPY, ABDOMINIAL WASHOUT, GHRAM PATCH OF PERFORATED DUODENAL ULCER, CHOLECYSTECTOMY, DRAIN PLACEMENT  2 cm ventral umbilical hernia repair, primary closure.  ID following  On   Pt on ceftriaxone, micafungin, flagyl.   Strict  npo  Protonix gtt   General surgery following  8/26  Stop fluids due to fluid overload. Discussed with surgery. IV Protonix BID stop Protonix gtt. NGT in per surgery. Will likely need UGI series Wednesday. Npo strict.   8/27: clamp NGT today, needs XR to assess for leak     8/28: WBC is elevated, No pulm or urinary complaints. No increasing pain in abdomen. May be from gout. NGT in place, surgery following     8/29: wbc minimally down. On Zosyn and micafungin. ID following. Repeat CT today     Pulmonary edema, has been on IVF and meds  Stop fluids and Protonix gtt  CXR with some congestion  NT proBNP elevated  Check echo EF 63, grade 1 Diatolic dysfunction    improved     CKD 3  Creatinine 1.8 and this may be around his baseline     Anemia of chronic kidney disease  Hemoglobin 9.7, close monitoring ongoing     Gout  Needs to refrain from celecoxib use   Continue colchicine, gout when able     Prostatic hypertrophy  Continue tamsulosin when able     Hypertension  Continue ARB when able     Dyslipidemia  Statin to be continued when able    Acute on chronic anemia due to blood loss  8/26  Hgb 8.8    Discussed 8/24 with wife and pt NO nsaids and needs Protonix BID     Sepsis-present on admission improved     Acute hypoxic respiratory failure     Dispo planning: tbd    DVT Prophylaxis:   Current Active Medications for DVT Prophylaxis (From admission, onward)           Stop     heparin (porcine) injection 5,000 Units  5,000 Units,   Subcutaneous,   3 times per day         --                  Diet: Liquid Clear Diet   Baseline Activity:   CODE STATUS:   Code Status: Full Resuscitation  PCP:  Bhumika Jarvis DO        Expected Discharge Date: 8/30/2024       Communication:   with patient      MORE than 50 MINS WERE SPENT ON THIS PATIENTS CARE TODAY. This includes the following: Reviewed all vitals, medications, new orders, I/O, labs, micro, radiology, nurses notes, pertinent consultant notes which are reflected in assessment  and plan.This does not include time spent on other items of care such as smoking cessation counseling, prolonged care time, and or advanced care planning if applicable.          Michael Draper MD  [facility]  8/29/2024 11:17 AM           Feb. 27  at 3:30pm with Dr. Lindo

## 2024-09-17 NOTE — ED ADULT NURSE NOTE - NSFALLRISKFACTORS_ED_ALL_ED
Medication Therapy Management (MTM) Encounter    ASSESSMENT:                            Medication Adherence/Access: No issues identified    Diabetes/CKD:   Patient is meeting A1c goal of < 8%.  Self monitoring of blood glucose is at goal of > 50% time in target with continuous glucose monitoring..  Microalbumin is not at goal <30mg/g.   Patient would benefit from increasing AM Novolin dose for daytime hyperglycemia. May consider addition of SGLT2i in the future if creatinine is stabilized, which could also help lower A1c and potentially allow for decrease in insulin dosing.     Hypertension:   Stable. Patient is meeting blood pressure goal of < 130/80mmHg.      Hyperlipidemia:   Stable.      Inflammation arthritis  Recommend starting methotrexate/folic acid therapy as planned. Continue to follow with rheumatology.     Osteopenia:   Stable   PLAN:                            Increase morning dose of Novolin N to 46 units and keep evening dose at 20 units.    Follow-up: Return in about 3 months (around 12/17/2024) for Medication Therapy Management Pharmacist.    SUBJECTIVE/OBJECTIVE:                          Lindsey Gary is a 82 year old female seen for a follow-up visit.       Reason for visit: blood glucose review.     Tobacco: She reports that she has never smoked. She has never been exposed to tobacco smoke. She has never used smokeless tobacco.  Alcohol: not currently using    Medication Adherence/Access: no issues reported, does not qualify for assistance program     Diabetes   /CKD  Glipizide 10 mg twice daily before breakfast and lunch   NPH insulin 44 units at 8 am and 20 units at 5 pm    No side effects or concerns regarding blood glucose today.     Blood sugar monitoring: CGM Freestyle Juan [place] 2         Eye exam is up to date  Foot exam: due    Hypertension   Metoprolol ER 50 mg daily  Losartan 50 mg daily -switched from nifedipine   Furosemide 40 mg daily for edema    Using compression stockings for edema in  left leg. Has been having some dizziness still since going on losartan.   Patient does not self-monitor blood pressure.       Hyperlipidemia   Rosuvastatin 20mg daily  No side effects reported. Annual labs due.          Inflammation arthritis  Methotrexate and folic acid - has not started yet but plans to soon   Tylenol 1000 mg twice daily + 1000 mg as needed occasionally   Voltaren as needed    No side effects. She is nervous about starting methotrexate and getting nausea from it. Granddaughter took it for an autoimmune disease and had bad side effects.   Feels that Tylenol and Voltaren is adequate for pain control right now.   Following with Rheumatology.       Osteopenia:   Calcium/vitamin D 1000 mg once daily (she thinks 1000 mg)  Gets cottage cheese or yogurt every day and some leafy greens. No concerns.     Today's Vitals: /64   LMP  (LMP Unknown)  - declines weight today   ----------------      I spent 30 minutes with this patient today. All changes were made via collaborative practice agreement with LILLIAM Sam CNP. A copy of the visit note was provided to the patient's provider(s).    A summary of these recommendations was given to the patient.    Colette Nguyen, PharmD, BCACP  Medication Therapy Management Provider, [facility]  [phone]         Medication Therapy Recommendations  Type 2 diabetes mellitus with complication, without long-term current use of insulin (H)    Current Medication: insulin  UNIT/ML vial   Rationale: Dose too low - Dosage too low - Effectiveness   Recommendation: Increase Dose   Status: Accepted per CPA             No indicators present

## 2024-09-18 ENCOUNTER — OUTPATIENT (OUTPATIENT)
Dept: OUTPATIENT SERVICES | Facility: HOSPITAL | Age: 59
LOS: 1 days | Discharge: HOME | End: 2024-09-18

## 2024-09-18 DIAGNOSIS — Z98.82 BREAST IMPLANT STATUS: Chronic | ICD-10-CM

## 2024-09-18 DIAGNOSIS — C50.512 MALIGNANT NEOPLASM OF LOWER-OUTER QUADRANT OF LEFT FEMALE BREAST: ICD-10-CM

## 2024-09-18 DIAGNOSIS — Z98.890 OTHER SPECIFIED POSTPROCEDURAL STATES: Chronic | ICD-10-CM

## 2024-09-18 DIAGNOSIS — Z45.2 ENCOUNTER FOR ADJUSTMENT AND MANAGEMENT OF VASCULAR ACCESS DEVICE: Chronic | ICD-10-CM

## 2024-09-18 DIAGNOSIS — Z90.11 ACQUIRED ABSENCE OF RIGHT BREAST AND NIPPLE: Chronic | ICD-10-CM

## 2024-10-08 ENCOUNTER — RESULT REVIEW (OUTPATIENT)
Age: 59
End: 2024-10-08

## 2024-10-08 ENCOUNTER — APPOINTMENT (OUTPATIENT)
Dept: GASTROENTEROLOGY | Facility: AMBULATORY MEDICAL SERVICES | Age: 59
End: 2024-10-08
Payer: COMMERCIAL

## 2024-10-08 PROCEDURE — 43239 EGD BIOPSY SINGLE/MULTIPLE: CPT

## 2024-11-01 ENCOUNTER — NON-APPOINTMENT (OUTPATIENT)
Age: 59
End: 2024-11-01

## 2024-11-01 ENCOUNTER — RESULT REVIEW (OUTPATIENT)
Age: 59
End: 2024-11-01

## 2024-11-01 ENCOUNTER — APPOINTMENT (OUTPATIENT)
Dept: HEMATOLOGY ONCOLOGY | Facility: CLINIC | Age: 59
End: 2024-11-01
Payer: COMMERCIAL

## 2024-11-01 VITALS
HEART RATE: 87 BPM | DIASTOLIC BLOOD PRESSURE: 77 MMHG | OXYGEN SATURATION: 99 % | SYSTOLIC BLOOD PRESSURE: 115 MMHG | TEMPERATURE: 97.2 F | RESPIRATION RATE: 18 BRPM | WEIGHT: 197.73 LBS | BODY MASS INDEX: 29.2 KG/M2

## 2024-11-01 DIAGNOSIS — C50.512 MALIGNANT NEOPLASM OF LOWER-OUTER QUADRANT OF LEFT FEMALE BREAST: ICD-10-CM

## 2024-11-01 DIAGNOSIS — Z17.0 MALIGNANT NEOPLASM OF LOWER-OUTER QUADRANT OF LEFT FEMALE BREAST: ICD-10-CM

## 2024-11-01 LAB
BASOPHILS # BLD AUTO: 0.09 K/UL — SIGNIFICANT CHANGE UP (ref 0–0.2)
BASOPHILS NFR BLD AUTO: 1 % — SIGNIFICANT CHANGE UP (ref 0–2)
EOSINOPHIL # BLD AUTO: 0.16 K/UL — SIGNIFICANT CHANGE UP (ref 0–0.5)
EOSINOPHIL NFR BLD AUTO: 1.8 % — SIGNIFICANT CHANGE UP (ref 0–6)
HCT VFR BLD CALC: 40 % — SIGNIFICANT CHANGE UP (ref 34.5–45)
HGB BLD-MCNC: 13.1 G/DL — SIGNIFICANT CHANGE UP (ref 11.5–15.5)
IMM GRANULOCYTES NFR BLD AUTO: 0.3 % — SIGNIFICANT CHANGE UP (ref 0–0.9)
LYMPHOCYTES # BLD AUTO: 2.66 K/UL — SIGNIFICANT CHANGE UP (ref 1–3.3)
LYMPHOCYTES # BLD AUTO: 29.2 % — SIGNIFICANT CHANGE UP (ref 13–44)
MCHC RBC-ENTMCNC: 29.3 PG — SIGNIFICANT CHANGE UP (ref 27–34)
MCHC RBC-ENTMCNC: 32.8 G/DL — SIGNIFICANT CHANGE UP (ref 32–36)
MCV RBC AUTO: 89.5 FL — SIGNIFICANT CHANGE UP (ref 80–100)
MONOCYTES # BLD AUTO: 0.78 K/UL — SIGNIFICANT CHANGE UP (ref 0–0.9)
MONOCYTES NFR BLD AUTO: 8.6 % — SIGNIFICANT CHANGE UP (ref 2–14)
NEUTROPHILS # BLD AUTO: 5.38 K/UL — SIGNIFICANT CHANGE UP (ref 1.8–7.4)
NEUTROPHILS NFR BLD AUTO: 59.1 % — SIGNIFICANT CHANGE UP (ref 43–77)
NRBC # BLD: 0 /100 WBCS — SIGNIFICANT CHANGE UP (ref 0–0)
NRBC BLD-RTO: 0 /100 WBCS — SIGNIFICANT CHANGE UP (ref 0–0)
PLATELET # BLD AUTO: 360 K/UL — SIGNIFICANT CHANGE UP (ref 150–400)
RBC # BLD: 4.47 M/UL — SIGNIFICANT CHANGE UP (ref 3.8–5.2)
RBC # FLD: 13.6 % — SIGNIFICANT CHANGE UP (ref 10.3–14.5)
WBC # BLD: 9.1 K/UL — SIGNIFICANT CHANGE UP (ref 3.8–10.5)
WBC # FLD AUTO: 9.1 K/UL — SIGNIFICANT CHANGE UP (ref 3.8–10.5)

## 2024-11-01 PROCEDURE — 99214 OFFICE O/P EST MOD 30 MIN: CPT

## 2024-11-03 LAB
ALBUMIN SERPL ELPH-MCNC: 4.4 G/DL
ALP BLD-CCNC: 59 U/L
ALT SERPL-CCNC: 12 U/L
ANION GAP SERPL CALC-SCNC: 12 MMOL/L
AST SERPL-CCNC: 10 U/L
BILIRUB SERPL-MCNC: 0.3 MG/DL
BUN SERPL-MCNC: 15 MG/DL
CALCIUM SERPL-MCNC: 9.6 MG/DL
CHLORIDE SERPL-SCNC: 103 MMOL/L
CO2 SERPL-SCNC: 27 MMOL/L
CREAT SERPL-MCNC: 0.88 MG/DL
EGFR: 76 ML/MIN/1.73M2
FERRITIN SERPL-MCNC: 148 NG/ML
GLUCOSE SERPL-MCNC: 89 MG/DL
POTASSIUM SERPL-SCNC: 4.2 MMOL/L
PROT SERPL-MCNC: 7.3 G/DL
SODIUM SERPL-SCNC: 142 MMOL/L

## 2024-11-11 NOTE — ED STATDOCS - GASTROINTESTINAL [+], MLM
[FreeTextEntry1] : REASON FOR REQUEST: This young lady comes today for the chief complaint of right wrist injury.   HISTORY OF PRESENT ILLNESS: Isabelle is an 8-year-old little girl who sustained an injury with a fall on an outstretched hand on her right upper extremity.  The patient was evaluated by Dr. Hollis who had been suspicious of a possible Salter-Neves I fracture and she was placed into wrist splint immobilization after x-rays failed to reveal any evidence of displacement of the epiphysis or obvious fracture.  Date of the injury was approximately three to four weeks ago.  She has been doing much better although she has had intermittent complaints of pain localized to the distal radius.  She has been compliant with activity restrictions and comes today for further followup.   PAST MEDICAL HISTORY:  None.   PAST SURGICAL HISTORY: None.   ALLERGIES: The patient has no known drug allergies.   MEDICATIONS: The patient does not take any medications.   REVIEW OF SYSTEMS: Today is negative for fever, chills, chest pain, shortness of breath, or rashes.   FAMILY/SOCIAL HISTORY:  The child is in 3rd grade.  She has siblings. There are no orthopedic or neurologic conditions that run in the family. The child resides within a tobacco-free household.   PHYSICAL EXAMINATION: On physical examination today, Isabelle is in no apparent distress.  She is pleasant and cooperative.  Focused examination of the right wrist demonstrates normal clinical alignment.  No obvious swelling or ecchymosis.  The patient has virtually no tenderness to palpation over the level of the distal radial physis, although with attempted motion there is some recreation of discomfort with some stiffness as compared with the contralateral side.  5/5 EPL, EDC, first dorsal interosseus, and FDP to the index finger with capillary refill less than 2 seconds and no clinical deformity appreciated.   REVIEW OF IMAGING: X-ray imaging was available for review from prior imaging as well as repeat AP, lateral and oblique views of the right wrist indicating no evidence of periosteal reaction, obvious healing or evidence of widening of the growth plate.   ASSESSMENT/PLAN: Isabelle is an 8-year-old little girl who appears to have had a Salter-Neves I fracture of the wrist, which appears clinically healed.  There is no evidence of radiographic displacement or healing.  Today, I reviewed with the patient's mother, who act as independent historian given the child's pediatric age, the fact that at this point it is essential to remove the wrist splint, begin range of motion exercises with an anticipated return to full sport by approximately one to two weeks.  I reviewed the fact that stiffness is typically appreciated as pain.  There is no contraindication to initiating range of motion at this time and I feel that the brace is just holding her back.  All questions were answered to satisfaction today.  If the patient should have further issues regarding this injury, I would be more than happy to see her back for further clinical re-assessment. 
[FreeTextEntry1] : REASON FOR REQUEST: This young lady comes today for the chief complaint of right wrist injury.   HISTORY OF PRESENT ILLNESS: Isabelle is an 8-year-old little girl who sustained an injury with a fall on an outstretched hand on her right upper extremity.  The patient was evaluated by Dr. Hollis who had been suspicious of a possible Salter-Neves I fracture and she was placed into wrist splint immobilization after x-rays failed to reveal any evidence of displacement of the epiphysis or obvious fracture.  Date of the injury was approximately three to four weeks ago.  She has been doing much better although she has had intermittent complaints of pain localized to the distal radius.  She has been compliant with activity restrictions and comes today for further followup.   PAST MEDICAL HISTORY:  None.   PAST SURGICAL HISTORY: None.   ALLERGIES: The patient has no known drug allergies.   MEDICATIONS: The patient does not take any medications.   REVIEW OF SYSTEMS: Today is negative for fever, chills, chest pain, shortness of breath, or rashes.   FAMILY/SOCIAL HISTORY:  The child is in 3rd grade.  She has siblings. There are no orthopedic or neurologic conditions that run in the family. The child resides within a tobacco-free household.   PHYSICAL EXAMINATION: On physical examination today, Isabelle is in no apparent distress.  She is pleasant and cooperative.  Focused examination of the right wrist demonstrates normal clinical alignment.  No obvious swelling or ecchymosis.  The patient has virtually no tenderness to palpation over the level of the distal radial physis, although with attempted motion there is some recreation of discomfort with some stiffness as compared with the contralateral side.  5/5 EPL, EDC, first dorsal interosseus, and FDP to the index finger with capillary refill less than 2 seconds and no clinical deformity appreciated.   REVIEW OF IMAGING: X-ray imaging was available for review from prior imaging as well as repeat AP, lateral and oblique views of the right wrist indicating no evidence of periosteal reaction, obvious healing or evidence of widening of the growth plate.   ASSESSMENT/PLAN: Isabelle is an 8-year-old little girl who appears to have had a Salter-Neves I fracture of the wrist, which appears clinically healed.  There is no evidence of radiographic displacement or healing.  Today, I reviewed with the patient's mother, who act as independent historian given the child's pediatric age, the fact that at this point it is essential to remove the wrist splint, begin range of motion exercises with an anticipated return to full sport by approximately one to two weeks.  I reviewed the fact that stiffness is typically appreciated as pain.  There is no contraindication to initiating range of motion at this time and I feel that the brace is just holding her back.  All questions were answered to satisfaction today.  If the patient should have further issues regarding this injury, I would be more than happy to see her back for further clinical re-assessment. 
ABDOMINAL PAIN/NAUSEA

## 2024-12-18 ENCOUNTER — EMERGENCY (EMERGENCY)
Facility: HOSPITAL | Age: 59
LOS: 0 days | Discharge: HOME | End: 2024-12-18
Attending: STUDENT IN AN ORGANIZED HEALTH CARE EDUCATION/TRAINING PROGRAM
Payer: COMMERCIAL

## 2024-12-18 VITALS — SYSTOLIC BLOOD PRESSURE: 116 MMHG | DIASTOLIC BLOOD PRESSURE: 67 MMHG

## 2024-12-18 VITALS — HEIGHT: 69 IN

## 2024-12-18 DIAGNOSIS — Z98.890 OTHER SPECIFIED POSTPROCEDURAL STATES: Chronic | ICD-10-CM

## 2024-12-18 DIAGNOSIS — Z90.11 ACQUIRED ABSENCE OF RIGHT BREAST AND NIPPLE: Chronic | ICD-10-CM

## 2024-12-18 DIAGNOSIS — Z98.82 BREAST IMPLANT STATUS: Chronic | ICD-10-CM

## 2024-12-18 DIAGNOSIS — Z45.2 ENCOUNTER FOR ADJUSTMENT AND MANAGEMENT OF VASCULAR ACCESS DEVICE: Chronic | ICD-10-CM

## 2024-12-18 PROCEDURE — 70450 CT HEAD/BRAIN W/O DYE: CPT | Mod: 26,MC

## 2024-12-18 PROCEDURE — 70450 CT HEAD/BRAIN W/O DYE: CPT | Mod: MC

## 2024-12-18 PROCEDURE — 99284 EMERGENCY DEPT VISIT MOD MDM: CPT

## 2024-12-18 PROCEDURE — 99284 EMERGENCY DEPT VISIT MOD MDM: CPT | Mod: 25

## 2024-12-18 RX ORDER — ACETAMINOPHEN 500MG 500 MG/1
1000 TABLET, COATED ORAL ONCE
Refills: 0 | Status: COMPLETED | OUTPATIENT
Start: 2024-12-18 | End: 2024-12-18

## 2024-12-18 RX ADMIN — ACETAMINOPHEN 500MG 1000 MILLIGRAM(S): 500 TABLET, COATED ORAL at 11:54
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
## 2024-12-18 NOTE — ED STATDOCS - PHYSICAL EXAMINATION
Constitutional: NAD, alert, verbal  HEENT: NCAT, EOMi, PERRL  Cardiac: RRR no MRG  Resp: clear, no wheezing or crackles  GI: ab soft ntnd, no r/g  MSK/Ext: no edema  Neuro: KELSEY  Skin: No rashes Constitutional: NAD, alert, verbal  HEENT: NCAT, EOMi, PERRL  Cardiac: RRR no MRG  Resp: clear, no wheezing or crackles  GI: ab soft ntnd, no r/g  MSK/Ext: no deformities  Neuro: KELSEY  Skin: No rashes
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
## 2024-12-18 NOTE — ED STATDOCS - PATIENT PORTAL LINK FT
You can access the FollowMyHealth Patient Portal offered by [facility] by registering at the following website: [email or web address]. By joining [facility]’s FollowMyHealth portal, you will also be able to view your health information using other applications (apps) compatible with our system.
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
## 2024-12-19 DIAGNOSIS — R51.9 HEADACHE, UNSPECIFIED: ICD-10-CM

## 2024-12-19 DIAGNOSIS — Z85.3 PERSONAL HISTORY OF MALIGNANT NEOPLASM OF BREAST: ICD-10-CM

## 2024-12-19 DIAGNOSIS — Z88.1 ALLERGY STATUS TO OTHER ANTIBIOTIC AGENTS: ICD-10-CM

## 2024-12-19 DIAGNOSIS — N80.9 ENDOMETRIOSIS, UNSPECIFIED: ICD-10-CM
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
## 2024-12-27 ENCOUNTER — APPOINTMENT (OUTPATIENT)
Dept: GASTROENTEROLOGY | Facility: CLINIC | Age: 59
End: 2024-12-27

## 2024-12-30 ENCOUNTER — APPOINTMENT (OUTPATIENT)
Dept: OBGYN | Facility: CLINIC | Age: 59
End: 2024-12-30
Payer: COMMERCIAL

## 2024-12-30 VITALS
SYSTOLIC BLOOD PRESSURE: 116 MMHG | BODY MASS INDEX: 29.33 KG/M2 | WEIGHT: 198 LBS | HEIGHT: 69 IN | DIASTOLIC BLOOD PRESSURE: 68 MMHG

## 2024-12-30 DIAGNOSIS — N95.2 POSTMENOPAUSAL ATROPHIC VAGINITIS: ICD-10-CM

## 2024-12-30 DIAGNOSIS — Z85.3 PERSONAL HISTORY OF MALIGNANT NEOPLASM OF BREAST: ICD-10-CM

## 2024-12-30 DIAGNOSIS — Z01.419 ENCOUNTER FOR GYNECOLOGICAL EXAMINATION (GENERAL) (ROUTINE) W/OUT ABNORMAL FINDINGS: ICD-10-CM

## 2024-12-30 DIAGNOSIS — Z90.13 ACQUIRED ABSENCE OF BILATERAL BREASTS AND NIPPLES: ICD-10-CM

## 2024-12-30 LAB
CARD LOT #: NORMAL
CARD LOT EXP DATE: NORMAL
DATE COLLECTED: NORMAL
DATE COLLECTED: NORMAL
DEVELOPER LOT #: NORMAL
DEVELOPER LOT EXP DATE: NORMAL
HEMOCCULT 2: NEGATIVE
HEMOCCULT SP1 STL QL: NEGATIVE
QUALITY CONTROL: YES
QUALITY CONTROL: YES

## 2024-12-30 PROCEDURE — 82270 OCCULT BLOOD FECES: CPT

## 2024-12-30 PROCEDURE — 99396 PREV VISIT EST AGE 40-64: CPT

## 2025-01-02 LAB
CYTOLOGY CVX/VAG DOC THIN PREP: ABNORMAL
HPV HIGH+LOW RISK DNA PNL CVX: NOT DETECTED

## 2025-01-16 ENCOUNTER — APPOINTMENT (OUTPATIENT)
Dept: BREAST CENTER | Facility: CLINIC | Age: 60
End: 2025-01-16
Payer: COMMERCIAL

## 2025-01-16 VITALS
BODY MASS INDEX: 29.33 KG/M2 | WEIGHT: 198 LBS | SYSTOLIC BLOOD PRESSURE: 110 MMHG | HEIGHT: 69 IN | DIASTOLIC BLOOD PRESSURE: 75 MMHG | HEART RATE: 84 BPM

## 2025-01-16 DIAGNOSIS — Z08 ENCOUNTER FOR FOLLOW-UP EXAMINATION AFTER COMPLETED TREATMENT FOR MALIGNANT NEOPLASM: ICD-10-CM

## 2025-01-16 DIAGNOSIS — Z12.39 ENCOUNTER FOR OTHER SCREENING FOR MALIGNANT NEOPLASM OF BREAST: ICD-10-CM

## 2025-01-16 DIAGNOSIS — Z85.3 ENCOUNTER FOR FOLLOW-UP EXAMINATION AFTER COMPLETED TREATMENT FOR MALIGNANT NEOPLASM: ICD-10-CM

## 2025-01-16 PROCEDURE — 99214 OFFICE O/P EST MOD 30 MIN: CPT

## 2025-03-10 NOTE — H&P PST ADULT - NEGATIVE MUSCULOSKELETAL SYMPTOMS
no leg pain L/no stiffness/no arm pain L/no arm pain R/no back pain/no neck pain/no leg pain R/no arthritis
11-Mar-2025 11:53

## 2025-03-17 ENCOUNTER — OUTPATIENT (OUTPATIENT)
Dept: OUTPATIENT SERVICES | Facility: HOSPITAL | Age: 60
LOS: 1 days | Discharge: HOME | End: 2025-03-17

## 2025-03-17 DIAGNOSIS — Z90.11 ACQUIRED ABSENCE OF RIGHT BREAST AND NIPPLE: Chronic | ICD-10-CM

## 2025-03-17 DIAGNOSIS — Z45.2 ENCOUNTER FOR ADJUSTMENT AND MANAGEMENT OF VASCULAR ACCESS DEVICE: Chronic | ICD-10-CM

## 2025-03-17 DIAGNOSIS — Z98.890 OTHER SPECIFIED POSTPROCEDURAL STATES: Chronic | ICD-10-CM

## 2025-03-17 DIAGNOSIS — C50.512 MALIGNANT NEOPLASM OF LOWER-OUTER QUADRANT OF LEFT FEMALE BREAST: ICD-10-CM

## 2025-03-17 DIAGNOSIS — Z98.82 BREAST IMPLANT STATUS: Chronic | ICD-10-CM

## 2025-03-18 ENCOUNTER — APPOINTMENT (OUTPATIENT)
Dept: HEMATOLOGY ONCOLOGY | Facility: CLINIC | Age: 60
End: 2025-03-18

## 2025-05-09 ENCOUNTER — APPOINTMENT (OUTPATIENT)
Dept: HEMATOLOGY ONCOLOGY | Facility: CLINIC | Age: 60
End: 2025-05-09
Payer: COMMERCIAL

## 2025-05-09 ENCOUNTER — RESULT REVIEW (OUTPATIENT)
Age: 60
End: 2025-05-09

## 2025-05-09 VITALS
SYSTOLIC BLOOD PRESSURE: 111 MMHG | HEART RATE: 81 BPM | RESPIRATION RATE: 16 BRPM | HEIGHT: 69.09 IN | TEMPERATURE: 97.2 F | WEIGHT: 189.58 LBS | BODY MASS INDEX: 28.08 KG/M2 | DIASTOLIC BLOOD PRESSURE: 73 MMHG | OXYGEN SATURATION: 98 %

## 2025-05-09 DIAGNOSIS — C50.512 MALIGNANT NEOPLASM OF LOWER-OUTER QUADRANT OF LEFT FEMALE BREAST: ICD-10-CM

## 2025-05-09 DIAGNOSIS — Z17.0 MALIGNANT NEOPLASM OF LOWER-OUTER QUADRANT OF LEFT FEMALE BREAST: ICD-10-CM

## 2025-05-09 LAB
BASOPHILS # BLD AUTO: 0.11 K/UL — SIGNIFICANT CHANGE UP (ref 0–0.2)
BASOPHILS NFR BLD AUTO: 1.1 % — SIGNIFICANT CHANGE UP (ref 0–2)
EOSINOPHIL # BLD AUTO: 0.16 K/UL — SIGNIFICANT CHANGE UP (ref 0–0.5)
EOSINOPHIL NFR BLD AUTO: 1.6 % — SIGNIFICANT CHANGE UP (ref 0–6)
HCT VFR BLD CALC: 39.8 % — SIGNIFICANT CHANGE UP (ref 34.5–45)
HGB BLD-MCNC: 13.1 G/DL — SIGNIFICANT CHANGE UP (ref 11.5–15.5)
IMM GRANULOCYTES NFR BLD AUTO: 0.5 % — SIGNIFICANT CHANGE UP (ref 0–0.9)
LYMPHOCYTES # BLD AUTO: 2.72 K/UL — SIGNIFICANT CHANGE UP (ref 1–3.3)
LYMPHOCYTES # BLD AUTO: 27.7 % — SIGNIFICANT CHANGE UP (ref 13–44)
MCHC RBC-ENTMCNC: 29.2 PG — SIGNIFICANT CHANGE UP (ref 27–34)
MCHC RBC-ENTMCNC: 32.9 G/DL — SIGNIFICANT CHANGE UP (ref 32–36)
MCV RBC AUTO: 88.8 FL — SIGNIFICANT CHANGE UP (ref 80–100)
MONOCYTES # BLD AUTO: 0.77 K/UL — SIGNIFICANT CHANGE UP (ref 0–0.9)
MONOCYTES NFR BLD AUTO: 7.8 % — SIGNIFICANT CHANGE UP (ref 2–14)
NEUTROPHILS # BLD AUTO: 6.01 K/UL — SIGNIFICANT CHANGE UP (ref 1.8–7.4)
NEUTROPHILS NFR BLD AUTO: 61.3 % — SIGNIFICANT CHANGE UP (ref 43–77)
NRBC BLD AUTO-RTO: 0 /100 WBCS — SIGNIFICANT CHANGE UP (ref 0–0)
PLATELET # BLD AUTO: 327 K/UL — SIGNIFICANT CHANGE UP (ref 150–400)
RBC # BLD: 4.48 M/UL — SIGNIFICANT CHANGE UP (ref 3.8–5.2)
RBC # FLD: 13.3 % — SIGNIFICANT CHANGE UP (ref 10.3–14.5)
WBC # BLD: 9.82 K/UL — SIGNIFICANT CHANGE UP (ref 3.8–10.5)
WBC # FLD AUTO: 9.82 K/UL — SIGNIFICANT CHANGE UP (ref 3.8–10.5)

## 2025-05-09 PROCEDURE — 99214 OFFICE O/P EST MOD 30 MIN: CPT

## 2025-05-10 LAB
25(OH)D3 SERPL-MCNC: 46.8 NG/ML
ALBUMIN SERPL ELPH-MCNC: 4.5 G/DL
ALP BLD-CCNC: 54 U/L
ALT SERPL-CCNC: 15 U/L
ANION GAP SERPL CALC-SCNC: 18 MMOL/L
AST SERPL-CCNC: 19 U/L
BILIRUB SERPL-MCNC: 0.3 MG/DL
BUN SERPL-MCNC: 16 MG/DL
CALCIUM SERPL-MCNC: 9.5 MG/DL
CHLORIDE SERPL-SCNC: 105 MMOL/L
CO2 SERPL-SCNC: 23 MMOL/L
CREAT SERPL-MCNC: 0.95 MG/DL
EGFRCR SERPLBLD CKD-EPI 2021: 69 ML/MIN/1.73M2
GLUCOSE SERPL-MCNC: 74 MG/DL
POTASSIUM SERPL-SCNC: 4.5 MMOL/L
PROT SERPL-MCNC: 7 G/DL
SODIUM SERPL-SCNC: 146 MMOL/L

## 2025-06-02 ENCOUNTER — APPOINTMENT (OUTPATIENT)
Dept: DERMATOLOGY | Facility: CLINIC | Age: 60
End: 2025-06-02
Payer: COMMERCIAL

## 2025-06-02 ENCOUNTER — NON-APPOINTMENT (OUTPATIENT)
Age: 60
End: 2025-06-02

## 2025-06-02 DIAGNOSIS — D48.5 NEOPLASM OF UNCERTAIN BEHAVIOR OF SKIN: ICD-10-CM

## 2025-06-02 DIAGNOSIS — L89.90 PRESSURE ULCER OF UNSPECIFIED SITE, UNSPECIFIED STAGE: ICD-10-CM

## 2025-06-02 DIAGNOSIS — Z09 ENCOUNTER FOR FOLLOW-UP EXAMINATION AFTER COMPLETED TREATMENT FOR CONDITIONS OTHER THAN MALIGNANT NEOPLASM: ICD-10-CM

## 2025-06-02 DIAGNOSIS — D22.9 MELANOCYTIC NEVI, UNSPECIFIED: ICD-10-CM
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
## 2025-06-02 DIAGNOSIS — L82.1 OTHER SEBORRHEIC KERATOSIS: ICD-10-CM

## 2025-06-02 DIAGNOSIS — L81.4 OTHER MELANIN HYPERPIGMENTATION: ICD-10-CM

## 2025-06-02 DIAGNOSIS — Z00.00 ENCOUNTER FOR GENERAL ADULT MEDICAL EXAMINATION W/OUT ABNORMAL FINDINGS: ICD-10-CM

## 2025-06-02 PROCEDURE — 99203 OFFICE O/P NEW LOW 30 MIN: CPT | Mod: 25

## 2025-06-02 PROCEDURE — 11102 TANGNTL BX SKIN SINGLE LES: CPT

## 2025-06-02 PROCEDURE — 11103 TANGNTL BX SKIN EA SEP/ADDL: CPT

## 2025-06-02 RX ORDER — ROSUVASTATIN CALCIUM 5 MG/1
5 TABLET, FILM COATED ORAL
Refills: 0 | Status: ACTIVE | COMMUNITY

## 2025-06-02 RX ORDER — TIRZEPATIDE 15 MG/.5ML
INJECTION, SOLUTION SUBCUTANEOUS
Refills: 0 | Status: ACTIVE | COMMUNITY

## 2025-06-05 LAB — CORE LAB BIOPSY: NORMAL

## 2025-06-25 ENCOUNTER — APPOINTMENT (OUTPATIENT)
Dept: NEUROLOGY | Facility: CLINIC | Age: 60
End: 2025-06-25

## 2025-09-02 ENCOUNTER — RX RENEWAL (OUTPATIENT)
Age: 60
End: 2025-09-02

## 2025-09-04 ENCOUNTER — APPOINTMENT (OUTPATIENT)
Dept: BREAST CENTER | Facility: CLINIC | Age: 60
End: 2025-09-04
Payer: COMMERCIAL

## 2025-09-04 VITALS
DIASTOLIC BLOOD PRESSURE: 75 MMHG | WEIGHT: 190 LBS | HEART RATE: 100 BPM | HEIGHT: 69 IN | BODY MASS INDEX: 28.14 KG/M2 | SYSTOLIC BLOOD PRESSURE: 112 MMHG

## 2025-09-04 DIAGNOSIS — Z85.3 ENCOUNTER FOR FOLLOW-UP EXAMINATION AFTER COMPLETED TREATMENT FOR MALIGNANT NEOPLASM: ICD-10-CM

## 2025-09-04 DIAGNOSIS — Z08 ENCOUNTER FOR FOLLOW-UP EXAMINATION AFTER COMPLETED TREATMENT FOR MALIGNANT NEOPLASM: ICD-10-CM

## 2025-09-04 DIAGNOSIS — N63.20 UNSPECIFIED LUMP IN THE LEFT BREAST, UNSPECIFIED QUADRANT: ICD-10-CM

## 2025-09-04 PROCEDURE — 99214 OFFICE O/P EST MOD 30 MIN: CPT

## 2025-09-09 ENCOUNTER — APPOINTMENT (OUTPATIENT)
Dept: ULTRASOUND IMAGING | Facility: CLINIC | Age: 60
End: 2025-09-09
Payer: COMMERCIAL

## 2025-09-09 ENCOUNTER — RESULT REVIEW (OUTPATIENT)
Age: 60
End: 2025-09-09

## 2025-09-09 PROCEDURE — 76642 ULTRASOUND BREAST LIMITED: CPT | Mod: 26,LT

## 2025-09-16 ENCOUNTER — APPOINTMENT (OUTPATIENT)
Dept: DERMATOLOGY | Facility: CLINIC | Age: 60
End: 2025-09-16